# Patient Record
Sex: FEMALE | Race: WHITE | NOT HISPANIC OR LATINO | Employment: OTHER | URBAN - METROPOLITAN AREA
[De-identification: names, ages, dates, MRNs, and addresses within clinical notes are randomized per-mention and may not be internally consistent; named-entity substitution may affect disease eponyms.]

---

## 2017-03-29 ENCOUNTER — HISTORICAL (OUTPATIENT)
Dept: RADIOLOGY | Facility: HOSPITAL | Age: 72
End: 2017-03-29

## 2017-05-06 ENCOUNTER — HISTORICAL (OUTPATIENT)
Dept: LAB | Facility: HOSPITAL | Age: 72
End: 2017-05-06

## 2017-05-06 LAB
ALBUMIN SERPL-MCNC: 3.7 GM/DL (ref 3.4–5)
ALBUMIN/GLOB SERPL: 1.3 RATIO (ref 1.1–2)
ALP SERPL-CCNC: 105 UNIT/L (ref 46–116)
ALT SERPL-CCNC: 20 UNIT/L (ref 12–78)
AST SERPL-CCNC: 15 UNIT/L (ref 15–37)
BILIRUB SERPL-MCNC: 0.3 MG/DL (ref 0.2–1)
BILIRUBIN DIRECT+TOT PNL SERPL-MCNC: 0.09 MG/DL (ref 0–0.2)
BILIRUBIN DIRECT+TOT PNL SERPL-MCNC: 0.21 MG/DL (ref 0–0.8)
BUN SERPL-MCNC: 18 MG/DL (ref 7–18)
CALCIUM SERPL-MCNC: 9.3 MG/DL (ref 8.5–10.1)
CHLORIDE SERPL-SCNC: 106 MMOL/L (ref 98–107)
CO2 SERPL-SCNC: 28.5 MMOL/L (ref 21–32)
CREAT SERPL-MCNC: 0.94 MG/DL (ref 0.6–1.3)
GLOBULIN SER-MCNC: 2.9 GM/DL (ref 2.4–3.5)
GLUCOSE SERPL-MCNC: 135 MG/DL (ref 74–106)
POTASSIUM SERPL-SCNC: 4.2 MMOL/L (ref 3.5–5.1)
PROT SERPL-MCNC: 6.6 GM/DL (ref 6.4–8.2)
SODIUM SERPL-SCNC: 145 MMOL/L (ref 136–145)

## 2017-10-06 ENCOUNTER — HISTORICAL (OUTPATIENT)
Dept: RADIOLOGY | Facility: HOSPITAL | Age: 72
End: 2017-10-06

## 2017-10-06 LAB
ABS NEUT (OLG): 3.3 X10(3)/MCL (ref 2.1–9.2)
ALBUMIN SERPL-MCNC: 3.7 GM/DL (ref 3.4–5)
ALBUMIN/GLOB SERPL: 1.3 RATIO (ref 1.1–2)
ALP SERPL-CCNC: 90 UNIT/L (ref 46–116)
ALT SERPL-CCNC: 20 UNIT/L (ref 12–78)
AST SERPL-CCNC: 10 UNIT/L (ref 15–37)
BASOPHILS NFR BLD AUTO: 1 % (ref 0–2)
BILIRUB SERPL-MCNC: 0.4 MG/DL (ref 0.2–1)
BILIRUBIN DIRECT+TOT PNL SERPL-MCNC: 0.12 MG/DL (ref 0–0.2)
BILIRUBIN DIRECT+TOT PNL SERPL-MCNC: 0.28 MG/DL (ref 0–0.8)
BUN SERPL-MCNC: 14.6 MG/DL (ref 7–18)
CALCIUM SERPL-MCNC: 9.3 MG/DL (ref 8.5–10.1)
CHLORIDE SERPL-SCNC: 108 MMOL/L (ref 98–107)
CHOLEST SERPL-MCNC: 157 MG/DL (ref 0–200)
CHOLEST/HDLC SERPL: 2.5 {RATIO} (ref 0–4)
CO2 SERPL-SCNC: 35.1 MMOL/L (ref 21–32)
CREAT SERPL-MCNC: 0.72 MG/DL (ref 0.6–1.3)
EOSINOPHIL # BLD AUTO: 0.1 X10(3)/MCL
EOSINOPHIL NFR BLD AUTO: 2 %
ERYTHROCYTE [DISTWIDTH] IN BLOOD BY AUTOMATED COUNT: 13.6 % (ref 11.5–17)
GLOBULIN SER-MCNC: 2.9 GM/DL (ref 2.4–3.5)
GLUCOSE SERPL-MCNC: 107 MG/DL (ref 74–106)
HCT VFR BLD AUTO: 40.4 % (ref 37–47)
HDLC SERPL-MCNC: 62 MG/DL (ref 40–60)
HGB BLD-MCNC: 13.2 GM/DL (ref 12–16)
LDLC SERPL CALC-MCNC: 61 MG/DL (ref 0–129)
LYMPHOCYTES # BLD AUTO: 2.3 X10(3)/MCL
LYMPHOCYTES NFR BLD AUTO: 38 % (ref 13–40)
MCH RBC QN AUTO: 29.4 PG (ref 27–31)
MCHC RBC AUTO-ENTMCNC: 32.7 GM/DL (ref 33–36)
MCV RBC AUTO: 89.9 FL (ref 80–94)
MONOCYTES # BLD AUTO: 0.3 X10(3)/MCL
MONOCYTES NFR BLD AUTO: 4 % (ref 2–11)
NEUTROPHILS # BLD AUTO: 3.3 X10(3)/MCL (ref 2.1–9.2)
NEUTROPHILS NFR BLD AUTO: 55 % (ref 47–80)
PLATELET # BLD AUTO: 207 X10(3)/MCL (ref 130–400)
PMV BLD AUTO: 8.6 FL (ref 7.4–10.4)
POTASSIUM SERPL-SCNC: 3.7 MMOL/L (ref 3.5–5.1)
PROT SERPL-MCNC: 6.6 GM/DL (ref 6.4–8.2)
RBC # BLD AUTO: 4.49 X10(6)/MCL (ref 4.2–5.4)
SODIUM SERPL-SCNC: 147 MMOL/L (ref 136–145)
TRIGL SERPL-MCNC: 172 MG/DL
TSH SERPL-ACNC: 1.75 MIU/ML (ref 0.36–3.74)
VLDLC SERPL CALC-MCNC: 34 MG/DL
WBC # SPEC AUTO: 6 X10(3)/MCL (ref 4.5–11.5)

## 2017-10-18 ENCOUNTER — HISTORICAL (OUTPATIENT)
Dept: RADIOLOGY | Facility: HOSPITAL | Age: 72
End: 2017-10-18

## 2017-11-04 ENCOUNTER — HISTORICAL (OUTPATIENT)
Dept: LAB | Facility: HOSPITAL | Age: 72
End: 2017-11-04

## 2017-11-04 LAB
ERYTHROCYTE [DISTWIDTH] IN BLOOD BY AUTOMATED COUNT: 13.9 % (ref 11.5–17)
HCT VFR BLD AUTO: 39.1 % (ref 37–47)
HGB BLD-MCNC: 12.9 GM/DL (ref 12–16)
MCH RBC QN AUTO: 29.8 PG (ref 27–31)
MCHC RBC AUTO-ENTMCNC: 33 GM/DL (ref 33–36)
MCV RBC AUTO: 90.1 FL (ref 80–94)
PLATELET # BLD AUTO: 188 X10(3)/MCL (ref 130–400)
PMV BLD AUTO: 8.5 FL (ref 7.4–10.4)
RBC # BLD AUTO: 4.34 X10(6)/MCL (ref 4.2–5.4)
WBC # SPEC AUTO: 5.8 X10(3)/MCL (ref 4.5–11.5)

## 2017-12-06 ENCOUNTER — HISTORICAL (OUTPATIENT)
Dept: LAB | Facility: HOSPITAL | Age: 72
End: 2017-12-06

## 2017-12-06 LAB
ABS NEUT (OLG): 4 X10(3)/MCL (ref 2.1–9.2)
BASOPHILS # BLD AUTO: 0.1 X10(3)/MCL
BASOPHILS NFR BLD AUTO: 1 % (ref 0–2)
BUN SERPL-MCNC: 16.3 MG/DL (ref 7–18)
CALCIUM SERPL-MCNC: 9.2 MG/DL (ref 8.5–10.1)
CHLORIDE SERPL-SCNC: 105 MMOL/L (ref 98–107)
CO2 SERPL-SCNC: 34.6 MMOL/L (ref 21–32)
CREAT SERPL-MCNC: 0.8 MG/DL (ref 0.6–1.3)
EOSINOPHIL # BLD AUTO: 0.3 X10(3)/MCL
EOSINOPHIL NFR BLD AUTO: 3 %
ERYTHROCYTE [DISTWIDTH] IN BLOOD BY AUTOMATED COUNT: 13.5 % (ref 11.5–17)
GLUCOSE SERPL-MCNC: 79 MG/DL (ref 74–106)
HCT VFR BLD AUTO: 41 % (ref 37–47)
HGB BLD-MCNC: 13.4 GM/DL (ref 12–16)
LYMPHOCYTES # BLD AUTO: 3.2 X10(3)/MCL
LYMPHOCYTES NFR BLD AUTO: 40 % (ref 13–40)
MCH RBC QN AUTO: 29.6 PG (ref 27–31)
MCHC RBC AUTO-ENTMCNC: 32.6 GM/DL (ref 33–36)
MCV RBC AUTO: 90.8 FL (ref 80–94)
MONOCYTES # BLD AUTO: 0.5 X10(3)/MCL
MONOCYTES NFR BLD AUTO: 7 % (ref 2–11)
NEUTROPHILS # BLD AUTO: 4 X10(3)/MCL (ref 2.1–9.2)
NEUTROPHILS NFR BLD AUTO: 49 % (ref 47–80)
PLATELET # BLD AUTO: 227 X10(3)/MCL (ref 130–400)
PMV BLD AUTO: 8.4 FL (ref 7.4–10.4)
POTASSIUM SERPL-SCNC: 4 MMOL/L (ref 3.5–5.1)
RBC # BLD AUTO: 4.52 X10(6)/MCL (ref 4.2–5.4)
SODIUM SERPL-SCNC: 148 MMOL/L (ref 136–145)
WBC # SPEC AUTO: 8.1 X10(3)/MCL (ref 4.5–11.5)

## 2017-12-07 ENCOUNTER — HISTORICAL (OUTPATIENT)
Dept: RADIOLOGY | Facility: HOSPITAL | Age: 72
End: 2017-12-07

## 2017-12-08 ENCOUNTER — HISTORICAL (OUTPATIENT)
Dept: SURGERY | Facility: HOSPITAL | Age: 72
End: 2017-12-08

## 2018-04-04 ENCOUNTER — HISTORICAL (OUTPATIENT)
Dept: RADIOLOGY | Facility: HOSPITAL | Age: 73
End: 2018-04-04

## 2018-04-04 LAB
ALBUMIN SERPL-MCNC: 3.6 GM/DL (ref 3.4–5)
ALBUMIN/GLOB SERPL: 1.2 RATIO (ref 1.1–2)
ALP SERPL-CCNC: 89 UNIT/L (ref 46–116)
ALT SERPL-CCNC: 16 UNIT/L (ref 12–78)
AST SERPL-CCNC: 10 UNIT/L (ref 15–37)
BILIRUB SERPL-MCNC: 0.3 MG/DL (ref 0.2–1)
BILIRUBIN DIRECT+TOT PNL SERPL-MCNC: 0.09 MG/DL (ref 0–0.2)
BILIRUBIN DIRECT+TOT PNL SERPL-MCNC: 0.21 MG/DL (ref 0–0.8)
BUN SERPL-MCNC: 17.2 MG/DL (ref 7–18)
CALCIUM SERPL-MCNC: 9.5 MG/DL (ref 8.5–10.1)
CHLORIDE SERPL-SCNC: 108 MMOL/L (ref 98–107)
CHOLEST SERPL-MCNC: 174 MG/DL (ref 0–200)
CHOLEST/HDLC SERPL: 2.1 {RATIO} (ref 0–4)
CO2 SERPL-SCNC: 27.6 MMOL/L (ref 21–32)
CREAT SERPL-MCNC: 0.76 MG/DL (ref 0.6–1.3)
GLOBULIN SER-MCNC: 2.9 GM/DL (ref 2.4–3.5)
GLUCOSE SERPL-MCNC: 135 MG/DL (ref 74–106)
HDLC SERPL-MCNC: 82 MG/DL (ref 40–60)
LDLC SERPL CALC-MCNC: 70 MG/DL (ref 0–129)
POTASSIUM SERPL-SCNC: 3.8 MMOL/L (ref 3.5–5.1)
PROT SERPL-MCNC: 6.5 GM/DL (ref 6.4–8.2)
SODIUM SERPL-SCNC: 146 MMOL/L (ref 136–145)
TRIGL SERPL-MCNC: 109 MG/DL
TSH SERPL-ACNC: 0.45 MIU/ML (ref 0.36–3.74)
VLDLC SERPL CALC-MCNC: 22 MG/DL

## 2018-04-11 ENCOUNTER — HISTORICAL (OUTPATIENT)
Dept: RADIOLOGY | Facility: HOSPITAL | Age: 73
End: 2018-04-11

## 2018-09-24 ENCOUNTER — HISTORICAL (OUTPATIENT)
Dept: LAB | Facility: HOSPITAL | Age: 73
End: 2018-09-24

## 2018-09-24 LAB
ABS NEUT (OLG): 2.83 X10(3)/MCL (ref 2.1–9.2)
ALBUMIN SERPL-MCNC: 3.5 GM/DL (ref 3.4–5)
ALBUMIN/GLOB SERPL: 1.2 RATIO (ref 1.1–2)
ALP SERPL-CCNC: 97 UNIT/L (ref 46–116)
ALT SERPL-CCNC: 17 UNIT/L (ref 12–78)
AST SERPL-CCNC: 10 UNIT/L (ref 15–37)
BASOPHILS # BLD AUTO: 0 X10(3)/MCL (ref 0–0.2)
BASOPHILS NFR BLD AUTO: 1 %
BILIRUB SERPL-MCNC: 0.3 MG/DL (ref 0.2–1)
BILIRUBIN DIRECT+TOT PNL SERPL-MCNC: 0.13 MG/DL (ref 0–0.2)
BILIRUBIN DIRECT+TOT PNL SERPL-MCNC: 0.17 MG/DL (ref 0–0.8)
BUN SERPL-MCNC: 12.5 MG/DL (ref 7–18)
CALCIUM SERPL-MCNC: 8.5 MG/DL (ref 8.5–10.1)
CHLORIDE SERPL-SCNC: 108 MMOL/L (ref 98–107)
CHOLEST SERPL-MCNC: 139 MG/DL (ref 0–200)
CHOLEST/HDLC SERPL: 2 {RATIO} (ref 0–4)
CO2 SERPL-SCNC: 34.1 MMOL/L (ref 21–32)
CREAT SERPL-MCNC: 0.73 MG/DL (ref 0.6–1.3)
DEPRECATED CALCIDIOL+CALCIFEROL SERPL-MC: 30.3 NG/ML (ref 30–80)
EOSINOPHIL # BLD AUTO: 0.2 X10(3)/MCL (ref 0–0.9)
EOSINOPHIL NFR BLD AUTO: 3 %
ERYTHROCYTE [DISTWIDTH] IN BLOOD BY AUTOMATED COUNT: 13 % (ref 11.5–17)
EST. AVERAGE GLUCOSE BLD GHB EST-MCNC: 137 MG/DL
GLOBULIN SER-MCNC: 2.9 GM/DL (ref 2.4–3.5)
GLUCOSE SERPL-MCNC: 120 MG/DL (ref 74–106)
HBA1C MFR BLD: 6.4 % (ref 4.5–6.2)
HCT VFR BLD AUTO: 39.9 % (ref 37–47)
HDLC SERPL-MCNC: 70 MG/DL (ref 40–60)
HGB BLD-MCNC: 12.5 GM/DL (ref 12–16)
LDLC SERPL CALC-MCNC: 48 MG/DL (ref 0–129)
LYMPHOCYTES # BLD AUTO: 2.3 X10(3)/MCL (ref 0.6–4.6)
LYMPHOCYTES NFR BLD AUTO: 40 %
MCH RBC QN AUTO: 29.3 PG (ref 27–31)
MCHC RBC AUTO-ENTMCNC: 31.3 GM/DL (ref 33–36)
MCV RBC AUTO: 93.4 FL (ref 80–94)
MONOCYTES # BLD AUTO: 0.3 X10(3)/MCL (ref 0.1–1.3)
MONOCYTES NFR BLD AUTO: 5 %
NEUTROPHILS # BLD AUTO: 2.83 X10(3)/MCL (ref 1.4–7.9)
NEUTROPHILS NFR BLD AUTO: 51 %
PLATELET # BLD AUTO: 250 X10(3)/MCL (ref 130–400)
PMV BLD AUTO: 10.5 FL (ref 9.4–12.4)
POTASSIUM SERPL-SCNC: 3.1 MMOL/L (ref 3.5–5.1)
PROT SERPL-MCNC: 6.4 GM/DL (ref 6.4–8.2)
RBC # BLD AUTO: 4.27 X10(6)/MCL (ref 4.2–5.4)
SODIUM SERPL-SCNC: 150 MMOL/L (ref 136–145)
TRIGL SERPL-MCNC: 103 MG/DL
TSH SERPL-ACNC: 2.07 MIU/ML (ref 0.36–3.74)
VLDLC SERPL CALC-MCNC: 21 MG/DL
WBC # SPEC AUTO: 5.6 X10(3)/MCL (ref 4.5–11.5)

## 2018-11-01 ENCOUNTER — HISTORICAL (OUTPATIENT)
Dept: PHYSICAL THERAPY | Facility: HOSPITAL | Age: 73
End: 2018-11-01

## 2018-11-06 ENCOUNTER — HISTORICAL (OUTPATIENT)
Dept: PHYSICAL THERAPY | Facility: HOSPITAL | Age: 73
End: 2018-11-06

## 2018-11-14 ENCOUNTER — HISTORICAL (OUTPATIENT)
Dept: PHYSICAL THERAPY | Facility: HOSPITAL | Age: 73
End: 2018-11-14

## 2018-11-17 ENCOUNTER — HISTORICAL (OUTPATIENT)
Dept: LAB | Facility: HOSPITAL | Age: 73
End: 2018-11-17

## 2018-11-17 LAB
BNP BLD-MCNC: 201 PG/ML (ref 0–125)
BUN SERPL-MCNC: 11 MG/DL (ref 7–18)
CALCIUM SERPL-MCNC: 9.2 MG/DL (ref 8.5–10.1)
CHLORIDE SERPL-SCNC: 106 MMOL/L (ref 98–107)
CO2 SERPL-SCNC: 28.6 MMOL/L (ref 21–32)
CREAT SERPL-MCNC: 0.64 MG/DL (ref 0.6–1.3)
CREAT/UREA NIT SERPL: 17
ERYTHROCYTE [DISTWIDTH] IN BLOOD BY AUTOMATED COUNT: 13.1 % (ref 11.5–17)
GLUCOSE SERPL-MCNC: 105 MG/DL (ref 74–106)
HCT VFR BLD AUTO: 41.7 % (ref 37–47)
HGB BLD-MCNC: 13.2 GM/DL (ref 12–16)
MCH RBC QN AUTO: 29.1 PG (ref 27–31)
MCHC RBC AUTO-ENTMCNC: 31.7 GM/DL (ref 33–36)
MCV RBC AUTO: 92.1 FL (ref 80–94)
PLATELET # BLD AUTO: 271 X10(3)/MCL (ref 130–400)
PMV BLD AUTO: 10.9 FL (ref 9.4–12.4)
POTASSIUM SERPL-SCNC: 4.3 MMOL/L (ref 3.5–5.1)
RBC # BLD AUTO: 4.53 X10(6)/MCL (ref 4.2–5.4)
SODIUM SERPL-SCNC: 147 MMOL/L (ref 136–145)
WBC # SPEC AUTO: 5.9 X10(3)/MCL (ref 4.5–11.5)

## 2018-11-19 ENCOUNTER — HISTORICAL (OUTPATIENT)
Dept: PHYSICAL THERAPY | Facility: HOSPITAL | Age: 73
End: 2018-11-19

## 2018-11-21 ENCOUNTER — HISTORICAL (OUTPATIENT)
Dept: PHYSICAL THERAPY | Facility: HOSPITAL | Age: 73
End: 2018-11-21

## 2018-11-26 ENCOUNTER — HISTORICAL (OUTPATIENT)
Dept: PHYSICAL THERAPY | Facility: HOSPITAL | Age: 73
End: 2018-11-26

## 2018-11-28 ENCOUNTER — HISTORICAL (OUTPATIENT)
Dept: PHYSICAL THERAPY | Facility: HOSPITAL | Age: 73
End: 2018-11-28

## 2018-12-04 ENCOUNTER — HISTORICAL (OUTPATIENT)
Dept: PHYSICAL THERAPY | Facility: HOSPITAL | Age: 73
End: 2018-12-04

## 2018-12-06 ENCOUNTER — HISTORICAL (OUTPATIENT)
Dept: PHYSICAL THERAPY | Facility: HOSPITAL | Age: 73
End: 2018-12-06

## 2018-12-11 ENCOUNTER — HISTORICAL (OUTPATIENT)
Dept: PHYSICAL THERAPY | Facility: HOSPITAL | Age: 73
End: 2018-12-11

## 2018-12-13 ENCOUNTER — HISTORICAL (OUTPATIENT)
Dept: PHYSICAL THERAPY | Facility: HOSPITAL | Age: 73
End: 2018-12-13

## 2018-12-20 ENCOUNTER — HISTORICAL (OUTPATIENT)
Dept: RADIOLOGY | Facility: HOSPITAL | Age: 73
End: 2018-12-20

## 2019-01-07 ENCOUNTER — HISTORICAL (OUTPATIENT)
Dept: RADIOLOGY | Facility: HOSPITAL | Age: 74
End: 2019-01-07

## 2019-01-09 ENCOUNTER — HISTORICAL (OUTPATIENT)
Dept: RADIOLOGY | Facility: HOSPITAL | Age: 74
End: 2019-01-09

## 2019-01-23 ENCOUNTER — HISTORICAL (OUTPATIENT)
Dept: RADIOLOGY | Facility: HOSPITAL | Age: 74
End: 2019-01-23

## 2019-02-13 ENCOUNTER — HISTORICAL (OUTPATIENT)
Dept: RADIOLOGY | Facility: HOSPITAL | Age: 74
End: 2019-02-13

## 2019-02-21 ENCOUNTER — HISTORICAL (OUTPATIENT)
Dept: PREADMISSION TESTING | Facility: HOSPITAL | Age: 74
End: 2019-02-21

## 2019-02-21 LAB
ABS NEUT (OLG): 4.91 X10(3)/MCL (ref 2.1–9.2)
BASOPHILS # BLD AUTO: 0.1 X10(3)/MCL (ref 0–0.2)
BASOPHILS NFR BLD AUTO: 1 %
BUN SERPL-MCNC: 12 MG/DL (ref 7–18)
CALCIUM SERPL-MCNC: 8.6 MG/DL (ref 8.5–10.1)
CHLORIDE SERPL-SCNC: 108 MMOL/L (ref 98–107)
CO2 SERPL-SCNC: 32 MMOL/L (ref 21–32)
CREAT SERPL-MCNC: 0.6 MG/DL (ref 0.55–1.02)
CREAT/UREA NIT SERPL: 20
EOSINOPHIL # BLD AUTO: 0.2 X10(3)/MCL (ref 0–0.9)
EOSINOPHIL NFR BLD AUTO: 2 %
ERYTHROCYTE [DISTWIDTH] IN BLOOD BY AUTOMATED COUNT: 13.2 % (ref 11.5–17)
GLUCOSE SERPL-MCNC: 141 MG/DL (ref 74–106)
HCT VFR BLD AUTO: 44.1 % (ref 37–47)
HGB BLD-MCNC: 13.3 GM/DL (ref 12–16)
LYMPHOCYTES # BLD AUTO: 2.6 X10(3)/MCL (ref 0.6–4.6)
LYMPHOCYTES NFR BLD AUTO: 32 %
MCH RBC QN AUTO: 29 PG (ref 27–31)
MCHC RBC AUTO-ENTMCNC: 30.2 GM/DL (ref 33–36)
MCV RBC AUTO: 96.3 FL (ref 80–94)
MONOCYTES # BLD AUTO: 0.4 X10(3)/MCL (ref 0.1–1.3)
MONOCYTES NFR BLD AUTO: 4 %
NEUTROPHILS # BLD AUTO: 4.91 X10(3)/MCL (ref 2.1–9.2)
NEUTROPHILS NFR BLD AUTO: 60 %
PLATELET # BLD AUTO: 268 X10(3)/MCL (ref 130–400)
PMV BLD AUTO: 10.9 FL (ref 9.4–12.4)
POTASSIUM SERPL-SCNC: 4.1 MMOL/L (ref 3.5–5.1)
RBC # BLD AUTO: 4.58 X10(6)/MCL (ref 4.2–5.4)
SODIUM SERPL-SCNC: 146 MMOL/L (ref 136–145)
WBC # SPEC AUTO: 8.1 X10(3)/MCL (ref 4.5–11.5)

## 2019-02-22 ENCOUNTER — HISTORICAL (OUTPATIENT)
Dept: SURGERY | Facility: HOSPITAL | Age: 74
End: 2019-02-22

## 2019-03-25 ENCOUNTER — HISTORICAL (OUTPATIENT)
Dept: LAB | Facility: HOSPITAL | Age: 74
End: 2019-03-25

## 2019-03-25 LAB
ALBUMIN SERPL-MCNC: 3.5 GM/DL (ref 3.4–5)
ALBUMIN/GLOB SERPL: 1.2 RATIO (ref 1.1–2)
ALP SERPL-CCNC: 113 UNIT/L (ref 46–116)
ALT SERPL-CCNC: 29 UNIT/L (ref 12–78)
AST SERPL-CCNC: 27 UNIT/L (ref 15–37)
BILIRUB SERPL-MCNC: 0.4 MG/DL (ref 0.2–1)
BILIRUBIN DIRECT+TOT PNL SERPL-MCNC: 0.14 MG/DL (ref 0–0.2)
BILIRUBIN DIRECT+TOT PNL SERPL-MCNC: 0.26 MG/DL (ref 0–0.8)
BUN SERPL-MCNC: 13.9 MG/DL (ref 7–18)
CALCIUM SERPL-MCNC: 8.9 MG/DL (ref 8.5–10.1)
CHLORIDE SERPL-SCNC: 105 MMOL/L (ref 98–107)
CHOLEST SERPL-MCNC: 113 MG/DL (ref 0–200)
CHOLEST/HDLC SERPL: 2.1 {RATIO} (ref 0–4)
CO2 SERPL-SCNC: 30.1 MMOL/L (ref 21–32)
CREAT SERPL-MCNC: 0.71 MG/DL (ref 0.6–1.3)
GLOBULIN SER-MCNC: 2.9 GM/DL (ref 2.4–3.5)
GLUCOSE SERPL-MCNC: 112 MG/DL (ref 74–106)
HDLC SERPL-MCNC: 53 MG/DL (ref 40–60)
LDLC SERPL CALC-MCNC: 43 MG/DL (ref 0–129)
POTASSIUM SERPL-SCNC: 3.8 MMOL/L (ref 3.5–5.1)
PROT SERPL-MCNC: 6.4 GM/DL (ref 6.4–8.2)
SODIUM SERPL-SCNC: 144 MMOL/L (ref 136–145)
TRIGL SERPL-MCNC: 85 MG/DL
TSH SERPL-ACNC: 1.97 MIU/ML (ref 0.36–3.74)
VLDLC SERPL CALC-MCNC: 17 MG/DL

## 2019-04-29 ENCOUNTER — HISTORICAL (OUTPATIENT)
Dept: RADIOLOGY | Facility: HOSPITAL | Age: 74
End: 2019-04-29

## 2019-05-15 ENCOUNTER — HISTORICAL (OUTPATIENT)
Dept: INFUSION THERAPY | Facility: HOSPITAL | Age: 74
End: 2019-05-15

## 2019-06-05 ENCOUNTER — HISTORICAL (OUTPATIENT)
Dept: RADIOLOGY | Facility: HOSPITAL | Age: 74
End: 2019-06-05

## 2019-08-20 ENCOUNTER — HISTORICAL (OUTPATIENT)
Dept: ADMINISTRATIVE | Facility: HOSPITAL | Age: 74
End: 2019-08-20

## 2019-08-22 ENCOUNTER — HISTORICAL (OUTPATIENT)
Dept: LAB | Facility: HOSPITAL | Age: 74
End: 2019-08-22

## 2019-08-22 LAB
ALBUMIN SERPL-MCNC: 3.6 GM/DL (ref 3.4–5)
ALP SERPL-CCNC: 79 UNIT/L (ref 46–116)
ALT SERPL-CCNC: 15 UNIT/L (ref 12–78)
AST SERPL-CCNC: 12 UNIT/L (ref 15–37)
BILIRUB SERPL-MCNC: 0.4 MG/DL (ref 0.2–1)
BILIRUBIN DIRECT+TOT PNL SERPL-MCNC: 0.13 MG/DL (ref 0–0.2)
BILIRUBIN DIRECT+TOT PNL SERPL-MCNC: 0.27 MG/DL (ref 0–0.8)
CHOLEST SERPL-MCNC: 149 MG/DL (ref 0–200)
CHOLEST/HDLC SERPL: 2.4 {RATIO} (ref 0–4)
HDLC SERPL-MCNC: 63 MG/DL (ref 40–60)
LDLC SERPL CALC-MCNC: 59 MG/DL (ref 0–129)
PROT SERPL-MCNC: 6.6 GM/DL (ref 6.4–8.2)
TRIGL SERPL-MCNC: 135 MG/DL
VLDLC SERPL CALC-MCNC: 27 MG/DL

## 2019-09-12 ENCOUNTER — HISTORICAL (OUTPATIENT)
Dept: PHYSICAL THERAPY | Facility: HOSPITAL | Age: 74
End: 2019-09-12

## 2019-09-16 ENCOUNTER — HISTORICAL (OUTPATIENT)
Dept: PHYSICAL THERAPY | Facility: HOSPITAL | Age: 74
End: 2019-09-16

## 2019-09-18 ENCOUNTER — HISTORICAL (OUTPATIENT)
Dept: PHYSICAL THERAPY | Facility: HOSPITAL | Age: 74
End: 2019-09-18

## 2019-09-20 ENCOUNTER — HISTORICAL (OUTPATIENT)
Dept: PHYSICAL THERAPY | Facility: HOSPITAL | Age: 74
End: 2019-09-20

## 2019-09-23 ENCOUNTER — HISTORICAL (OUTPATIENT)
Dept: PHYSICAL THERAPY | Facility: HOSPITAL | Age: 74
End: 2019-09-23

## 2019-09-25 ENCOUNTER — HISTORICAL (OUTPATIENT)
Dept: PHYSICAL THERAPY | Facility: HOSPITAL | Age: 74
End: 2019-09-25

## 2019-09-25 ENCOUNTER — HISTORICAL (OUTPATIENT)
Dept: RADIOLOGY | Facility: HOSPITAL | Age: 74
End: 2019-09-25

## 2019-09-26 ENCOUNTER — HISTORICAL (OUTPATIENT)
Dept: LAB | Facility: HOSPITAL | Age: 74
End: 2019-09-26

## 2019-09-26 LAB
APPEARANCE, UA: CLEAR
BACTERIA SPEC CULT: ABNORMAL /HPF
BILIRUB UR QL STRIP: NEGATIVE
COLOR UR: ABNORMAL
GLUCOSE (UA): NEGATIVE
HGB UR QL STRIP: NEGATIVE
KETONES UR QL STRIP: ABNORMAL
LEUKOCYTE ESTERASE UR QL STRIP: ABNORMAL
NITRITE UR QL STRIP: NEGATIVE
PH UR STRIP: 5 [PH] (ref 5–9)
PROT UR QL STRIP: NEGATIVE
RBC #/AREA URNS HPF: 5 /HPF (ref 0–2)
SP GR UR STRIP: 1.03 (ref 1–1.03)
SQUAMOUS EPITHELIAL, UA: ABNORMAL
UROBILINOGEN UR STRIP-ACNC: 1
WBC #/AREA URNS HPF: 10 /HPF (ref 0–3)

## 2019-09-27 ENCOUNTER — HISTORICAL (OUTPATIENT)
Dept: LAB | Facility: HOSPITAL | Age: 74
End: 2019-09-27

## 2019-09-27 ENCOUNTER — HISTORICAL (OUTPATIENT)
Dept: PHYSICAL THERAPY | Facility: HOSPITAL | Age: 74
End: 2019-09-27

## 2019-09-27 LAB
ABS NEUT (OLG): 4.88 X10(3)/MCL (ref 2.1–9.2)
BASOPHILS # BLD AUTO: 0.1 X10(3)/MCL (ref 0–0.2)
BASOPHILS NFR BLD AUTO: 1 %
DEPRECATED CALCIDIOL+CALCIFEROL SERPL-MC: 21.42 NG/ML (ref 30–80)
EOSINOPHIL # BLD AUTO: 0.2 X10(3)/MCL (ref 0–0.9)
EOSINOPHIL NFR BLD AUTO: 2 %
ERYTHROCYTE [DISTWIDTH] IN BLOOD BY AUTOMATED COUNT: 14 % (ref 11.5–17)
EST. AVERAGE GLUCOSE BLD GHB EST-MCNC: 126 MG/DL
HBA1C MFR BLD: 6 % (ref 4.5–6.2)
HCT VFR BLD AUTO: 37.9 % (ref 37–47)
HGB BLD-MCNC: 11.6 GM/DL (ref 12–16)
IMM GRANULOCYTES # BLD AUTO: 0.01 % (ref 0–0.02)
IMM GRANULOCYTES NFR BLD AUTO: 0.1 % (ref 0–0.43)
LYMPHOCYTES # BLD AUTO: 2.9 X10(3)/MCL (ref 0.6–4.6)
LYMPHOCYTES NFR BLD AUTO: 34 %
MCH RBC QN AUTO: 29.1 PG (ref 27–31)
MCHC RBC AUTO-ENTMCNC: 30.6 GM/DL (ref 33–36)
MCV RBC AUTO: 95.2 FL (ref 80–94)
MONOCYTES # BLD AUTO: 0.5 X10(3)/MCL (ref 0.1–1.3)
MONOCYTES NFR BLD AUTO: 6 %
NEUTROPHILS # BLD AUTO: 4.88 X10(3)/MCL (ref 1.4–7.9)
NEUTROPHILS NFR BLD AUTO: 57 %
PLATELET # BLD AUTO: 382 X10(3)/MCL (ref 130–400)
PMV BLD AUTO: 9.5 FL (ref 9.4–12.4)
RBC # BLD AUTO: 3.98 X10(6)/MCL (ref 4.2–5.4)
TSH SERPL-ACNC: 2.57 MIU/ML (ref 0.36–3.74)
WBC # SPEC AUTO: 8.6 X10(3)/MCL (ref 4.5–11.5)

## 2019-09-30 ENCOUNTER — HISTORICAL (OUTPATIENT)
Dept: PHYSICAL THERAPY | Facility: HOSPITAL | Age: 74
End: 2019-09-30

## 2019-10-02 ENCOUNTER — HISTORICAL (OUTPATIENT)
Dept: PHYSICAL THERAPY | Facility: HOSPITAL | Age: 74
End: 2019-10-02

## 2019-10-04 ENCOUNTER — HISTORICAL (OUTPATIENT)
Dept: PHYSICAL THERAPY | Facility: HOSPITAL | Age: 74
End: 2019-10-04

## 2019-10-07 ENCOUNTER — HISTORICAL (OUTPATIENT)
Dept: PHYSICAL THERAPY | Facility: HOSPITAL | Age: 74
End: 2019-10-07

## 2019-10-09 ENCOUNTER — HISTORICAL (OUTPATIENT)
Dept: PHYSICAL THERAPY | Facility: HOSPITAL | Age: 74
End: 2019-10-09

## 2019-10-14 ENCOUNTER — HISTORICAL (OUTPATIENT)
Dept: PHYSICAL THERAPY | Facility: HOSPITAL | Age: 74
End: 2019-10-14

## 2019-10-16 ENCOUNTER — HISTORICAL (OUTPATIENT)
Dept: PHYSICAL THERAPY | Facility: HOSPITAL | Age: 74
End: 2019-10-16

## 2019-10-18 ENCOUNTER — HISTORICAL (OUTPATIENT)
Dept: PHYSICAL THERAPY | Facility: HOSPITAL | Age: 74
End: 2019-10-18

## 2019-10-21 ENCOUNTER — HISTORICAL (OUTPATIENT)
Dept: PHYSICAL THERAPY | Facility: HOSPITAL | Age: 74
End: 2019-10-21

## 2019-11-18 ENCOUNTER — HISTORICAL (OUTPATIENT)
Dept: INFUSION THERAPY | Facility: HOSPITAL | Age: 74
End: 2019-11-18

## 2020-03-12 ENCOUNTER — HISTORICAL (OUTPATIENT)
Dept: RADIOLOGY | Facility: HOSPITAL | Age: 75
End: 2020-03-12

## 2020-03-12 LAB
ABS NEUT (OLG): 3.73 X10(3)/MCL (ref 2.1–9.2)
ALBUMIN SERPL-MCNC: 3.7 GM/DL (ref 3.4–5)
ALBUMIN/GLOB SERPL: 1.4 RATIO (ref 1.1–2)
ALP SERPL-CCNC: 75 UNIT/L (ref 46–116)
ALT SERPL-CCNC: 16 UNIT/L (ref 12–78)
ANTINUCLEAR ANTIBODY SCREEN (OHS): NEGATIVE
AST SERPL-CCNC: 16 UNIT/L (ref 15–37)
BASOPHILS # BLD AUTO: 0.1 X10(3)/MCL (ref 0–0.2)
BASOPHILS NFR BLD AUTO: 1 %
BILIRUB SERPL-MCNC: 0.5 MG/DL (ref 0.2–1)
BILIRUBIN DIRECT+TOT PNL SERPL-MCNC: 0.17 MG/DL (ref 0–0.2)
BILIRUBIN DIRECT+TOT PNL SERPL-MCNC: 0.33 MG/DL (ref 0–0.8)
BUN SERPL-MCNC: 18.2 MG/DL (ref 7–18)
CALCIUM SERPL-MCNC: 8.9 MG/DL (ref 8.5–10.1)
CENTROMERE PROTEIN ANTIBODY (OHS): NEGATIVE
CHLORIDE SERPL-SCNC: 106 MMOL/L (ref 98–107)
CK SERPL-CCNC: 44 UNIT/L (ref 21–232)
CO2 SERPL-SCNC: 30.4 MMOL/L (ref 21–32)
CREAT SERPL-MCNC: 0.64 MG/DL (ref 0.6–1.3)
CRP SERPL-MCNC: 6.1 MG/DL (ref 0–0.9)
DSDNA ANTIBODY (OHS): NEGATIVE
EOSINOPHIL # BLD AUTO: 0.1 X10(3)/MCL (ref 0–0.9)
EOSINOPHIL NFR BLD AUTO: 2 %
ERYTHROCYTE [DISTWIDTH] IN BLOOD BY AUTOMATED COUNT: 14.2 % (ref 11.5–17)
ERYTHROCYTE [SEDIMENTATION RATE] IN BLOOD: 27 MM/HR (ref 0–20)
GLOBULIN SER-MCNC: 2.7 GM/DL (ref 2.4–3.5)
GLUCOSE SERPL-MCNC: 91 MG/DL (ref 74–106)
HCT VFR BLD AUTO: 39.7 % (ref 37–47)
HGB BLD-MCNC: 12.4 GM/DL (ref 12–16)
IMM GRANULOCYTES # BLD AUTO: 0.01 % (ref 0–0.02)
IMM GRANULOCYTES NFR BLD AUTO: 0.2 % (ref 0–0.43)
JO-1 ANTIBODY (OHS): NEGATIVE
LYMPHOCYTES # BLD AUTO: 2.3 X10(3)/MCL (ref 0.6–4.6)
LYMPHOCYTES NFR BLD AUTO: 35 %
MCH RBC QN AUTO: 28.2 PG (ref 27–31)
MCHC RBC AUTO-ENTMCNC: 31.2 GM/DL (ref 33–36)
MCV RBC AUTO: 90.4 FL (ref 80–94)
MONOCYTES # BLD AUTO: 0.4 X10(3)/MCL (ref 0.1–1.3)
MONOCYTES NFR BLD AUTO: 5 %
NEUTROPHILS # BLD AUTO: 3.73 X10(3)/MCL (ref 1.4–7.9)
NEUTROPHILS NFR BLD AUTO: 57 %
PLATELET # BLD AUTO: 246 X10(3)/MCL (ref 130–400)
PMV BLD AUTO: 11 FL (ref 9.4–12.4)
POTASSIUM SERPL-SCNC: 4.4 MMOL/L (ref 3.5–5.1)
PROT SERPL-MCNC: 6.4 GM/DL (ref 6.4–8.2)
RBC # BLD AUTO: 4.39 X10(6)/MCL (ref 4.2–5.4)
RHEUMATOID FACT SERPL-ACNC: <10 IU/ML (ref 0–15)
RNP70 ANTIBODY (OHS): NEGATIVE
SCLERODERMA (SCL-70S) ANTIBODY (OHS): NEGATIVE
SMITH DP IGG (OHS): NEGATIVE
SODIUM SERPL-SCNC: 145 MMOL/L (ref 136–145)
SSA(RO) ANTIBODY (OHS): NEGATIVE
SSB(LA) ANTIBODY (OHS): NEGATIVE
T PALLIDUM AB SER QL: NEGATIVE
U1RNP ANTIBODY (OHS): NEGATIVE
URATE SERPL-MCNC: 6.5 MG/DL (ref 2.6–7.2)
WBC # SPEC AUTO: 6.6 X10(3)/MCL (ref 4.5–11.5)

## 2020-09-21 ENCOUNTER — HISTORICAL (OUTPATIENT)
Dept: LAB | Facility: HOSPITAL | Age: 75
End: 2020-09-21

## 2020-09-21 LAB
ABS NEUT (OLG): 5.11 X10(3)/MCL (ref 2.1–9.2)
ALBUMIN SERPL-MCNC: 4.1 GM/DL (ref 3.4–5)
ALBUMIN/GLOB SERPL: 1.4 RATIO (ref 1.1–2)
ALP SERPL-CCNC: 80 UNIT/L (ref 46–116)
ALT SERPL-CCNC: 33 UNIT/L (ref 12–78)
APPEARANCE, UA: CLEAR
AST SERPL-CCNC: 70 UNIT/L (ref 15–37)
BACTERIA SPEC CULT: ABNORMAL
BASOPHILS # BLD AUTO: 0.1 X10(3)/MCL (ref 0–0.2)
BASOPHILS NFR BLD AUTO: 1 %
BILIRUB SERPL-MCNC: 0.5 MG/DL (ref 0.2–1)
BILIRUB UR QL STRIP: NEGATIVE
BILIRUBIN DIRECT+TOT PNL SERPL-MCNC: 0.16 MG/DL (ref 0–0.2)
BILIRUBIN DIRECT+TOT PNL SERPL-MCNC: 0.34 MG/DL (ref 0–0.8)
BUN SERPL-MCNC: 16.3 MG/DL (ref 7–18)
CALCIUM SERPL-MCNC: 9.5 MG/DL (ref 8.5–10.1)
CHLORIDE SERPL-SCNC: 102 MMOL/L (ref 98–107)
CHOLEST SERPL-MCNC: 156 MG/DL (ref 0–200)
CHOLEST/HDLC SERPL: 2.1 {RATIO} (ref 0–4)
CO2 SERPL-SCNC: 31.2 MMOL/L (ref 21–32)
COLOR UR: YELLOW
CREAT SERPL-MCNC: 1.06 MG/DL (ref 0.6–1.3)
DEPRECATED CALCIDIOL+CALCIFEROL SERPL-MC: 65.9 NG/ML (ref 6.6–49.9)
EOSINOPHIL # BLD AUTO: 0.2 X10(3)/MCL (ref 0–0.9)
EOSINOPHIL NFR BLD AUTO: 3 %
ERYTHROCYTE [DISTWIDTH] IN BLOOD BY AUTOMATED COUNT: 13.3 % (ref 11.5–17)
EST. AVERAGE GLUCOSE BLD GHB EST-MCNC: 126 MG/DL
GLOBULIN SER-MCNC: 3 GM/DL (ref 2.4–3.5)
GLUCOSE (UA): NEGATIVE
GLUCOSE SERPL-MCNC: 119 MG/DL (ref 74–106)
HBA1C MFR BLD: 6 % (ref 4.5–6.2)
HCT VFR BLD AUTO: 48.3 % (ref 37–47)
HDLC SERPL-MCNC: 75 MG/DL (ref 40–60)
HGB BLD-MCNC: 15.4 GM/DL (ref 12–16)
HGB UR QL STRIP: NEGATIVE
KETONES UR QL STRIP: NEGATIVE
LDLC SERPL CALC-MCNC: 45 MG/DL (ref 0–129)
LEUKOCYTE ESTERASE UR QL STRIP: ABNORMAL
LYMPHOCYTES # BLD AUTO: 2.9 X10(3)/MCL (ref 0.6–4.6)
LYMPHOCYTES NFR BLD AUTO: 33 %
MCH RBC QN AUTO: 30.4 PG (ref 27–31)
MCHC RBC AUTO-ENTMCNC: 31.9 GM/DL (ref 33–36)
MCV RBC AUTO: 95.3 FL (ref 80–94)
MONOCYTES # BLD AUTO: 0.4 X10(3)/MCL (ref 0.1–1.3)
MONOCYTES NFR BLD AUTO: 5 %
NEUTROPHILS # BLD AUTO: 5.11 X10(3)/MCL (ref 1.4–7.9)
NEUTROPHILS NFR BLD AUTO: 58 %
NITRITE UR QL STRIP: NEGATIVE
PH UR STRIP: 5.5 [PH] (ref 5–9)
PLATELET # BLD AUTO: 246 X10(3)/MCL (ref 130–400)
PMV BLD AUTO: 10.6 FL (ref 9.4–12.4)
POTASSIUM SERPL-SCNC: 4.2 MMOL/L (ref 3.5–5.1)
PROT SERPL-MCNC: 7.1 GM/DL (ref 6.4–8.2)
PROT UR QL STRIP: NEGATIVE
RBC # BLD AUTO: 5.07 X10(6)/MCL (ref 4.2–5.4)
RBC #/AREA URNS HPF: ABNORMAL /[HPF]
SODIUM SERPL-SCNC: 144 MMOL/L (ref 136–145)
SP GR UR STRIP: 1.02 (ref 1–1.03)
SQUAMOUS EPITHELIAL, UA: ABNORMAL
TRIGL SERPL-MCNC: 178 MG/DL
UROBILINOGEN UR STRIP-ACNC: 0.2
VLDLC SERPL CALC-MCNC: 36 MG/DL
WBC # SPEC AUTO: 8.8 X10(3)/MCL (ref 4.5–11.5)
WBC #/AREA URNS HPF: ABNORMAL /HPF

## 2021-03-27 ENCOUNTER — HISTORICAL (OUTPATIENT)
Dept: LAB | Facility: HOSPITAL | Age: 76
End: 2021-03-27

## 2021-03-27 LAB
ABS NEUT (OLG): 2.8 X10(3)/MCL (ref 2.1–9.2)
ALBUMIN SERPL-MCNC: 3.6 GM/DL (ref 3.4–4.8)
ALBUMIN/GLOB SERPL: 1.2 RATIO (ref 1.1–2)
ALP SERPL-CCNC: 76 UNIT/L (ref 40–150)
ALT SERPL-CCNC: 10 UNIT/L (ref 0–55)
APPEARANCE, UA: ABNORMAL
AST SERPL-CCNC: 22 UNIT/L (ref 5–34)
BACTERIA SPEC CULT: ABNORMAL
BASOPHILS # BLD AUTO: 0 X10(3)/MCL (ref 0–0.2)
BASOPHILS NFR BLD AUTO: 0 %
BILIRUB SERPL-MCNC: 0.5 MG/DL
BILIRUB UR QL STRIP: ABNORMAL
BILIRUBIN DIRECT+TOT PNL SERPL-MCNC: 0.2 MG/DL (ref 0–0.5)
BILIRUBIN DIRECT+TOT PNL SERPL-MCNC: 0.3 MG/DL (ref 0–0.8)
BUN SERPL-MCNC: 13.7 MG/DL (ref 9.8–20.1)
CALCIUM SERPL-MCNC: 8.6 MG/DL (ref 8.4–10.2)
CHLORIDE SERPL-SCNC: 106 MMOL/L (ref 98–107)
CHOLEST SERPL-MCNC: 144 MG/DL
CHOLEST/HDLC SERPL: 3 {RATIO} (ref 0–5)
CO2 SERPL-SCNC: 26 MMOL/L (ref 23–31)
COLOR UR: ABNORMAL
CREAT SERPL-MCNC: 0.72 MG/DL (ref 0.55–1.02)
DEPRECATED CALCIDIOL+CALCIFEROL SERPL-MC: 59.2 NG/ML (ref 30–80)
EOSINOPHIL # BLD AUTO: 0.2 X10(3)/MCL (ref 0–0.9)
EOSINOPHIL NFR BLD AUTO: 4 %
ERYTHROCYTE [DISTWIDTH] IN BLOOD BY AUTOMATED COUNT: 13.7 % (ref 11.5–17)
EST. AVERAGE GLUCOSE BLD GHB EST-MCNC: 116.9 MG/DL
GLOBULIN SER-MCNC: 3 GM/DL (ref 2.4–3.5)
GLUCOSE (UA): NEGATIVE
GLUCOSE SERPL-MCNC: 120 MG/DL (ref 82–115)
HBA1C MFR BLD: 5.7 %
HCT VFR BLD AUTO: 41 % (ref 37–47)
HDLC SERPL-MCNC: 51 MG/DL (ref 35–60)
HGB BLD-MCNC: 12.8 GM/DL (ref 12–16)
HGB UR QL STRIP: ABNORMAL
KETONES UR QL STRIP: NEGATIVE
LDLC SERPL CALC-MCNC: 61 MG/DL (ref 50–140)
LEUKOCYTE ESTERASE UR QL STRIP: ABNORMAL
LYMPHOCYTES # BLD AUTO: 2.5 X10(3)/MCL (ref 0.6–4.6)
LYMPHOCYTES NFR BLD AUTO: 43 %
MCH RBC QN AUTO: 29.1 PG (ref 27–31)
MCHC RBC AUTO-ENTMCNC: 31.2 GM/DL (ref 33–36)
MCV RBC AUTO: 93.2 FL (ref 80–94)
MONOCYTES # BLD AUTO: 0.3 X10(3)/MCL (ref 0.1–1.3)
MONOCYTES NFR BLD AUTO: 6 %
NEUTROPHILS # BLD AUTO: 2.8 X10(3)/MCL (ref 1.4–7.9)
NEUTROPHILS NFR BLD AUTO: 48 %
NITRITE UR QL STRIP: NEGATIVE
PH UR STRIP: 5.5 [PH] (ref 5–9)
PLATELET # BLD AUTO: 237 X10(3)/MCL (ref 130–400)
PMV BLD AUTO: 11.1 FL (ref 9.4–12.4)
POTASSIUM SERPL-SCNC: 4.1 MMOL/L (ref 3.5–5.1)
PROT SERPL-MCNC: 6.6 GM/DL (ref 5.8–7.6)
PROT UR QL STRIP: NEGATIVE
RBC # BLD AUTO: 4.4 X10(6)/MCL (ref 4.2–5.4)
RBC #/AREA URNS HPF: ABNORMAL /HPF
SODIUM SERPL-SCNC: 145 MMOL/L (ref 136–145)
SP GR UR STRIP: 1.02 (ref 1–1.03)
SQUAMOUS EPITHELIAL, UA: ABNORMAL
TRIGL SERPL-MCNC: 158 MG/DL (ref 37–140)
TSH SERPL-ACNC: 1.67 UIU/ML (ref 0.35–4.94)
UROBILINOGEN UR STRIP-ACNC: 0.2
VLDLC SERPL CALC-MCNC: 32 MG/DL
WBC # SPEC AUTO: 5.9 X10(3)/MCL (ref 4.5–11.5)
WBC #/AREA URNS HPF: ABNORMAL /HPF

## 2021-06-04 ENCOUNTER — HISTORICAL (OUTPATIENT)
Dept: ADMINISTRATIVE | Facility: HOSPITAL | Age: 76
End: 2021-06-04

## 2021-06-30 ENCOUNTER — HISTORICAL (OUTPATIENT)
Dept: RADIOLOGY | Facility: HOSPITAL | Age: 76
End: 2021-06-30

## 2021-07-16 ENCOUNTER — HISTORICAL (OUTPATIENT)
Dept: ADMINISTRATIVE | Facility: HOSPITAL | Age: 76
End: 2021-07-16

## 2021-09-28 ENCOUNTER — HISTORICAL (OUTPATIENT)
Dept: RADIOLOGY | Facility: HOSPITAL | Age: 76
End: 2021-09-28

## 2021-12-06 ENCOUNTER — HISTORICAL (OUTPATIENT)
Dept: SURGERY | Facility: HOSPITAL | Age: 76
End: 2021-12-06

## 2021-12-06 LAB
ABS NEUT (OLG): 4.08 X10(3)/MCL (ref 2.1–9.2)
BASOPHILS # BLD AUTO: 0.1 X10(3)/MCL (ref 0–0.2)
BASOPHILS NFR BLD AUTO: 1 %
BUN SERPL-MCNC: 13.9 MG/DL (ref 9.8–20.1)
CALCIUM SERPL-MCNC: 9.3 MG/DL (ref 8.7–10.5)
CHLORIDE SERPL-SCNC: 106 MMOL/L (ref 98–107)
CO2 SERPL-SCNC: 30 MMOL/L (ref 23–31)
CREAT SERPL-MCNC: 0.68 MG/DL (ref 0.55–1.02)
CREAT/UREA NIT SERPL: 20
EOSINOPHIL # BLD AUTO: 0.2 X10(3)/MCL (ref 0–0.9)
EOSINOPHIL NFR BLD AUTO: 3 %
ERYTHROCYTE [DISTWIDTH] IN BLOOD BY AUTOMATED COUNT: 13.4 % (ref 11.5–17)
GLUCOSE SERPL-MCNC: 115 MG/DL (ref 82–115)
HCT VFR BLD AUTO: 40.6 % (ref 37–47)
HGB BLD-MCNC: 12.7 GM/DL (ref 12–16)
LYMPHOCYTES # BLD AUTO: 2.1 X10(3)/MCL (ref 0.6–4.6)
LYMPHOCYTES NFR BLD AUTO: 31 %
MCH RBC QN AUTO: 28.8 PG (ref 27–31)
MCHC RBC AUTO-ENTMCNC: 31.3 GM/DL (ref 33–36)
MCV RBC AUTO: 92.1 FL (ref 80–94)
MONOCYTES # BLD AUTO: 0.3 X10(3)/MCL (ref 0.1–1.3)
MONOCYTES NFR BLD AUTO: 4 %
NEUTROPHILS # BLD AUTO: 4.08 X10(3)/MCL (ref 1.4–7.9)
NEUTROPHILS NFR BLD AUTO: 61 %
PLATELET # BLD AUTO: 218 X10(3)/MCL (ref 130–400)
PMV BLD AUTO: 10.6 FL (ref 9.4–12.4)
POTASSIUM SERPL-SCNC: 3.5 MMOL/L (ref 3.5–5.1)
RBC # BLD AUTO: 4.41 X10(6)/MCL (ref 4.2–5.4)
SODIUM SERPL-SCNC: 146 MMOL/L (ref 136–145)
WBC # SPEC AUTO: 6.7 X10(3)/MCL (ref 4.5–11.5)

## 2021-12-08 ENCOUNTER — HISTORICAL (OUTPATIENT)
Dept: SURGERY | Facility: HOSPITAL | Age: 76
End: 2021-12-08

## 2022-01-14 ENCOUNTER — HISTORICAL (OUTPATIENT)
Dept: RADIOLOGY | Facility: HOSPITAL | Age: 77
End: 2022-01-14

## 2022-03-24 ENCOUNTER — HISTORICAL (OUTPATIENT)
Dept: LAB | Facility: HOSPITAL | Age: 77
End: 2022-03-24

## 2022-03-24 LAB
ABS NEUT (OLG): 5.03 (ref 2.1–9.2)
ALBUMIN SERPL-MCNC: 3.5 G/DL (ref 3.4–4.8)
ALBUMIN SERPL-MCNC: 3.5 G/DL (ref 3.4–4.8)
ALBUMIN/GLOB SERPL: 1.1 {RATIO} (ref 1.1–2)
ALP SERPL-CCNC: 73 U/L (ref 40–150)
ALP SERPL-CCNC: 73 U/L (ref 40–150)
ALT SERPL-CCNC: 13 U/L (ref 0–55)
ALT SERPL-CCNC: 13 U/L (ref 0–55)
APPEARANCE, UA: CLEAR
AST SERPL-CCNC: 14 U/L (ref 5–34)
AST SERPL-CCNC: 15 U/L (ref 5–34)
BACTERIA SPEC CULT: NORMAL
BASOPHILS # BLD AUTO: 0.1 10*3/UL (ref 0–0.2)
BASOPHILS NFR BLD AUTO: 1 %
BILIRUB SERPL-MCNC: 0.5 MG/DL
BILIRUB SERPL-MCNC: 0.5 MG/DL
BILIRUB UR QL STRIP: NEGATIVE
BILIRUBIN DIRECT+TOT PNL SERPL-MCNC: 0.2 (ref 0–0.8)
BILIRUBIN DIRECT+TOT PNL SERPL-MCNC: 0.2 (ref 0–0.8)
BILIRUBIN DIRECT+TOT PNL SERPL-MCNC: 0.3 (ref 0–0.5)
BILIRUBIN DIRECT+TOT PNL SERPL-MCNC: 0.3 (ref 0–0.5)
BUN SERPL-MCNC: 11.8 MG/DL (ref 9.8–20.1)
CALCIUM SERPL-MCNC: 9.4 MG/DL (ref 8.7–10.5)
CHLORIDE SERPL-SCNC: 105 MMOL/L (ref 98–107)
CHOLEST SERPL-MCNC: 149 MG/DL
CHOLEST/HDLC SERPL: 2 {RATIO} (ref 0–5)
CO2 SERPL-SCNC: 29 MMOL/L (ref 23–31)
COLOR UR: YELLOW
CREAT SERPL-MCNC: 0.67 MG/DL (ref 0.55–1.02)
DEPRECATED CALCIDIOL+CALCIFEROL SERPL-MC: 43.8 NG/ML (ref 30–80)
EOSINOPHIL # BLD AUTO: 0.2 10*3/UL (ref 0–0.9)
EOSINOPHIL NFR BLD AUTO: 2 %
ERYTHROCYTE [DISTWIDTH] IN BLOOD BY AUTOMATED COUNT: 14 % (ref 11.5–17)
EST. AVERAGE GLUCOSE BLD GHB EST-MCNC: 128.4 MG/DL
GLOBULIN SER-MCNC: 3.2 G/DL (ref 2.4–3.5)
GLUCOSE (UA): NEGATIVE
GLUCOSE SERPL-MCNC: 120 MG/DL (ref 82–115)
HBA1C MFR BLD: 6.1 %
HCT VFR BLD AUTO: 43.8 % (ref 37–47)
HDLC SERPL-MCNC: 66 MG/DL (ref 35–60)
HEMOLYSIS INTERF INDEX SERPL-ACNC: 6
HEMOLYSIS INTERF INDEX SERPL-ACNC: 8
HGB BLD-MCNC: 13.5 G/DL (ref 12–16)
HGB UR QL STRIP: NORMAL
ICTERIC INTERF INDEX SERPL-ACNC: 0
ICTERIC INTERF INDEX SERPL-ACNC: 0
IMM GRANULOCYTES # BLD AUTO: 0.01 10*3/UL (ref 0–0.02)
IMM GRANULOCYTES NFR BLD AUTO: 0.1 % (ref 0–0.43)
KETONES UR QL STRIP: NEGATIVE
LDLC SERPL CALC-MCNC: 63 MG/DL (ref 50–140)
LEUKOCYTE ESTERASE UR QL STRIP: NEGATIVE
LIPEMIC INTERF INDEX SERPL-ACNC: 1
LIPEMIC INTERF INDEX SERPL-ACNC: 3
LYMPHOCYTES # BLD AUTO: 2.2 10*3/UL (ref 0.6–4.6)
LYMPHOCYTES NFR BLD AUTO: 28 %
MANUAL DIFF? (OHS): NO
MCH RBC QN AUTO: 29 PG (ref 27–31)
MCHC RBC AUTO-ENTMCNC: 30.8 G/DL (ref 33–36)
MCV RBC AUTO: 94.2 FL (ref 80–94)
MONOCYTES # BLD AUTO: 0.3 10*3/UL (ref 0.1–1.3)
MONOCYTES NFR BLD AUTO: 4 %
NEUTROPHILS # BLD AUTO: 5.03 10*3/UL (ref 1.4–7.9)
NEUTROPHILS NFR BLD AUTO: 64 %
NITRITE UR QL STRIP: NEGATIVE
PH UR STRIP: 6.5 [PH] (ref 5–9)
PLATELET # BLD AUTO: 248 10*3/UL (ref 130–400)
PMV BLD AUTO: 10.2 FL (ref 9.4–12.4)
POTASSIUM SERPL-SCNC: 3.8 MMOL/L (ref 3.5–5.1)
PROT SERPL-MCNC: 6.6 G/DL (ref 5.8–7.6)
PROT SERPL-MCNC: 6.7 G/DL (ref 5.8–7.6)
PROT UR QL STRIP: NEGATIVE
RBC # BLD AUTO: 4.65 10*6/UL (ref 4.2–5.4)
RBC #/AREA URNS HPF: NORMAL /[HPF] (ref 0–2)
SODIUM SERPL-SCNC: 143 MMOL/L (ref 136–145)
SP GR UR STRIP: 1.02 (ref 1–1.03)
SQUAMOUS EPITHELIAL, UA: NORMAL
TRIGL SERPL-MCNC: 100 MG/DL (ref 37–140)
TSH SERPL-ACNC: 2.55 M[IU]/L (ref 0.35–4.94)
UROBILINOGEN UR STRIP-ACNC: 0.2
VIT B12 SERPL-MCNC: 459 PG/ML (ref 213–816)
VLDLC SERPL CALC-MCNC: 20 MG/DL
WBC # SPEC AUTO: 7.9 10*3/UL (ref 4.5–11.5)
WBC #/AREA URNS HPF: NORMAL /[HPF] (ref 0–2)

## 2022-04-10 ENCOUNTER — HISTORICAL (OUTPATIENT)
Dept: ADMINISTRATIVE | Facility: HOSPITAL | Age: 77
End: 2022-04-10
Payer: MEDICARE

## 2022-04-29 VITALS
DIASTOLIC BLOOD PRESSURE: 74 MMHG | OXYGEN SATURATION: 96 % | SYSTOLIC BLOOD PRESSURE: 126 MMHG | BODY MASS INDEX: 47.34 KG/M2 | HEIGHT: 62 IN | WEIGHT: 257.25 LBS

## 2022-04-30 NOTE — OP NOTE
DATE OF SURGERY:    02/22/2019    SURGEON:  Samy Mcdowell MD    PREOPERATIVE DIAGNOSIS:  De Quervain's tenosynovitis right wrist 1st extensor compartment.    POSTOP DIAGNOSIS:  De Quervain's tenosynovitis right wrist 1st extensor compartment.    PROCEDURE:  1st extensor compartment release right wrist.    ANESTHESIA:  LMA.    IV FLUIDS:  As per Anesthesia.    ESTIMATED BLOOD LOSS:  Less than 2 cc.    COUNTS:  Correct.    COMPLICATIONS:  None stable to PACU procedure.    INDICATIONS:  Ms. Lala is a 73-year-old female with continued pain and loss of motion, right wrist.  She has failed multiple conservative treatments.  The patient was noted to have the above findings.  The risks, benefits and alternatives were discussed with the patient and the patient's family in detail.  The risks included, but were not limited to, pain, bleeding, infection, damage to blood vessels or nerves, heart attack, stroke, death, need for operation, continued pain, continued loss of motion.  Need for additional surgery.  Scar tissue.  The patient understood these risks and wanted to surgical intervention.  Informed consent was obtained.    PROCEDURE IN DETAIL:  The patient was found in preoperative holding by Anesthesia and found fit for surgery.  She is taking operating room and placed on the operating table in supine position all bony prominences were well padded.  Timeout was called to identify correct patient, correct procedure and correct site, and all were in agreement.  The patient underwent LMA anesthesia without complications.  She was then prepped and draped in normal sterile fashion, then the right upper extremity exposed for surgery.  A well padded tourniquet placed on the right upper arm.  Approximate tourniquet time was 8 minutes at 250.  She received her preoperative antibiotics, care was taken as she had recently had an elbow fracture.  After exsanguination of right upper extremity, tourniquet inflated, attention  was turned to the radial aspect of the right wrist, where a 1.5 cm incision made over the radial aspect of the right wrist in line with the first extensor compartment.  Soft tissue dissection was carried down, careful not to damage the neurovascular structures.  Next, 1st extensor compartment was identified, it was released.  Both proximally and distally releasing the compartment, there was fluid in the sheath.  She also had extensor retinaculum release as well.  Superiorly her wrist was also stressed and found to be stable without any signs of subluxation.  After complete release of all binding structures off the APL and EPB tendons.  Tourniquet was released, hemostasis achieved, copious irrigation was used to wash the wound.  Incision was then closed with 3-0 nylon suture in standard interrupted fashion.  Xeroform, 4 x 4's, soft tissue dressing, ACE wrap and a sling were placed on the right upper extremity.  She was awoken from local anesthesia and brought to PACU in stable condition.        ______________________________  Samy Mcdowell MD    AK/  DD:  02/22/2019  Time:  09:38AM  DT:  02/22/2019  Time:  09:54AM  Job #:  [id number]

## 2022-04-30 NOTE — OP NOTE
Patient:   Vesta Lala            MRN: [id number]            FIN: [id number]               Age:   75 years     Sex:  Female     :  1945   Associated Diagnoses:   None   Author:   Jerry Malloy MD      Preoperative Diagnosis:  Cataract Right eye    Postoperative Diagnosis:  Cataract Right eye    Procedure:  Phacoemulsification with intraocular lens implantation Right eye    Surgeon:  Jerry Malloy MD    Assistant:  JILL White    Anesthesia:  MAC    Complications:  None    The patient was brought to the [facility] [place] [facility].  A time out was performed.  The capsulotomy, lens fragmentation and limbal relaxing incisions were completed.  Then the patient was brought into the operating suite, where the patient was correctly identified as was the operative eye via timeout.  The patient was prepped and draped in a sterile ophthalmic fashion.  A lid speculum was placed in the operative eye and the microscope was brought into place.  A 1.0 mm paracentesis was then made at (12) o'clock.  The anterior chamber was filled with Shugarcaine and Endocoat.  A (temporal) clear corneal incision was made with a 2.4 mm keratome blade.  A 6 mm corneal marking ring was used to neha the cornea centered over the visual axis.  A 5.00 mm continuous curvilinear capsulorhexis was fashioned using a cystotome and microcapsular forceps.  Hydrodissection and hydrodelineation was performed with upreserved 1% Xylocaine.  The nucleus was then phacoemulsified with the Abbott phacoemulsification hand-piece with a total of (7) EFX.  The cortex was then removed with the I/A hand-piece.  The lens model (ZCB00) with a power of (29.0) was placed in the capsular bag.  The Helon was then removed from the eye with the I/A hand piece.  The anterior chamber was inflated and the wounds were hydrated with BSS.  The wounds were checked with Weck-Ashlee sponges and found to be watertight.  The lid speculum was removed and topical antibiotics were  placed on the operative eye.  The patient was brought to PACU in good condition.

## 2022-04-30 NOTE — OP NOTE
Patient:   Vesta Lala            MRN: [id number]            FIN: [id number]               Age:   76 years     Sex:  Female     :  1945   Associated Diagnoses:   None   Author:   Jerry Malloy MD      Preoperative Diagnosis: Cataract Left eye    Postoperative Diagnosis: Cataract Left eye    Procedure: Phacoemulsification with intaocular lens implantations Left eye    Surgeon: Jerry Malloy MD    Assistant: Pascale Silveira, [facility]    Anestheisa: MAC    Complications: None    The patient was brought into the operating suite, where the patient was correctly identified as was the operative eye via timeout.  The patient was prepped and draped in a sterile ophthalmic fashion.  A lid speculum was placed in the operative eye and the microscope was brought into place.  A 1.0mm paracentesis was then made at (6) o'clock.  The anterior chamber was filled with Endocoat.  A (temporal) clear corneal incision was made with a 2.4 mm keratome.  A 6 mm corneal marking ring was used to neha the cornea centered over the visual axis.  A 5.00 mm continuous curvilinear capsulorhexis was fashioned using a cystotome and microcapsular forceps.  Hydrodissection and hydrodelineation was performed with upreserved 1% Xylocaine.  The nucleus was then phacoemulsified with the Abbott phacoemulsification hand-piece with a total of (1) EFX.  The cortex was then removed with the I/A hand-piece. The lens model (ZCB00) with a power of (25.0) was placed in the capsular bag.  The Helon was then removed from the eye with the I/A hand piece.  The anterior chamber was inflated and the wounds were hydrated with BSS.  The wounds were checked with Weck-Ashlee sponges and found to be watertight.  The lid speculum was removed and topical antibiotics were placed on the operative eye.  The patient was brought to PACU in good condition.

## 2022-04-30 NOTE — OP NOTE
DATE OF SURGERY:    12/08/2017    SURGEON:  Samy Mcdowell MD    PREOPERATIVE DIAGNOSIS:  Trigger finger right middle finger.    POSTOPERATIVE DIAGNOSIS:  Trigger finger right middle finger.    PROCEDURE:  Trigger finger release right middle finger.    ASSISTANT:  Ian Zimmer, Certified First Assistant    ANESTHESIA:  LMA.    IV FLUIDS:  Per anesthesia.    ESTIMATED BLOOD LOSS:  Less than 5 ml.    COUNTS:  Correct.    COMPLICATIONS:  None; stable to PACU.    HISTORY:  This is a 72-year-old female with continued painful triggering of her right middle finger.  She has failed multiple conservative treatments.  Risks, benefits and alternatives were discussed with the patient in detail.  The risks include but are not limited to pain, bleeding, infection, damage to blood vessels or nerves, heart attack, stroke, death, need for operation, continued pain, continued loss of motion, re-triggering and scar tissue.  The patient understood these risks and wanted to proceed with surgical intervention.  Informed consent was obtained.    PROCEDURE IN DETAIL:  The patient was found in preoperative holding by anesthesia and was prepped and draped for surgery.  She was taken to the Operating Room and placed on the operating table in supine position.  All bony prominences were well padded.  Timeout was called to identify correct patient, correct procedure and correct site and all were in agreement.  The patient underwent LMA anesthesia without complications.  She was then prepped and draped in normal sterile fashion leaving the right upper extremity exposed for surgery.  A well padded tourniquet was placed on the right upper arm.  Approximate tourniquet time was 10 minutes at 250.  She received preoperative antibiotics.  After exsanguination right upper extremity, tourniquet was inflated.  A 1 cm incision was made over the volar aspect of the MCP joint of the right middle finger.  Soft tissue dissection was carried down to the  A-1 pulley, where it was incised releasing the flexor tendon.  All binding structures were taken off of the flexor tendon at this time.  After this was done, the tendon was brought outside of the skin.  There was no triggering appreciated.  She had full flexion and extension.  After this was done, tourniquet was released and hemostasis was achieved.  Copious irrigation was used to wash the incision.  The incision was closed with 3-0 nylon suture in standard interrupted fashion.  Xeroform, 4 X 4s and soft tissue dressing was placed on the right upper extremity.  She was awakened by anesthesia and brought to PACU in stable condition.        ______________________________  MD JOHN Gibbs/VERNON  DD:  12/18/2017  Time:  07:31AM  DT:  12/18/2017  Time:  05:29PM  Job #:  [id number]

## 2022-09-24 PROBLEM — Z97.3 WEARS EYEGLASSES: Status: ACTIVE | Noted: 2022-09-24

## 2022-09-24 PROBLEM — M15.9 GENERALIZED OSTEOARTHRITIS: Status: ACTIVE | Noted: 2022-09-24

## 2022-09-24 PROBLEM — E03.9 HYPOTHYROIDISM: Status: ACTIVE | Noted: 2022-09-24

## 2022-09-24 PROBLEM — E66.9 OBESITY: Status: ACTIVE | Noted: 2022-09-24

## 2022-09-24 PROBLEM — Z00.00 MEDICARE ANNUAL WELLNESS VISIT, SUBSEQUENT: Status: ACTIVE | Noted: 2022-09-24

## 2022-09-24 PROBLEM — I48.0 PAROXYSMAL ATRIAL FIBRILLATION: Status: ACTIVE | Noted: 2022-09-24

## 2022-09-24 PROBLEM — G89.29 CHRONIC BACK PAIN: Status: ACTIVE | Noted: 2022-09-24

## 2022-09-24 PROBLEM — I10 HYPERTENSION: Status: ACTIVE | Noted: 2022-09-24

## 2022-09-24 PROBLEM — E78.5 HYPERLIPIDEMIA: Status: ACTIVE | Noted: 2022-09-24

## 2022-09-24 PROBLEM — E11.9 DIABETES MELLITUS: Status: ACTIVE | Noted: 2022-09-24

## 2022-09-24 PROBLEM — H26.9 CATARACT: Status: ACTIVE | Noted: 2022-09-24

## 2022-09-24 PROBLEM — K21.9 GASTROESOPHAGEAL REFLUX DISEASE: Status: ACTIVE | Noted: 2022-09-24

## 2022-09-24 PROBLEM — R00.2 PALPITATIONS: Status: ACTIVE | Noted: 2022-09-24

## 2022-09-24 PROBLEM — M19.90 ARTHRITIS: Status: ACTIVE | Noted: 2022-09-24

## 2022-09-24 PROBLEM — M54.9 CHRONIC BACK PAIN: Status: ACTIVE | Noted: 2022-09-24

## 2022-09-24 PROBLEM — G47.30 SLEEP APNEA: Status: ACTIVE | Noted: 2022-09-24

## 2022-09-24 PROBLEM — I25.10 ARTERIOSCLEROSIS OF CORONARY ARTERY: Status: ACTIVE | Noted: 2022-09-24

## 2022-09-24 PROBLEM — N20.0 CALCULUS OF KIDNEY: Status: ACTIVE | Noted: 2022-09-24

## 2022-09-24 PROBLEM — E04.2 MULTINODULAR GOITER: Status: ACTIVE | Noted: 2022-09-24

## 2022-09-26 PROBLEM — M47.816 LUMBAR SPONDYLOSIS: Status: ACTIVE | Noted: 2022-09-26

## 2022-10-03 ENCOUNTER — HOSPITAL ENCOUNTER (OUTPATIENT)
Dept: RADIOLOGY | Facility: HOSPITAL | Age: 77
Discharge: HOME | End: 2022-10-03
Attending: FAMILY MEDICINE
Payer: MEDICARE

## 2022-10-03 DIAGNOSIS — Z12.31 SCREENING MAMMOGRAM FOR BREAST CANCER: ICD-10-CM

## 2022-10-03 PROCEDURE — 77067 MAMMO DIGITAL SCREENING BILAT WITH TOMO: ICD-10-PCS | Mod: 26,,, | Performed by: RADIOLOGY

## 2022-10-03 PROCEDURE — 77063 BREAST TOMOSYNTHESIS BI: CPT | Mod: 26,,, | Performed by: RADIOLOGY

## 2022-10-03 PROCEDURE — 77067 SCR MAMMO BI INCL CAD: CPT | Mod: 26,,, | Performed by: RADIOLOGY

## 2022-10-03 PROCEDURE — 77063 MAMMO DIGITAL SCREENING BILAT WITH TOMO: ICD-10-PCS | Mod: 26,,, | Performed by: RADIOLOGY

## 2022-10-03 PROCEDURE — 77067 SCR MAMMO BI INCL CAD: CPT | Mod: TC

## 2022-12-26 PROBLEM — Z00.00 MEDICARE ANNUAL WELLNESS VISIT, SUBSEQUENT: Status: RESOLVED | Noted: 2022-09-24 | Resolved: 2022-12-26

## 2023-04-10 ENCOUNTER — LAB VISIT (OUTPATIENT)
Dept: LAB | Facility: HOSPITAL | Age: 78
End: 2023-04-10
Attending: FAMILY MEDICINE
Payer: MEDICARE

## 2023-04-10 DIAGNOSIS — E11.65 TYPE 2 DIABETES MELLITUS WITH HYPERGLYCEMIA, WITHOUT LONG-TERM CURRENT USE OF INSULIN: ICD-10-CM

## 2023-04-10 DIAGNOSIS — E53.8 VITAMIN B12 DEFICIENCY: ICD-10-CM

## 2023-04-10 DIAGNOSIS — E78.49 OTHER HYPERLIPIDEMIA: ICD-10-CM

## 2023-04-10 DIAGNOSIS — N32.81 OAB (OVERACTIVE BLADDER): ICD-10-CM

## 2023-04-10 DIAGNOSIS — E03.8 OTHER SPECIFIED HYPOTHYROIDISM: ICD-10-CM

## 2023-04-10 DIAGNOSIS — E55.9 VITAMIN D DEFICIENCY: ICD-10-CM

## 2023-04-10 DIAGNOSIS — I10 PRIMARY HYPERTENSION: ICD-10-CM

## 2023-04-10 LAB
ALBUMIN SERPL-MCNC: 3.2 G/DL (ref 3.4–4.8)
ALBUMIN/GLOB SERPL: 1 RATIO (ref 1.1–2)
ALP SERPL-CCNC: 94 UNIT/L (ref 40–150)
ALT SERPL-CCNC: 14 UNIT/L (ref 0–55)
APPEARANCE UR: CLEAR
AST SERPL-CCNC: 15 UNIT/L (ref 5–34)
BASOPHILS # BLD AUTO: 0.04 X10(3)/MCL (ref 0–0.2)
BASOPHILS NFR BLD AUTO: 0.6 %
BILIRUB UR QL STRIP.AUTO: NEGATIVE MG/DL
BILIRUBIN DIRECT+TOT PNL SERPL-MCNC: 0.3 MG/DL
BUN SERPL-MCNC: 16.1 MG/DL (ref 9.8–20.1)
CALCIUM SERPL-MCNC: 9.2 MG/DL (ref 8.4–10.2)
CHLORIDE SERPL-SCNC: 106 MMOL/L (ref 98–107)
CHOLEST SERPL-MCNC: 223 MG/DL
CHOLEST/HDLC SERPL: 5 {RATIO} (ref 0–5)
CO2 SERPL-SCNC: 32 MMOL/L (ref 23–31)
COLOR UR AUTO: YELLOW
CREAT SERPL-MCNC: 0.7 MG/DL (ref 0.55–1.02)
DEPRECATED CALCIDIOL+CALCIFEROL SERPL-MC: 41.3 NG/ML (ref 30–80)
EOSINOPHIL # BLD AUTO: 0.27 X10(3)/MCL (ref 0–0.9)
EOSINOPHIL NFR BLD AUTO: 3.8 %
ERYTHROCYTE [DISTWIDTH] IN BLOOD BY AUTOMATED COUNT: 13.4 % (ref 11.5–17)
EST. AVERAGE GLUCOSE BLD GHB EST-MCNC: 128.4 MG/DL
GFR SERPLBLD CREATININE-BSD FMLA CKD-EPI: >60 MLS/MIN/1.73/M2
GLOBULIN SER-MCNC: 3.1 GM/DL (ref 2.4–3.5)
GLUCOSE SERPL-MCNC: 133 MG/DL (ref 82–115)
GLUCOSE UR QL STRIP.AUTO: NEGATIVE MG/DL
HBA1C MFR BLD: 6.1 %
HCT VFR BLD AUTO: 41.2 % (ref 37–47)
HDLC SERPL-MCNC: 48 MG/DL (ref 35–60)
HGB BLD-MCNC: 12.4 G/DL (ref 12–16)
IMM GRANULOCYTES # BLD AUTO: 0.01 X10(3)/MCL (ref 0–0.04)
IMM GRANULOCYTES NFR BLD AUTO: 0.1 %
KETONES UR QL STRIP.AUTO: NEGATIVE MG/DL
LDLC SERPL CALC-MCNC: 120 MG/DL (ref 50–140)
LEUKOCYTE ESTERASE UR QL STRIP.AUTO: ABNORMAL UNIT/L
LYMPHOCYTES # BLD AUTO: 2.37 X10(3)/MCL (ref 0.6–4.6)
LYMPHOCYTES NFR BLD AUTO: 33.3 %
MCH RBC QN AUTO: 28.4 PG (ref 27–31)
MCHC RBC AUTO-ENTMCNC: 30.1 G/DL (ref 33–36)
MCV RBC AUTO: 94.5 FL (ref 80–94)
MONOCYTES # BLD AUTO: 0.37 X10(3)/MCL (ref 0.1–1.3)
MONOCYTES NFR BLD AUTO: 5.2 %
NEUTROPHILS # BLD AUTO: 4.05 X10(3)/MCL (ref 2.1–9.2)
NEUTROPHILS NFR BLD AUTO: 57 %
NITRITE UR QL STRIP.AUTO: NEGATIVE
PH UR STRIP.AUTO: 7 [PH]
PLATELET # BLD AUTO: 247 X10(3)/MCL (ref 130–400)
PMV BLD AUTO: 10.4 FL (ref 7.4–10.4)
POTASSIUM SERPL-SCNC: 4 MMOL/L (ref 3.5–5.1)
PROT SERPL-MCNC: 6.3 GM/DL (ref 5.8–7.6)
PROT UR QL STRIP.AUTO: NEGATIVE MG/DL
RBC # BLD AUTO: 4.36 X10(6)/MCL (ref 4.2–5.4)
RBC #/AREA URNS AUTO: ABNORMAL /HPF
RBC UR QL AUTO: ABNORMAL UNIT/L
SODIUM SERPL-SCNC: 148 MMOL/L (ref 136–145)
SP GR UR STRIP.AUTO: 1.02
SQUAMOUS #/AREA URNS AUTO: ABNORMAL /HPF
TRIGL SERPL-MCNC: 276 MG/DL (ref 37–140)
TSH SERPL-ACNC: 2.12 UIU/ML (ref 0.35–4.94)
UROBILINOGEN UR STRIP-ACNC: 1 MG/DL
VIT B12 SERPL-MCNC: 552 PG/ML (ref 213–816)
VLDLC SERPL CALC-MCNC: 55 MG/DL
WBC # SPEC AUTO: 7.1 X10(3)/MCL (ref 4.5–11.5)
WBC #/AREA URNS AUTO: ABNORMAL /HPF

## 2023-04-10 PROCEDURE — 81001 URINALYSIS AUTO W/SCOPE: CPT

## 2023-04-10 PROCEDURE — 82306 VITAMIN D 25 HYDROXY: CPT

## 2023-04-10 PROCEDURE — 83036 HEMOGLOBIN GLYCOSYLATED A1C: CPT

## 2023-04-10 PROCEDURE — 80061 LIPID PANEL: CPT

## 2023-04-10 PROCEDURE — 85025 COMPLETE CBC W/AUTO DIFF WBC: CPT

## 2023-04-10 PROCEDURE — 84443 ASSAY THYROID STIM HORMONE: CPT

## 2023-04-10 PROCEDURE — 36415 COLL VENOUS BLD VENIPUNCTURE: CPT

## 2023-04-10 PROCEDURE — 80053 COMPREHEN METABOLIC PANEL: CPT

## 2023-04-10 PROCEDURE — 82607 VITAMIN B-12: CPT

## 2023-05-08 ENCOUNTER — HOSPITAL ENCOUNTER (EMERGENCY)
Facility: HOSPITAL | Age: 78
Discharge: HOME | End: 2023-05-08
Attending: EMERGENCY MEDICINE
Payer: MEDICARE

## 2023-05-08 VITALS
WEIGHT: 250 LBS | OXYGEN SATURATION: 97 % | HEART RATE: 66 BPM | RESPIRATION RATE: 18 BRPM | DIASTOLIC BLOOD PRESSURE: 59 MMHG | SYSTOLIC BLOOD PRESSURE: 139 MMHG | TEMPERATURE: 99 F | BODY MASS INDEX: 44.3 KG/M2 | HEIGHT: 63 IN

## 2023-05-08 DIAGNOSIS — R06.2 WHEEZING: Primary | ICD-10-CM

## 2023-05-08 DIAGNOSIS — R06.02 SOB (SHORTNESS OF BREATH): ICD-10-CM

## 2023-05-08 LAB
ALBUMIN SERPL-MCNC: 3.3 G/DL (ref 3.4–4.8)
ALBUMIN/GLOB SERPL: 1.1 RATIO (ref 1.1–2)
ALP SERPL-CCNC: 79 UNIT/L (ref 40–150)
ALT SERPL-CCNC: 11 UNIT/L (ref 0–55)
APPEARANCE UR: ABNORMAL
AST SERPL-CCNC: 16 UNIT/L (ref 5–34)
BACTERIA #/AREA URNS AUTO: ABNORMAL /HPF
BASOPHILS # BLD AUTO: 0.04 X10(3)/MCL
BASOPHILS NFR BLD AUTO: 0.6 %
BILIRUB UR QL STRIP.AUTO: NEGATIVE MG/DL
BILIRUBIN DIRECT+TOT PNL SERPL-MCNC: 0.3 MG/DL
BNP BLD-MCNC: 159.1 PG/ML
BUN SERPL-MCNC: 16.1 MG/DL (ref 9.8–20.1)
CALCIUM SERPL-MCNC: 8.7 MG/DL (ref 8.4–10.2)
CHLORIDE SERPL-SCNC: 107 MMOL/L (ref 98–107)
CO2 SERPL-SCNC: 29 MMOL/L (ref 23–31)
COLOR UR AUTO: ABNORMAL
CREAT SERPL-MCNC: 0.65 MG/DL (ref 0.55–1.02)
EOSINOPHIL # BLD AUTO: 0.28 X10(3)/MCL (ref 0–0.9)
EOSINOPHIL NFR BLD AUTO: 3.9 %
ERYTHROCYTE [DISTWIDTH] IN BLOOD BY AUTOMATED COUNT: 13.6 % (ref 11.5–17)
FLUAV AG UPPER RESP QL IA.RAPID: NOT DETECTED
FLUBV AG UPPER RESP QL IA.RAPID: NOT DETECTED
GFR SERPLBLD CREATININE-BSD FMLA CKD-EPI: >60 MLS/MIN/1.73/M2
GLOBULIN SER-MCNC: 2.9 GM/DL (ref 2.4–3.5)
GLUCOSE SERPL-MCNC: 103 MG/DL (ref 82–115)
GLUCOSE UR QL STRIP.AUTO: NEGATIVE MG/DL
HCT VFR BLD AUTO: 39.1 % (ref 37–47)
HGB BLD-MCNC: 11.5 G/DL (ref 12–16)
IMM GRANULOCYTES # BLD AUTO: 0.01 X10(3)/MCL (ref 0–0.04)
IMM GRANULOCYTES NFR BLD AUTO: 0.1 %
KETONES UR QL STRIP.AUTO: ABNORMAL MG/DL
LEUKOCYTE ESTERASE UR QL STRIP.AUTO: NEGATIVE UNIT/L
LYMPHOCYTES # BLD AUTO: 2.51 X10(3)/MCL (ref 0.6–4.6)
LYMPHOCYTES NFR BLD AUTO: 35 %
MCH RBC QN AUTO: 28 PG (ref 27–31)
MCHC RBC AUTO-ENTMCNC: 29.4 G/DL (ref 33–36)
MCV RBC AUTO: 95.1 FL (ref 80–94)
MONOCYTES # BLD AUTO: 0.4 X10(3)/MCL (ref 0.1–1.3)
MONOCYTES NFR BLD AUTO: 5.6 %
NEUTROPHILS # BLD AUTO: 3.93 X10(3)/MCL (ref 2.1–9.2)
NEUTROPHILS NFR BLD AUTO: 54.8 %
NITRITE UR QL STRIP.AUTO: NEGATIVE
PH UR STRIP.AUTO: 5.5 [PH]
PLATELET # BLD AUTO: 211 X10(3)/MCL (ref 130–400)
PMV BLD AUTO: 10.5 FL (ref 7.4–10.4)
POTASSIUM SERPL-SCNC: 3.5 MMOL/L (ref 3.5–5.1)
PROT SERPL-MCNC: 6.2 GM/DL (ref 5.8–7.6)
PROT UR QL STRIP.AUTO: NEGATIVE MG/DL
RBC # BLD AUTO: 4.11 X10(6)/MCL (ref 4.2–5.4)
RBC #/AREA URNS AUTO: ABNORMAL /HPF
RBC UR QL AUTO: ABNORMAL UNIT/L
SARS-COV-2 RNA RESP QL NAA+PROBE: NOT DETECTED
SODIUM SERPL-SCNC: 145 MMOL/L (ref 136–145)
SP GR UR STRIP.AUTO: 1.02
SQUAMOUS #/AREA URNS AUTO: ABNORMAL /HPF
UROBILINOGEN UR STRIP-ACNC: 0.2 MG/DL
WBC # SPEC AUTO: 7.17 X10(3)/MCL (ref 4.5–11.5)
WBC #/AREA URNS AUTO: ABNORMAL /HPF

## 2023-05-08 PROCEDURE — 83880 ASSAY OF NATRIURETIC PEPTIDE: CPT | Performed by: EMERGENCY MEDICINE

## 2023-05-08 PROCEDURE — 0240U COVID/FLU A&B PCR: CPT | Performed by: EMERGENCY MEDICINE

## 2023-05-08 PROCEDURE — 81001 URINALYSIS AUTO W/SCOPE: CPT | Performed by: EMERGENCY MEDICINE

## 2023-05-08 PROCEDURE — 93010 EKG 12-LEAD: ICD-10-PCS | Mod: ,,, | Performed by: STUDENT IN AN ORGANIZED HEALTH CARE EDUCATION/TRAINING PROGRAM

## 2023-05-08 PROCEDURE — 93010 ELECTROCARDIOGRAM REPORT: CPT | Mod: ,,, | Performed by: STUDENT IN AN ORGANIZED HEALTH CARE EDUCATION/TRAINING PROGRAM

## 2023-05-08 PROCEDURE — 99285 EMERGENCY DEPT VISIT HI MDM: CPT | Mod: 25,CS

## 2023-05-08 PROCEDURE — 93005 ELECTROCARDIOGRAM TRACING: CPT

## 2023-05-08 PROCEDURE — 85025 COMPLETE CBC W/AUTO DIFF WBC: CPT | Performed by: EMERGENCY MEDICINE

## 2023-05-08 PROCEDURE — 80053 COMPREHEN METABOLIC PANEL: CPT | Performed by: EMERGENCY MEDICINE

## 2023-05-08 RX ORDER — ALBUTEROL SULFATE 90 UG/1
1-2 AEROSOL, METERED RESPIRATORY (INHALATION) EVERY 6 HOURS PRN
Qty: 18 G | Refills: 0 | Status: SHIPPED | OUTPATIENT
Start: 2023-05-08 | End: 2023-09-28

## 2023-05-08 RX ORDER — MONTELUKAST SODIUM 10 MG/1
10 TABLET ORAL NIGHTLY
Qty: 30 TABLET | Refills: 0 | Status: SHIPPED | OUTPATIENT
Start: 2023-05-08 | End: 2023-06-07

## 2023-05-08 NOTE — ED PROVIDER NOTES
Encounter Date: 5/8/2023       History     Chief Complaint   Patient presents with    Shortness of Breath     SOB x 1 day with dry cough -also c/o pain to L shoulder      This 77-year-old female presents to the emergency room with complaints of being short of breath since yesterday.  Her shortness of breath is primarily when she tries to walk.  She states she wheezes and become short of breath.  She sleeps in her bed but uses 2 pillows.  She does not have any PND.  She also complains of left shoulder pain (which is reproducible on palpation).  She states that she has Lasix but it no longer makes her urinate very much.  She also has had a dry cough for the past 24 hours.         Review of patient's allergies indicates:   Allergen Reactions    Adhesive      Other reaction(s): rash    Adhesive tape-silicones      Other reaction(s): rash    Dexlansoprazole      HA and did not work    Hydrocodone-acetaminophen      Other reaction(s): anxious, itching    Lovastatin     Meperidine      Other reaction(s): N/v    Simvastatin      Past Medical History:   Diagnosis Date    High cholesterol     Hypertension     Paroxysmal atrial fibrillation      Past Surgical History:   Procedure Laterality Date    CARPAL TUNNEL RELEASE      CHOLECYSTECTOMY      GASTRIC BYPASS      hysterectomy  1990    JOINT REPLACEMENT      TONSILLECTOMY       History reviewed. No pertinent family history.  Social History     Tobacco Use    Smoking status: Never    Smokeless tobacco: Never   Substance Use Topics    Alcohol use: Never     Review of Systems   Constitutional:  Negative for fever.   HENT:  Negative for sore throat.    Respiratory:  Positive for cough and shortness of breath.    Cardiovascular:  Negative for chest pain.   Gastrointestinal:  Negative for nausea.   Genitourinary:  Negative for dysuria.   Musculoskeletal:  Positive for back pain.   Skin:  Negative for rash.   Neurological:  Negative for weakness.   Hematological:  Does not  bruise/bleed easily.     Physical Exam     Initial Vitals [05/08/23 1644]   BP Pulse Resp Temp SpO2   (!) 159/97 70 20 98.6 °F (37 °C) 96 %      MAP       --         Physical Exam    Nursing note and vitals reviewed.  Constitutional: She appears well-developed and well-nourished.   HENT:   Head: Normocephalic and atraumatic.   Eyes: Conjunctivae and EOM are normal. Pupils are equal, round, and reactive to light.   Neck: Neck supple.   Normal range of motion.  Cardiovascular:  Normal rate, regular rhythm, normal heart sounds and intact distal pulses.           Pulmonary/Chest: Breath sounds normal.   Abdominal: Abdomen is soft. Bowel sounds are normal.   Musculoskeletal:         General: Normal range of motion.      Cervical back: Normal range of motion and neck supple.     Neurological: She is alert and oriented to person, place, and time. She has normal strength.   Skin: Skin is warm and dry. Capillary refill takes less than 2 seconds.   Psychiatric: She has a normal mood and affect. Her behavior is normal. Judgment and thought content normal.       ED Course   Procedures  Labs Reviewed   COMPREHENSIVE METABOLIC PANEL - Abnormal; Notable for the following components:       Result Value    Albumin Level 3.3 (*)     All other components within normal limits   URINALYSIS, REFLEX TO URINE CULTURE - Abnormal; Notable for the following components:    Appearance, UA Slightly Cloudy (*)     Ketones, UA Trace (*)     Blood, UA Trace-Intact (*)     All other components within normal limits   CBC WITH DIFFERENTIAL - Abnormal; Notable for the following components:    RBC 4.11 (*)     Hgb 11.5 (*)     MCV 95.1 (*)     MCHC 29.4 (*)     MPV 10.5 (*)     All other components within normal limits   B-TYPE NATRIURETIC PEPTIDE - Abnormal; Notable for the following components:    Natriuretic Peptide 159.1 (*)     All other components within normal limits   URINALYSIS, MICROSCOPIC - Abnormal; Notable for the following components:     Squamous Epithelial Cells, UA Few (*)     All other components within normal limits   COVID/FLU A&B PCR - Normal    Narrative:     The Xpert Xpress SARS-CoV-2/FLU/RSV plus is a rapid, multiplexed real-time PCR test intended for the simultaneous qualitative detection and differentiation of SARS-CoV-2, Influenza A, Influenza B, and respiratory syncytial virus (RSV) viral RNA in either nasopharyngeal swab or nasal swab specimens.         CBC W/ AUTO DIFFERENTIAL    Narrative:     The following orders were created for panel order CBC auto differential.  Procedure                               Abnormality         Status                     ---------                               -----------         ------                     CBC with Differential[[id number]]        Abnormal            Final result                 Please view results for these tests on the individual orders.     EKG Readings: (Independently Interpreted)   Rhythm: Normal Sinus Rhythm. Heart Rate: 70. Ectopy: No Ectopy. Conduction: Normal. ST Segments: Normal ST Segments. T Waves: Normal. Axis: Normal.   5/8/23 1643     Imaging Results              X-Ray Chest 1 View (Final result)  Result time 05/08/23 18:30:11      Final result by Austen Willard MD (05/08/23 18:30:11)                   Impression:      No acute findings in the chest      Electronically signed by: Austen Willard MD  Date:    05/08/2023  Time:    18:30               Narrative:    EXAMINATION:  XR CHEST 1 VIEW    CLINICAL HISTORY:  Shortness of breath    COMPARISON:  08/20/2019    FINDINGS:  Single view of the chest shows no focal consolidation, pneumothorax or pleural effusion.  Cardiac silhouette is upper normal in size.  Aorta is partially calcified.                                       Medications - No data to display  Medical Decision Making:   Initial Assessment:   Dr. Victor Manuel PAULSON, assumed care of this patient at 6:00 p.m.; at this time patient is breathing comfortably, no acute distress;  "initial complaint was short of breath and wheezing especially when she exerts herself  Differential Diagnosis:   Reactive airways disease, bronchitis, CHF, pneumonia  ED Management:  In reviewing lab work there was no remarkable findings for CHF or infection; patient had a normal x-ray without evidence of pneumonia or infiltrates, no pulmonary edema; her BNP was normal; her white blood cell count was normal; her EKG showed normal sinus rhythm; on exam her lung sounds were clear; she admits to using ProAir inhaler twice a day and says this does help; I will prescribe her Singulair 10 mg a day and encouraged follow up with the pulmonologist for PFTs and continue ProAir as needed                 Patient Vitals for the past 24 hrs:   BP Temp Temp src Pulse Resp SpO2 Height Weight   05/08/23 1900 -- -- -- 66 18 97 % -- --   05/08/23 1845 -- -- -- 72 19 96 % -- --   05/08/23 1823 (!) 139/59 -- -- 68 16 96 % -- --   05/08/23 1730 (!) 179/84 -- -- 65 (!) 23 (!) 94 % -- --   05/08/23 1644 (!) 159/97 98.6 °F (37 °C) Oral 70 20 96 % 5' 3" (1.6 m) 113.4 kg (250 lb)   The patient is resting comfortably and in no acute distress.  She states that her symptoms have improved after treatment in Emergency Department. I personally discussed her test results and treatment plan.  Gave strict ED precautions.  Specific conditions for return to the emergency department and importance of follow up with her primary care provided or the physician listed on the discharge instructions.  Patient voices understanding and agrees to the plan discussed. All patients' questions have been answered at this time.   She has remained hemodynamically stable throughout entire stay in ED and is stable for discharge home.          Clinical Impression:   Final diagnoses:  [R06.02] SOB (shortness of breath)  [R06.2] Wheezing (Primary)        ED Disposition Condition    Discharge Stable          ED Prescriptions       Medication Sig Dispense Start Date End Date " Auth. Provider    montelukast (SINGULAIR) 10 mg tablet Take 1 tablet (10 mg total) by mouth every evening. 30 tablet 5/8/2023 6/7/2023 Cristian Rush MD    albuterol (PROVENTIL/VENTOLIN HFA) 90 mcg/actuation inhaler Inhale 1-2 puffs into the lungs every 6 (six) hours as needed for Wheezing. Rescue 18 g 5/8/2023 -- Cristian Rush MD          Follow-up Information       Follow up With Specialties Details Why Contact Info    Stephanie Sotelo MD Family Medicine Schedule an appointment as soon as possible for a visit  Consider pulmonary appointment for PFTs [address] [place] [id number]  [phone]               Cristian Rush MD  05/09/23 0105

## 2023-07-27 ENCOUNTER — LAB VISIT (OUTPATIENT)
Dept: LAB | Facility: HOSPITAL | Age: 78
End: 2023-07-27
Attending: FAMILY MEDICINE
Payer: MEDICARE

## 2023-07-27 DIAGNOSIS — D64.9 ANEMIA, UNSPECIFIED TYPE: ICD-10-CM

## 2023-07-27 DIAGNOSIS — E11.65 TYPE 2 DIABETES MELLITUS WITH HYPERGLYCEMIA, WITHOUT LONG-TERM CURRENT USE OF INSULIN: ICD-10-CM

## 2023-07-27 LAB
ALBUMIN SERPL-MCNC: 3.9 G/DL (ref 3.4–4.8)
ALBUMIN/GLOB SERPL: 1.4 RATIO (ref 1.1–2)
ALP SERPL-CCNC: 92 UNIT/L (ref 40–150)
ALT SERPL-CCNC: 11 UNIT/L (ref 0–55)
AST SERPL-CCNC: 15 UNIT/L (ref 5–34)
BASOPHILS # BLD AUTO: 0.05 X10(3)/MCL
BASOPHILS NFR BLD AUTO: 0.6 %
BILIRUBIN DIRECT+TOT PNL SERPL-MCNC: 0.3 MG/DL
BUN SERPL-MCNC: 16.8 MG/DL (ref 9.8–20.1)
CALCIUM SERPL-MCNC: 9.7 MG/DL (ref 8.4–10.2)
CHLORIDE SERPL-SCNC: 106 MMOL/L (ref 98–107)
CO2 SERPL-SCNC: 31 MMOL/L (ref 23–31)
CREAT SERPL-MCNC: 0.75 MG/DL (ref 0.55–1.02)
EOSINOPHIL # BLD AUTO: 0.25 X10(3)/MCL (ref 0–0.9)
EOSINOPHIL NFR BLD AUTO: 2.9 %
ERYTHROCYTE [DISTWIDTH] IN BLOOD BY AUTOMATED COUNT: 13.4 % (ref 11.5–17)
EST. AVERAGE GLUCOSE BLD GHB EST-MCNC: 128.4 MG/DL
FERRITIN SERPL-MCNC: 13.5 NG/ML (ref 4.63–204)
GFR SERPLBLD CREATININE-BSD FMLA CKD-EPI: >60 MLS/MIN/1.73/M2
GLOBULIN SER-MCNC: 2.8 GM/DL (ref 2.4–3.5)
GLUCOSE SERPL-MCNC: 102 MG/DL (ref 82–115)
HBA1C MFR BLD: 6.1 %
HCT VFR BLD AUTO: 40.5 % (ref 37–47)
HGB BLD-MCNC: 12 G/DL (ref 12–16)
IMM GRANULOCYTES # BLD AUTO: 0 X10(3)/MCL (ref 0–0.04)
IMM GRANULOCYTES NFR BLD AUTO: 0 %
IRON SATN MFR SERPL: 14 % (ref 20–50)
IRON SERPL-MCNC: 50 UG/DL (ref 50–170)
LYMPHOCYTES # BLD AUTO: 3.31 X10(3)/MCL (ref 0.6–4.6)
LYMPHOCYTES NFR BLD AUTO: 38.5 %
MCH RBC QN AUTO: 26.9 PG (ref 27–31)
MCHC RBC AUTO-ENTMCNC: 29.6 G/DL (ref 33–36)
MCV RBC AUTO: 90.8 FL (ref 80–94)
MONOCYTES # BLD AUTO: 0.52 X10(3)/MCL (ref 0.1–1.3)
MONOCYTES NFR BLD AUTO: 6 %
NEUTROPHILS # BLD AUTO: 4.47 X10(3)/MCL (ref 2.1–9.2)
NEUTROPHILS NFR BLD AUTO: 52 %
PLATELET # BLD AUTO: 272 X10(3)/MCL (ref 130–400)
PMV BLD AUTO: 10.5 FL (ref 7.4–10.4)
POTASSIUM SERPL-SCNC: 3.8 MMOL/L (ref 3.5–5.1)
PROT SERPL-MCNC: 6.7 GM/DL (ref 5.8–7.6)
RBC # BLD AUTO: 4.46 X10(6)/MCL (ref 4.2–5.4)
SODIUM SERPL-SCNC: 147 MMOL/L (ref 136–145)
TIBC SERPL-MCNC: 313 UG/DL (ref 70–310)
TIBC SERPL-MCNC: 363 UG/DL (ref 250–450)
TRANSFERRIN SERPL-MCNC: 334 MG/DL (ref 173–360)
WBC # SPEC AUTO: 8.6 X10(3)/MCL (ref 4.5–11.5)

## 2023-07-27 PROCEDURE — 85025 COMPLETE CBC W/AUTO DIFF WBC: CPT

## 2023-07-27 PROCEDURE — 36415 COLL VENOUS BLD VENIPUNCTURE: CPT

## 2023-07-27 PROCEDURE — 83550 IRON BINDING TEST: CPT

## 2023-07-27 PROCEDURE — 82728 ASSAY OF FERRITIN: CPT

## 2023-07-27 PROCEDURE — 83036 HEMOGLOBIN GLYCOSYLATED A1C: CPT

## 2023-07-27 PROCEDURE — 80053 COMPREHEN METABOLIC PANEL: CPT

## 2023-09-28 PROBLEM — I50.9 CONGESTIVE HEART FAILURE, UNSPECIFIED HF CHRONICITY, UNSPECIFIED HEART FAILURE TYPE: Status: ACTIVE | Noted: 2023-09-28

## 2023-11-25 ENCOUNTER — HOSPITAL ENCOUNTER (OUTPATIENT)
Facility: HOSPITAL | Age: 78
Discharge: HOME HEALTH | End: 2023-12-03
Attending: SPECIALIST | Admitting: STUDENT IN AN ORGANIZED HEALTH CARE EDUCATION/TRAINING PROGRAM
Payer: MEDICARE

## 2023-11-25 DIAGNOSIS — I48.0 PAROXYSMAL ATRIAL FIBRILLATION: ICD-10-CM

## 2023-11-25 DIAGNOSIS — I48.91 ATRIAL FIBRILLATION: ICD-10-CM

## 2023-11-25 DIAGNOSIS — R03.0 ELEVATED BLOOD PRESSURE READING: ICD-10-CM

## 2023-11-25 DIAGNOSIS — R07.89 CHEST WALL PAIN: ICD-10-CM

## 2023-11-25 DIAGNOSIS — R07.9 CHEST PAIN: ICD-10-CM

## 2023-11-25 DIAGNOSIS — J20.5 ACUTE BRONCHITIS DUE TO RESPIRATORY SYNCYTIAL VIRUS (RSV): Primary | ICD-10-CM

## 2023-11-25 DIAGNOSIS — R06.2 WHEEZING: ICD-10-CM

## 2023-11-25 LAB
ALBUMIN SERPL-MCNC: 3.5 G/DL (ref 3.4–4.8)
ALBUMIN/GLOB SERPL: 1.1 RATIO (ref 1.1–2)
ALP SERPL-CCNC: 114 UNIT/L (ref 40–150)
ALT SERPL-CCNC: 49 UNIT/L (ref 0–55)
AST SERPL-CCNC: 26 UNIT/L (ref 5–34)
BASOPHILS # BLD AUTO: 0.02 X10(3)/MCL
BASOPHILS NFR BLD AUTO: 0.2 %
BILIRUB SERPL-MCNC: 0.2 MG/DL
BUN SERPL-MCNC: 18.6 MG/DL (ref 9.8–20.1)
CALCIUM SERPL-MCNC: 9.4 MG/DL (ref 8.4–10.2)
CHLORIDE SERPL-SCNC: 108 MMOL/L (ref 98–107)
CO2 SERPL-SCNC: 23 MMOL/L (ref 23–31)
CREAT SERPL-MCNC: 0.73 MG/DL (ref 0.55–1.02)
EOSINOPHIL # BLD AUTO: 0 X10(3)/MCL (ref 0–0.9)
EOSINOPHIL NFR BLD AUTO: 0 %
ERYTHROCYTE [DISTWIDTH] IN BLOOD BY AUTOMATED COUNT: 16.2 % (ref 11.5–17)
EST. AVERAGE GLUCOSE BLD GHB EST-MCNC: 134.1 MG/DL
FLUAV AG UPPER RESP QL IA.RAPID: NOT DETECTED
FLUBV AG UPPER RESP QL IA.RAPID: NOT DETECTED
GFR SERPLBLD CREATININE-BSD FMLA CKD-EPI: >60 MLS/MIN/1.73/M2
GLOBULIN SER-MCNC: 3.3 GM/DL (ref 2.4–3.5)
GLUCOSE SERPL-MCNC: 140 MG/DL (ref 82–115)
HBA1C MFR BLD: 6.3 %
HCT VFR BLD AUTO: 37.1 % (ref 37–47)
HGB BLD-MCNC: 10.9 G/DL (ref 12–16)
IMM GRANULOCYTES # BLD AUTO: 0.02 X10(3)/MCL (ref 0–0.04)
IMM GRANULOCYTES NFR BLD AUTO: 0.2 %
LYMPHOCYTES # BLD AUTO: 2.1 X10(3)/MCL (ref 0.6–4.6)
LYMPHOCYTES NFR BLD AUTO: 25 %
MCH RBC QN AUTO: 26.2 PG (ref 27–31)
MCHC RBC AUTO-ENTMCNC: 29.4 G/DL (ref 33–36)
MCV RBC AUTO: 89.2 FL (ref 80–94)
MONOCYTES # BLD AUTO: 0.45 X10(3)/MCL (ref 0.1–1.3)
MONOCYTES NFR BLD AUTO: 5.4 %
NEUTROPHILS # BLD AUTO: 5.8 X10(3)/MCL (ref 2.1–9.2)
NEUTROPHILS NFR BLD AUTO: 69.2 %
PLATELET # BLD AUTO: 238 X10(3)/MCL (ref 130–400)
PMV BLD AUTO: 10.8 FL (ref 7.4–10.4)
POTASSIUM SERPL-SCNC: 3.7 MMOL/L (ref 3.5–5.1)
PROT SERPL-MCNC: 6.8 GM/DL (ref 5.8–7.6)
RBC # BLD AUTO: 4.16 X10(6)/MCL (ref 4.2–5.4)
RSV A 5' UTR RNA NPH QL NAA+PROBE: DETECTED
SARS-COV-2 RNA RESP QL NAA+PROBE: NOT DETECTED
SODIUM SERPL-SCNC: 146 MMOL/L (ref 136–145)
WBC # SPEC AUTO: 8.39 X10(3)/MCL (ref 4.5–11.5)

## 2023-11-25 PROCEDURE — 99285 EMERGENCY DEPT VISIT HI MDM: CPT | Mod: 25

## 2023-11-25 PROCEDURE — 96374 THER/PROPH/DIAG INJ IV PUSH: CPT

## 2023-11-25 PROCEDURE — G0378 HOSPITAL OBSERVATION PER HR: HCPCS

## 2023-11-25 PROCEDURE — 85025 COMPLETE CBC W/AUTO DIFF WBC: CPT | Performed by: SPECIALIST

## 2023-11-25 PROCEDURE — 94760 N-INVAS EAR/PLS OXIMETRY 1: CPT

## 2023-11-25 PROCEDURE — 83036 HEMOGLOBIN GLYCOSYLATED A1C: CPT | Performed by: SPECIALIST

## 2023-11-25 PROCEDURE — 80053 COMPREHEN METABOLIC PANEL: CPT | Performed by: SPECIALIST

## 2023-11-25 PROCEDURE — 25000242 PHARM REV CODE 250 ALT 637 W/ HCPCS: Performed by: SPECIALIST

## 2023-11-25 PROCEDURE — 0241U COVID/RSV/FLU A&B PCR: CPT | Performed by: SPECIALIST

## 2023-11-25 PROCEDURE — 93010 ELECTROCARDIOGRAM REPORT: CPT | Mod: ,,, | Performed by: STUDENT IN AN ORGANIZED HEALTH CARE EDUCATION/TRAINING PROGRAM

## 2023-11-25 PROCEDURE — 93005 ELECTROCARDIOGRAM TRACING: CPT

## 2023-11-25 PROCEDURE — 63600175 PHARM REV CODE 636 W HCPCS: Performed by: SPECIALIST

## 2023-11-25 PROCEDURE — 94640 AIRWAY INHALATION TREATMENT: CPT

## 2023-11-25 PROCEDURE — 93010 EKG 12-LEAD: ICD-10-PCS | Mod: ,,, | Performed by: STUDENT IN AN ORGANIZED HEALTH CARE EDUCATION/TRAINING PROGRAM

## 2023-11-25 PROCEDURE — 99900031 HC PATIENT EDUCATION (STAT)

## 2023-11-25 RX ORDER — IPRATROPIUM BROMIDE AND ALBUTEROL SULFATE 2.5; .5 MG/3ML; MG/3ML
3 SOLUTION RESPIRATORY (INHALATION)
Status: COMPLETED | OUTPATIENT
Start: 2023-11-25 | End: 2023-11-25

## 2023-11-25 RX ORDER — IBUPROFEN 200 MG
24 TABLET ORAL
Status: DISCONTINUED | OUTPATIENT
Start: 2023-11-26 | End: 2023-11-26

## 2023-11-25 RX ORDER — SODIUM CHLORIDE 0.9 % (FLUSH) 0.9 %
2 SYRINGE (ML) INJECTION EVERY 6 HOURS PRN
Status: DISCONTINUED | OUTPATIENT
Start: 2023-11-26 | End: 2023-11-26

## 2023-11-25 RX ORDER — DEXAMETHASONE SODIUM PHOSPHATE 4 MG/ML
6 INJECTION, SOLUTION INTRA-ARTICULAR; INTRALESIONAL; INTRAMUSCULAR; INTRAVENOUS; SOFT TISSUE ONCE
Status: COMPLETED | OUTPATIENT
Start: 2023-11-25 | End: 2023-11-25

## 2023-11-25 RX ORDER — GLUCAGON 1 MG
1 KIT INJECTION
Status: DISCONTINUED | OUTPATIENT
Start: 2023-11-26 | End: 2023-11-26

## 2023-11-25 RX ORDER — ONDANSETRON 4 MG/1
8 TABLET, ORALLY DISINTEGRATING ORAL EVERY 8 HOURS PRN
Status: DISCONTINUED | OUTPATIENT
Start: 2023-11-26 | End: 2023-12-03 | Stop reason: HOSPADM

## 2023-11-25 RX ORDER — ACETAMINOPHEN 325 MG/1
650 TABLET ORAL EVERY 8 HOURS PRN
Status: DISCONTINUED | OUTPATIENT
Start: 2023-11-26 | End: 2023-11-26

## 2023-11-25 RX ORDER — GUAIFENESIN/DEXTROMETHORPHAN 100-10MG/5
10 SYRUP ORAL EVERY 4 HOURS PRN
Status: DISCONTINUED | OUTPATIENT
Start: 2023-11-26 | End: 2023-12-03 | Stop reason: HOSPADM

## 2023-11-25 RX ORDER — IBUPROFEN 200 MG
16 TABLET ORAL
Status: DISCONTINUED | OUTPATIENT
Start: 2023-11-26 | End: 2023-11-26

## 2023-11-25 RX ORDER — TALC
6 POWDER (GRAM) TOPICAL NIGHTLY PRN
Status: DISCONTINUED | OUTPATIENT
Start: 2023-11-25 | End: 2023-11-26

## 2023-11-25 RX ORDER — FAMOTIDINE 20 MG/1
20 TABLET, FILM COATED ORAL 2 TIMES DAILY
Status: DISCONTINUED | OUTPATIENT
Start: 2023-11-26 | End: 2023-12-03 | Stop reason: HOSPADM

## 2023-11-25 RX ORDER — DEXAMETHASONE SODIUM PHOSPHATE 4 MG/ML
6 INJECTION, SOLUTION INTRA-ARTICULAR; INTRALESIONAL; INTRAMUSCULAR; INTRAVENOUS; SOFT TISSUE
Status: DISCONTINUED | OUTPATIENT
Start: 2023-11-25 | End: 2023-11-25

## 2023-11-25 RX ORDER — IPRATROPIUM BROMIDE AND ALBUTEROL SULFATE 2.5; .5 MG/3ML; MG/3ML
3 SOLUTION RESPIRATORY (INHALATION) EVERY 6 HOURS
Status: DISCONTINUED | OUTPATIENT
Start: 2023-11-26 | End: 2023-11-27

## 2023-11-25 RX ORDER — TRAMADOL HYDROCHLORIDE 50 MG/1
50 TABLET ORAL EVERY 6 HOURS PRN
Status: DISCONTINUED | OUTPATIENT
Start: 2023-11-26 | End: 2023-12-03 | Stop reason: HOSPADM

## 2023-11-25 RX ORDER — CLONIDINE HYDROCHLORIDE 0.1 MG/1
0.1 TABLET ORAL EVERY 4 HOURS PRN
Status: DISCONTINUED | OUTPATIENT
Start: 2023-11-26 | End: 2023-12-03 | Stop reason: HOSPADM

## 2023-11-25 RX ADMIN — IPRATROPIUM BROMIDE AND ALBUTEROL SULFATE 3 ML: .5; 3 SOLUTION RESPIRATORY (INHALATION) at 09:11

## 2023-11-25 RX ADMIN — CLONIDINE HYDROCHLORIDE 0.1 MG: 0.1 TABLET ORAL at 11:11

## 2023-11-25 RX ADMIN — ACETAMINOPHEN 650 MG: 325 TABLET ORAL at 11:11

## 2023-11-25 RX ADMIN — GUAIFENESIN AND DEXTROMETHORPHAN 10 ML: 100; 10 SYRUP ORAL at 11:11

## 2023-11-25 RX ADMIN — DEXAMETHASONE SODIUM PHOSPHATE 6 MG: 4 INJECTION, SOLUTION INTRA-ARTICULAR; INTRALESIONAL; INTRAMUSCULAR; INTRAVENOUS; SOFT TISSUE at 09:11

## 2023-11-26 PROBLEM — J20.5 ACUTE BRONCHITIS DUE TO RESPIRATORY SYNCYTIAL VIRUS (RSV): Status: ACTIVE | Noted: 2023-11-26

## 2023-11-26 LAB
ANION GAP SERPL CALC-SCNC: 10 MEQ/L
BASOPHILS # BLD AUTO: 0 X10(3)/MCL
BASOPHILS NFR BLD AUTO: 0 %
BUN SERPL-MCNC: 18 MG/DL (ref 9.8–20.1)
CALCIUM SERPL-MCNC: 8.7 MG/DL (ref 8.4–10.2)
CHLORIDE SERPL-SCNC: 108 MMOL/L (ref 98–107)
CO2 SERPL-SCNC: 26 MMOL/L (ref 23–31)
CREAT SERPL-MCNC: 0.69 MG/DL (ref 0.55–1.02)
CREAT/UREA NIT SERPL: 26
EOSINOPHIL # BLD AUTO: 0 X10(3)/MCL (ref 0–0.9)
EOSINOPHIL NFR BLD AUTO: 0 %
ERYTHROCYTE [DISTWIDTH] IN BLOOD BY AUTOMATED COUNT: 16.4 % (ref 11.5–17)
GFR SERPLBLD CREATININE-BSD FMLA CKD-EPI: >60 MLS/MIN/1.73/M2
GLUCOSE SERPL-MCNC: 185 MG/DL (ref 82–115)
HCT VFR BLD AUTO: 35.1 % (ref 37–47)
HGB BLD-MCNC: 10.2 G/DL (ref 12–16)
IMM GRANULOCYTES # BLD AUTO: 0.02 X10(3)/MCL (ref 0–0.04)
IMM GRANULOCYTES NFR BLD AUTO: 0.2 %
LYMPHOCYTES # BLD AUTO: 1.4 X10(3)/MCL (ref 0.6–4.6)
LYMPHOCYTES NFR BLD AUTO: 17.3 %
MAGNESIUM SERPL-MCNC: 1.9 MG/DL (ref 1.6–2.6)
MCH RBC QN AUTO: 26.4 PG (ref 27–31)
MCHC RBC AUTO-ENTMCNC: 29.1 G/DL (ref 33–36)
MCV RBC AUTO: 90.7 FL (ref 80–94)
MONOCYTES # BLD AUTO: 0.09 X10(3)/MCL (ref 0.1–1.3)
MONOCYTES NFR BLD AUTO: 1.1 %
NEUTROPHILS # BLD AUTO: 6.6 X10(3)/MCL (ref 2.1–9.2)
NEUTROPHILS NFR BLD AUTO: 81.4 %
PLATELET # BLD AUTO: 199 X10(3)/MCL (ref 130–400)
PMV BLD AUTO: 10.9 FL (ref 7.4–10.4)
POCT GLUCOSE: 126 MG/DL (ref 70–110)
POCT GLUCOSE: 142 MG/DL (ref 70–110)
POTASSIUM SERPL-SCNC: 4 MMOL/L (ref 3.5–5.1)
RBC # BLD AUTO: 3.87 X10(6)/MCL (ref 4.2–5.4)
SODIUM SERPL-SCNC: 144 MMOL/L (ref 136–145)
WBC # SPEC AUTO: 8.11 X10(3)/MCL (ref 4.5–11.5)

## 2023-11-26 PROCEDURE — 94760 N-INVAS EAR/PLS OXIMETRY 1: CPT

## 2023-11-26 PROCEDURE — 27000190 HC CPAP FULL FACE MASK W/VALVE

## 2023-11-26 PROCEDURE — 99900035 HC TECH TIME PER 15 MIN (STAT)

## 2023-11-26 PROCEDURE — 63600175 PHARM REV CODE 636 W HCPCS: Performed by: STUDENT IN AN ORGANIZED HEALTH CARE EDUCATION/TRAINING PROGRAM

## 2023-11-26 PROCEDURE — 25000003 PHARM REV CODE 250: Performed by: SPECIALIST

## 2023-11-26 PROCEDURE — 94640 AIRWAY INHALATION TREATMENT: CPT | Mod: XB

## 2023-11-26 PROCEDURE — 25000003 PHARM REV CODE 250: Performed by: STUDENT IN AN ORGANIZED HEALTH CARE EDUCATION/TRAINING PROGRAM

## 2023-11-26 PROCEDURE — 80048 BASIC METABOLIC PNL TOTAL CA: CPT | Performed by: STUDENT IN AN ORGANIZED HEALTH CARE EDUCATION/TRAINING PROGRAM

## 2023-11-26 PROCEDURE — 83735 ASSAY OF MAGNESIUM: CPT | Performed by: STUDENT IN AN ORGANIZED HEALTH CARE EDUCATION/TRAINING PROGRAM

## 2023-11-26 PROCEDURE — 85025 COMPLETE CBC W/AUTO DIFF WBC: CPT | Performed by: STUDENT IN AN ORGANIZED HEALTH CARE EDUCATION/TRAINING PROGRAM

## 2023-11-26 PROCEDURE — 25000242 PHARM REV CODE 250 ALT 637 W/ HCPCS: Performed by: SPECIALIST

## 2023-11-26 PROCEDURE — G0378 HOSPITAL OBSERVATION PER HR: HCPCS

## 2023-11-26 PROCEDURE — 94660 CPAP INITIATION&MGMT: CPT

## 2023-11-26 RX ORDER — AMLODIPINE BESYLATE 5 MG/1
5 TABLET ORAL DAILY
Status: DISCONTINUED | OUTPATIENT
Start: 2023-11-26 | End: 2023-11-27

## 2023-11-26 RX ORDER — BENZONATATE 100 MG/1
200 CAPSULE ORAL 3 TIMES DAILY
Status: DISCONTINUED | OUTPATIENT
Start: 2023-11-26 | End: 2023-12-03 | Stop reason: HOSPADM

## 2023-11-26 RX ORDER — ACETAMINOPHEN 325 MG/1
650 TABLET ORAL EVERY 4 HOURS PRN
Status: DISCONTINUED | OUTPATIENT
Start: 2023-11-26 | End: 2023-12-03 | Stop reason: HOSPADM

## 2023-11-26 RX ORDER — LEVOTHYROXINE SODIUM 88 UG/1
88 TABLET ORAL DAILY
Status: DISCONTINUED | OUTPATIENT
Start: 2023-11-26 | End: 2023-12-03 | Stop reason: HOSPADM

## 2023-11-26 RX ORDER — METOPROLOL SUCCINATE 50 MG/1
50 TABLET, EXTENDED RELEASE ORAL DAILY
Status: DISCONTINUED | OUTPATIENT
Start: 2023-11-26 | End: 2023-11-28

## 2023-11-26 RX ORDER — ONDANSETRON 2 MG/ML
4 INJECTION INTRAMUSCULAR; INTRAVENOUS EVERY 8 HOURS PRN
Status: DISCONTINUED | OUTPATIENT
Start: 2023-11-26 | End: 2023-12-03 | Stop reason: HOSPADM

## 2023-11-26 RX ORDER — MAG HYDROX/ALUMINUM HYD/SIMETH 200-200-20
30 SUSPENSION, ORAL (FINAL DOSE FORM) ORAL 4 TIMES DAILY PRN
Status: DISCONTINUED | OUTPATIENT
Start: 2023-11-26 | End: 2023-12-03 | Stop reason: HOSPADM

## 2023-11-26 RX ORDER — BISACODYL 10 MG
10 SUPPOSITORY, RECTAL RECTAL DAILY PRN
Status: DISCONTINUED | OUTPATIENT
Start: 2023-11-26 | End: 2023-12-03 | Stop reason: HOSPADM

## 2023-11-26 RX ORDER — SODIUM CHLORIDE 0.9 % (FLUSH) 0.9 %
10 SYRINGE (ML) INJECTION
Status: DISCONTINUED | OUTPATIENT
Start: 2023-11-26 | End: 2023-12-03 | Stop reason: HOSPADM

## 2023-11-26 RX ORDER — POLYETHYLENE GLYCOL 3350 17 G/17G
17 POWDER, FOR SOLUTION ORAL 3 TIMES DAILY PRN
Status: DISCONTINUED | OUTPATIENT
Start: 2023-11-26 | End: 2023-12-03 | Stop reason: HOSPADM

## 2023-11-26 RX ORDER — IPRATROPIUM BROMIDE AND ALBUTEROL SULFATE 2.5; .5 MG/3ML; MG/3ML
3 SOLUTION RESPIRATORY (INHALATION) EVERY 4 HOURS PRN
Status: ACTIVE | OUTPATIENT
Start: 2023-11-26 | End: 2023-11-29

## 2023-11-26 RX ORDER — GABAPENTIN 100 MG/1
200 CAPSULE ORAL NIGHTLY
Status: DISCONTINUED | OUTPATIENT
Start: 2023-11-26 | End: 2023-12-03 | Stop reason: HOSPADM

## 2023-11-26 RX ORDER — FUROSEMIDE 40 MG/1
40 TABLET ORAL DAILY
Status: DISCONTINUED | OUTPATIENT
Start: 2023-11-26 | End: 2023-12-03 | Stop reason: HOSPADM

## 2023-11-26 RX ORDER — SIMETHICONE 80 MG
1 TABLET,CHEWABLE ORAL 4 TIMES DAILY PRN
Status: DISCONTINUED | OUTPATIENT
Start: 2023-11-26 | End: 2023-12-03 | Stop reason: HOSPADM

## 2023-11-26 RX ORDER — THIAMINE HCL 100 MG
100 TABLET ORAL DAILY
Status: DISCONTINUED | OUTPATIENT
Start: 2023-11-26 | End: 2023-11-29

## 2023-11-26 RX ORDER — GABAPENTIN 100 MG/1
100 CAPSULE ORAL DAILY
Status: DISCONTINUED | OUTPATIENT
Start: 2023-11-26 | End: 2023-12-03 | Stop reason: HOSPADM

## 2023-11-26 RX ORDER — NALOXONE HCL 0.4 MG/ML
0.02 VIAL (ML) INJECTION
Status: DISCONTINUED | OUTPATIENT
Start: 2023-11-26 | End: 2023-12-03 | Stop reason: HOSPADM

## 2023-11-26 RX ORDER — TALC
9 POWDER (GRAM) TOPICAL NIGHTLY PRN
Status: DISCONTINUED | OUTPATIENT
Start: 2023-11-26 | End: 2023-12-03 | Stop reason: HOSPADM

## 2023-11-26 RX ADMIN — BENZONATATE 200 MG: 100 CAPSULE ORAL at 09:11

## 2023-11-26 RX ADMIN — LEVOTHYROXINE SODIUM 88 MCG: 88 TABLET ORAL at 10:11

## 2023-11-26 RX ADMIN — MULTIPLE VITAMINS W/ MINERALS TAB 1 TABLET: TAB at 03:11

## 2023-11-26 RX ADMIN — FAMOTIDINE 20 MG: 20 TABLET ORAL at 09:11

## 2023-11-26 RX ADMIN — BENZONATATE 200 MG: 100 CAPSULE ORAL at 10:11

## 2023-11-26 RX ADMIN — BENZONATATE 200 MG: 100 CAPSULE ORAL at 03:11

## 2023-11-26 RX ADMIN — PREDNISONE 50 MG: 20 TABLET ORAL at 10:11

## 2023-11-26 RX ADMIN — RIVAROXABAN 20 MG: 20 TABLET, FILM COATED ORAL at 04:11

## 2023-11-26 RX ADMIN — FUROSEMIDE 40 MG: 40 TABLET ORAL at 10:11

## 2023-11-26 RX ADMIN — AMLODIPINE BESYLATE 5 MG: 5 TABLET ORAL at 10:11

## 2023-11-26 RX ADMIN — IPRATROPIUM BROMIDE AND ALBUTEROL SULFATE 3 ML: .5; 3 SOLUTION RESPIRATORY (INHALATION) at 01:11

## 2023-11-26 RX ADMIN — IPRATROPIUM BROMIDE AND ALBUTEROL SULFATE 3 ML: .5; 3 SOLUTION RESPIRATORY (INHALATION) at 12:11

## 2023-11-26 RX ADMIN — GABAPENTIN 200 MG: 100 CAPSULE ORAL at 09:11

## 2023-11-26 RX ADMIN — MELATONIN TAB 3 MG 9 MG: 3 TAB at 09:11

## 2023-11-26 RX ADMIN — THIAMINE HCL TAB 100 MG 100 MG: 100 TAB at 03:11

## 2023-11-26 RX ADMIN — IPRATROPIUM BROMIDE AND ALBUTEROL SULFATE 3 ML: .5; 3 SOLUTION RESPIRATORY (INHALATION) at 07:11

## 2023-11-26 RX ADMIN — GUAIFENESIN AND DEXTROMETHORPHAN 10 ML: 100; 10 SYRUP ORAL at 04:11

## 2023-11-26 RX ADMIN — MELATONIN TAB 3 MG 6 MG: 3 TAB at 12:11

## 2023-11-26 RX ADMIN — GABAPENTIN 100 MG: 100 CAPSULE ORAL at 10:11

## 2023-11-26 RX ADMIN — TRAMADOL HYDROCHLORIDE 50 MG: 50 TABLET, COATED ORAL at 03:11

## 2023-11-26 RX ADMIN — FAMOTIDINE 20 MG: 20 TABLET ORAL at 10:11

## 2023-11-26 RX ADMIN — METOPROLOL SUCCINATE 50 MG: 50 TABLET, FILM COATED, EXTENDED RELEASE ORAL at 10:11

## 2023-11-26 NOTE — H&P
[facility] - Medical Surgical Unit  [facility] MEDICINE - H&P ADMISSION NOTE    Patient Name: Vesta Lala  MRN: [id number]  Patient Class: OP- Observation   Admission Date: 11/25/2023   Admitting Physician: CLEMENTE Service   Attending Physician: Thairy G Reyes, DO  Primary Care Provider: Stephanie Sotelo MD  Face-to-Face encounter date: 11/26/2023        CHIEF COMPLAINT     Chief Complaint   Patient presents with    Cough     Pt dx with RSV yesterday but states cough is worse -c/o R rib pain        HISTORY OF PRESENTING ILLNESS   79 yo female with PMH of HTN, spinal stenosis, HLD, CLAY (has not had a functioning CPAP for 6 months), AF and gastric sleeve who presents with complaint of wheezing and cough that started 6 days ago. She was prescribed a Z-johnson and an inhaler and completed the course. Symptoms did not improve so she followed up at a walk in clinic on Friday where she tested + for RSV. She was given a steroid shot but symptoms continued to worsen and she presented to our ED. At baseline patient lives alone and ambulates with a walker.   PAST MEDICAL HISTORY     Past Medical History:   Diagnosis Date    High cholesterol     Hypertension     Internal hemorrhoids 07/2023    Paroxysmal atrial fibrillation        PAST SURGICAL HISTORY     Past Surgical History:   Procedure Laterality Date    CARPAL TUNNEL RELEASE      CHOLECYSTECTOMY      GASTRIC BYPASS      hysterectomy  1990    JOINT REPLACEMENT      TONSILLECTOMY         FAMILY HISTORY   Reviewed and noncontributory to this case    SOCIAL HISTORY     Social History     Socioeconomic History    Marital status:    Tobacco Use    Smoking status: Never    Smokeless tobacco: Never   Substance and Sexual Activity    Alcohol use: Never       HOME MEDICATIONS     Prior to Admission medications    Medication Sig Start Date End Date Taking? Authorizing Provider   amLODIPine (NORVASC) 5 MG tablet TAKE 1 TABLET EVERY DAY 3/28/23  Yes Stephanie Sotelo MD    docusate sodium (COLACE) 100 MG capsule Take 100 mg by mouth 2 (two) times daily as needed for Constipation.   Yes Provider, Historical   ergocalciferol (ERGOCALCIFEROL) 50,000 unit Cap Take 50,000 Units by mouth every 7 days.   Yes Provider, Historical   furosemide (LASIX) 40 MG tablet TAKE 1 TABLET EVERY DAY 6/12/23  Yes Stephanie Sotelo MD   gabapentin (NEURONTIN) 100 MG capsule TAKE ONE CAPSULE BY MOUTH EVERY MORNING AND TAKE TWO CAPSULES BY MOUTH EVERY DAY AT BEDTIME 9/28/23  Yes Stephanie Sotelo MD   levothyroxine (SYNTHROID) 88 MCG tablet Take 1 tablet (88 mcg total) by mouth once daily. 9/28/23  Yes Stephanie Sotelo MD   metoprolol succinate (TOPROL-XL) 50 MG 24 hr tablet TAKE 1 TABLET EVERY DAY 3/28/23  Yes Stephanie Sotelo MD   multivitamin with minerals tablet Take 1 tablet by mouth once daily.   Yes Provider, Historical   rosuvastatin (CRESTOR) 20 MG tablet Take 1 tablet (20 mg total) by mouth once daily. 9/28/23  Yes Stephanie Sotelo MD   XARELTO 20 mg Tab Take 20 mg by mouth Daily. 9/19/23  Yes Provider, Historical   fluticasone propionate (FLONASE) 50 mcg/actuation nasal spray USE 2 SPRAY(S) IN EACH NOSTRIL ONCE DAILY FOR 10 DAYS 12/18/22   Provider, Historical       ALLERGIES   Adhesive, Adhesive tape-silicones, Dexlansoprazole, Hydrocodone-acetaminophen, Lovastatin, Meperidine, and Simvastatin  REVIEW OF SYSTEMS   Except as documented above, all other systems reviewed and negative    PHYSICAL EXAM     Vitals:    11/26/23 1333   BP:    Pulse: 68   Resp: 20   Temp:       General:  In no acute distress, resting comfortably  Head and neck:  Atraumatic, normocephalic, moist mucous membranes, supple neck  Chest:  wheezing   Heart:  S1, S2, no appreciable murmur  Abdomen:  Soft, nontender, BS +  MSK:  Warm, no lower extremity edema, no clubbing or cyanosis  Neuro:  Alert and oriented x4, moving all extremities with good strength  Integumentary:  No obvious skin rash  Psychiatry:  Appropriate  mood and affect  ASSESSMENT AND PLAN   RSV Bronchiolitis  -supplemental oxygen, prednisone, nebs    CLAY  -doesn't not have a functioning CPAP, perhaps case management can assist with ordering a new one    History of: HTN, AF (xarelto), bariatric surger    DVT prophylaxis:  xarelto for AF  Admit to observation status under my care   __________________________________________________________________  LABS/MICRO/MEDS/DIAGNOSTICS       LABS  Recent Labs     11/25/23 2206 11/26/23  0833   * 144   K 3.7 4.0   CHLORIDE 108* 108*   CO2 23 26   BUN 18.6 18.0   CREATININE 0.73 0.69   GLUCOSE 140* 185*   CALCIUM 9.4 8.7   ALKPHOS 114  --    AST 26  --    ALT 49  --    ALBUMIN 3.5  --      Recent Labs     11/26/23  0833   WBC 8.11   RBC 3.87*   HCT 35.1*   MCV 90.7          MICROBIOLOGY  Microbiology Results (last 7 days)       ** No results found for the last 168 hours. **            MEDICATIONS   albuterol-ipratropium  3 mL Nebulization Q6H    amLODIPine  5 mg Oral Daily    benzonatate  200 mg Oral TID    famotidine  20 mg Oral BID    furosemide  40 mg Oral Daily    gabapentin  100 mg Oral Daily    gabapentin  200 mg Oral QHS    levothyroxine  88 mcg Oral Daily    metoprolol succinate  50 mg Oral Daily    predniSONE  50 mg Oral Daily    rivaroxaban  20 mg Oral Daily      INFUSIONS      DIAGNOSTIC TESTS  X-Ray Chest AP Portable   ED Interpretation   No acute cardiopulmonary process      Final Result      No acute abnormality of the chest.         Electronically signed by: Columba Benson   Date:    11/26/2023   Time:    13:14             Patient information was obtained from patient, patient's family, past medical records and ER records.   All diagnosis and differential diagnosis have been reviewed; assessment and plan has been documented. I have personally reviewed the labs and test results that are presently available; I have reviewed the patients medication list. I have reviewed the consulting providers  response and recommendations. I have reviewed or attempted to review medical records based upon their availability.  All of the patient's questions have been addressed and answered. Patient's is agreeable to the above stated plan. I will continue to monitor closely and make adjustments to medical management as needed.  This note was created using [employer] voice recognition software that occasionally misinterpreted phrases or words.  Please contact me if any questions may rise regarding documentation to clarify verbiage.        Thairy G Reyes, DO   Internal Medicine  Department of Hospital Medicine  [facility] - [facility] Surgical Unit

## 2023-11-26 NOTE — PLAN OF CARE
Problem: Adult Inpatient Plan of Care  Goal: Plan of Care Review  Outcome: Ongoing, Progressing  Goal: Patient-Specific Goal (Individualized)  Outcome: Ongoing, Progressing  Goal: Absence of Hospital-Acquired Illness or Injury  Outcome: Ongoing, Progressing  Intervention: Prevent Skin Injury  Flowsheets (Taken 11/26/2023 0000)  Skin Protection:   adhesive use limited   incontinence pads utilized  Intervention: Prevent Infection  Flowsheets (Taken 11/26/2023 0000)  Infection Prevention:   cohorting utilized   environmental surveillance performed   hand hygiene promoted   single patient room provided   rest/sleep promoted  Goal: Optimal Comfort and Wellbeing  Outcome: Ongoing, Progressing  Intervention: Monitor Pain and Promote Comfort  Flowsheets (Taken 11/26/2023 0000)  Pain Management Interventions:   quiet environment facilitated   relaxation techniques promoted  Intervention: Provide Person-Centered Care  Flowsheets (Taken 11/26/2023 0000)  Trust Relationship/Rapport:   care explained   empathic listening provided   questions answered   questions encouraged   thoughts/feelings acknowledged  Goal: Readiness for Transition of Care  Outcome: Ongoing, Progressing     Problem: Bariatric Environmental Safety  Goal: Safety Maintained with Care  Outcome: Ongoing, Progressing     Problem: Diabetes Comorbidity  Goal: Blood Glucose Level Within Targeted Range  Outcome: Ongoing, Progressing  Intervention: Monitor and Manage Glycemia  Flowsheets (Taken 11/26/2023 0000)  Glycemic Management: blood glucose monitored

## 2023-11-26 NOTE — PLAN OF CARE
[facility]  Initial Discharge Assessment       Primary Care Provider: Stephanie Sotelo MD    Admission Diagnosis: Atrial fibrillation [I48.91]  Wheezing [R06.2]  Chest wall pain [R07.89]  Elevated blood pressure reading [R03.0]  Acute bronchitis due to respiratory syncytial virus (RSV) [J20.5]    Admission Date: 11/25/2023  Expected Discharge Date:     Transition of Care Barriers: None    Payor: HU[employer] MANAGED MEDICARE / Plan: HU[employer]NA MEDICARE HMO / Product Type: Capitation /     Extended Emergency Contact Information  Primary Emergency Contact: Ольга Chakraborty  Mobile Phone: [phone]  Relation: Daughter  Preferred language: English   needed? No    Discharge Plan A: Home with family, [facility]         [facility] - [facility] [address] [facility]  [address] [facility] [address]  Phone: [phone] Fax: [phone]    [facility], [facility]. - [place] [address] [facility]  [address] [address]  Phone: [phone] Fax: [phone]    [id number] at [address] [address]  Phone: [phone] Fax: [phone]      Initial Assessment (most recent)       Adult Discharge Assessment - 11/26/23 1554          Discharge Assessment    Assessment Type Discharge Planning Assessment     Confirmed/corrected address, phone number and insurance Yes     Confirmed Demographics Correct on Facesheet     Source of Information family     If unable to respond/provide information was family/caregiver contacted? Yes     Contact Name/Number Ольга Chakraborty daughter      Does patient/caregiver understand observation status Yes     Communicated STONE with patient/caregiver Date not available/Unable to determine     Reason For Admission RSV     People in Home alone     Facility Arrived  From: home     Do you expect to return to your current living situation? Yes     Do you have help at home or someone to help you manage your care at home? Yes     Who are your caregiver(s) and their phone number(s)? Ольга Chakraborty daughter      Prior to hospitilization cognitive status: Alert/Oriented     Current cognitive status: Alert/Oriented     Walking or Climbing Stairs ambulation difficulty, requires equipment     Mobility Management walker     Home Accessibility wheelchair accessible     Home Layout Able to live on 1st floor     Equipment Currently Used at Home bedside commode;walker, standard     Readmission within 30 days? No     Patient currently being followed by outpatient case management? No     Do you currently have service(s) that help you manage your care at home? No     Do you take prescription medications? Yes     Do you have prescription coverage? Yes     Coverage [employer]     Do you have any problems affording any of your prescribed medications? TBD     Is the patient taking medications as prescribed? yes     Who is going to help you get home at discharge? Ольга Chakraborty daughter      How do you get to doctors appointments? family or friend will provide     Are you on dialysis? No     Do you take coumadin? No     DME Needed Upon Discharge  nebulizer     Discharge Plan discussed with: Adult children     Transition of Care Barriers None     Discharge Plan A Home with family;Home Health        Physical Activity    On average, how many days per week do you engage in moderate to strenuous exercise (like a brisk walk)? 0 days     On average, how many minutes do you engage in exercise at this level? 0 min        Financial Resource Strain    How hard is it for you to pay for the very basics like food, housing, medical care, and heating? Not hard at all        Housing Stability    In the last 12 months, was there a time when you were not able to pay the mortgage or rent on  time? No     In the last 12 months, how many places have you lived? 1     In the last 12 months, was there a time when you did not have a steady place to sleep or slept in a shelter (including now)? No        Transportation Needs    In the past 12 months, has lack of transportation kept you from medical appointments or from getting medications? No     In the past 12 months, has lack of transportation kept you from meetings, work, or from getting things needed for daily living? No        Food Insecurity    Within the past 12 months, you worried that your food would run out before you got the money to buy more. Never true     Within the past 12 months, the food you bought just didn't last and you didn't have money to get more. Never true        Stress    Do you feel stress - tense, restless, nervous, or anxious, or unable to sleep at night because your mind is troubled all the time - these days? Only a little        Social Connections    In a typical week, how many times do you talk on the phone with family, friends, or neighbors? More than three times a week     How often do you get together with friends or relatives? More than three times a week     How often do you attend Gnosticist or Jewish services? 1 to 4 times per year     Do you belong to any clubs or organizations such as Gnosticist groups, unions, fraternal or athletic groups, or school groups? No     How often do you attend meetings of the clubs or organizations you belong to? 1 to 4 times per year     Are you , , , , never , or living with a partner?         Alcohol Use    Q1: How often do you have a drink containing alcohol? Never     Q2: How many drinks containing alcohol do you have on a typical day when you are drinking? Patient does not drink     Q3: How often do you have six or more drinks on one occasion? Never

## 2023-11-26 NOTE — ED PROVIDER NOTES
Encounter Date: 11/25/2023       History     Chief Complaint   Patient presents with    Cough     Pt dx with RSV yesterday but states cough is worse -c/o R rib pain      Patient was diagnosed with RSV yesterday but states her cough has gotten worse and she notes right lateral rib pain with coughing; no fever or chills; complains of wheezing; patient has a past medical history of paroxysmal atrial fibrillation, hypertension, HLD, CLAY, T2 DM, multinodular goiter, hypothyroidism    The history is provided by the patient.     Review of patient's allergies indicates:   Allergen Reactions    Adhesive      Other reaction(s): rash    Adhesive tape-silicones      Other reaction(s): rash    Dexlansoprazole      HA and did not work    Hydrocodone-acetaminophen      Other reaction(s): anxious, itching    Lovastatin     Meperidine      Other reaction(s): N/v    Simvastatin      Past Medical History:   Diagnosis Date    High cholesterol     Hypertension     Internal hemorrhoids 07/2023    Paroxysmal atrial fibrillation      Past Surgical History:   Procedure Laterality Date    CARPAL TUNNEL RELEASE      CHOLECYSTECTOMY      GASTRIC BYPASS      hysterectomy  1990    JOINT REPLACEMENT      TONSILLECTOMY       No family history on file.  Social History     Tobacco Use    Smoking status: Never    Smokeless tobacco: Never   Substance Use Topics    Alcohol use: Never     Review of Systems   Constitutional: Negative.    HENT: Negative.     Respiratory: Negative.     Cardiovascular: Negative.    Gastrointestinal:         GERD   Musculoskeletal:  Positive for arthralgias.   Skin: Negative.    Neurological: Negative.    All other systems reviewed and are negative.      Physical Exam     Initial Vitals [11/25/23 2057]   BP Pulse Resp Temp SpO2   (!) 156/84 74 (!) 24 97.3 °F (36.3 °C) 96 %      MAP       --         Physical Exam    Nursing note and vitals reviewed.  Constitutional: She appears well-developed and well-nourished.   HENT:    Head: Normocephalic and atraumatic.   Mouth/Throat: Oropharynx is clear and moist.   Eyes: EOM are normal. Pupils are equal, round, and reactive to light.   Neck: Neck supple.   Normal range of motion.  Cardiovascular:  Normal rate, regular rhythm, normal heart sounds and intact distal pulses.           Pulmonary/Chest: She has wheezes.   Abdominal: Abdomen is soft. She exhibits no distension. There is no abdominal tenderness. There is no rebound.   Musculoskeletal:         General: Normal range of motion.      Cervical back: Normal range of motion and neck supple.     Neurological: She is alert and oriented to person, place, and time. She has normal strength. GCS score is 15. GCS eye subscore is 4. GCS verbal subscore is 5. GCS motor subscore is 6.   Skin: Skin is warm and dry.         ED Course   Procedures  Labs Reviewed   COMPREHENSIVE METABOLIC PANEL - Abnormal; Notable for the following components:       Result Value    Sodium Level 146 (*)     Chloride 108 (*)     Glucose Level 140 (*)     All other components within normal limits   COVID/RSV/FLU A&B PCR - Abnormal; Notable for the following components:    Respiratory Syncytial Virus PCR Detected (*)     All other components within normal limits    Narrative:     The Xpert Xpress SARS-CoV-2/FLU/RSV plus is a rapid, multiplexed real-time PCR test intended for the simultaneous qualitative detection and differentiation of SARS-CoV-2, Influenza A, Influenza B, and respiratory syncytial virus (RSV) viral RNA in either nasopharyngeal swab or nasal swab specimens.         CBC WITH DIFFERENTIAL - Abnormal; Notable for the following components:    RBC 4.16 (*)     Hgb 10.9 (*)     MCH 26.2 (*)     MCHC 29.4 (*)     MPV 10.8 (*)     All other components within normal limits   CBC W/ AUTO DIFFERENTIAL    Narrative:     The following orders were created for panel order CBC auto differential.  Procedure                               Abnormality         Status                      ---------                               -----------         ------                     CBC with Differential[[id number]]       Abnormal            Final result                 Please view results for these tests on the individual orders.   HEMOGLOBIN A1C     EKG Readings: (Independently Interpreted)   Rhythm: Normal Sinus Rhythm. Heart Rate: 77. Ectopy: No Ectopy. Conduction: Normal. ST Segments: Normal ST Segments. T Waves: Normal. Clinical Impression: Normal Sinus Rhythm       Imaging Results              X-Ray Chest AP Portable (Preliminary result)  Result time 11/25/23 21:25:00      Wet Read by Cristian Rush MD (11/25/23 21:25:00, [facility] - Emergency Dept, Emergency Medicine)    No acute cardiopulmonary process                                     Medications   sodium chloride 0.9% flush 2 mL (has no administration in time range)   melatonin tablet 6 mg (has no administration in time range)   acetaminophen tablet 650 mg (has no administration in time range)   traMADoL tablet 50 mg (has no administration in time range)   famotidine tablet 20 mg (has no administration in time range)   ondansetron disintegrating tablet 8 mg (has no administration in time range)   acetaminophen tablet 650 mg (has no administration in time range)   albuterol-ipratropium 2.5 mg-0.5 mg/3 mL nebulizer solution 3 mL (has no administration in time range)   dextromethorphan-guaiFENesin  mg/5 ml liquid 10 mL (has no administration in time range)   glucose chewable tablet 16 g (has no administration in time range)   glucose chewable tablet 24 g (has no administration in time range)   glucagon (human recombinant) injection 1 mg (has no administration in time range)   dextrose 10% bolus 125 mL 125 mL (has no administration in time range)   dextrose 10% bolus 250 mL 250 mL (has no administration in time range)   cloNIDine tablet 0.1 mg (has no administration in time range)   albuterol-ipratropium 2.5 mg-0.5 mg/3 mL  nebulizer solution 3 mL (3 mLs Nebulization Given 11/25/23 2111)   dexAMETHasone injection 6 mg (6 mg Intravenous Given 11/25/23 2148)     Medical Decision Making  Patient was diagnosed with RSV yesterday but states her cough has gotten worse and she notes right lateral rib pain with coughing; no fever or chills; complains of wheezing; patient has a past medical history of paroxysmal atrial fibrillation, hypertension, HLD, CLAY, T2 DM, multinodular goiter, hypothyroidism    DIFFERENTIAL DIAGNOSIS- RSV bronchitis, reactive airways disease, wheezing, pleural effusion, pneumonia    Amount and/or Complexity of Data Reviewed  External Data Reviewed: labs, radiology and notes.  Labs: ordered. Decision-making details documented in ED Course.  Radiology: ordered and independent interpretation performed. Decision-making details documented in ED Course.  ECG/medicine tests: ordered and independent interpretation performed. Decision-making details documented in ED Course.  Discussion of management or test interpretation with external provider(s): I reviewed patient's case with hospitalist Dr. Reyes; I reviewed HPI, past medical history, physical exam, lab findings, x-ray findings, treatment and response to treatment; patient will be admitted for DuoNeb treatment and steroids    Risk  OTC drugs.  Prescription drug management.  Decision regarding hospitalization.  Risk Details: Patient continued to have wheezing and right-sided chest wall pain with coughing; orders were placed for observation               ED Course as of 11/25/23 2335   Sat Nov 25, 2023   2253 RSV Ag by Molecular Method(!): Detected [DD]      ED Course User Index  [DD] Cristian Rush MD          Patient Vitals for the past 24 hrs:   BP Temp Temp src Pulse Resp SpO2 Height Weight   11/25/23 2247 (!) 164/104 -- -- 70 13 (!) 94 % -- --   11/25/23 2232 (!) 163/96 -- -- 70 17 (!) 94 % -- --   11/25/23 2217 (!) 179/88 -- -- 69 17 95 % -- --   11/25/23 2132 (!)  "142/100 -- -- 75 -- 96 % -- --   11/25/23 2117 (!) 147/78 -- -- 72 -- 99 % -- --   11/25/23 2111 -- -- -- 74 (!) 22 96 % -- --   11/25/23 2057 (!) 156/84 97.3 °F (36.3 °C) Oral 74 (!) 24 96 % 5' 4" (1.626 m) 113.4 kg (250 lb)                   Clinical Impression:  Final diagnoses:  [I48.91] Atrial fibrillation  [J20.5] Acute bronchitis due to respiratory syncytial virus (RSV) (Primary)  [R06.2] Wheezing  [R07.89] Chest wall pain  [R03.0] Elevated blood pressure reading          ED Disposition Condition    Observation Stable                Cristian Rush MD  11/25/23 7564    "

## 2023-11-26 NOTE — NURSING
Nurses Note -- 4 Eyes      11/26/2023   6:41 AM      Skin assessed during: Admit      [x] No Altered Skin Integrity Present    []Prevention Measures Documented      [] Yes- Altered Skin Integrity Present or Discovered   [] LDA Added if Not in Epic (Describe Wound)   [] New Altered Skin Integrity was Present on Admit and Documented in LDA   [] Wound Image Taken    Wound Care Consulted? No    Attending Nurse:  Lorene Chapa LPN  Second RN/Staff Member:  ELTON Sarkar RN

## 2023-11-27 LAB
ANION GAP SERPL CALC-SCNC: 12 MEQ/L
BASOPHILS # BLD AUTO: 0.01 X10(3)/MCL
BASOPHILS NFR BLD AUTO: 0.1 %
BUN SERPL-MCNC: 19.2 MG/DL (ref 9.8–20.1)
CALCIUM SERPL-MCNC: 8.8 MG/DL (ref 8.4–10.2)
CHLORIDE SERPL-SCNC: 107 MMOL/L (ref 98–107)
CO2 SERPL-SCNC: 25 MMOL/L (ref 23–31)
CREAT SERPL-MCNC: 0.69 MG/DL (ref 0.55–1.02)
CREAT/UREA NIT SERPL: 28
EOSINOPHIL # BLD AUTO: 0 X10(3)/MCL (ref 0–0.9)
EOSINOPHIL NFR BLD AUTO: 0 %
ERYTHROCYTE [DISTWIDTH] IN BLOOD BY AUTOMATED COUNT: 16 % (ref 11.5–17)
GFR SERPLBLD CREATININE-BSD FMLA CKD-EPI: >60 MLS/MIN/1.73/M2
GLUCOSE SERPL-MCNC: 142 MG/DL (ref 82–115)
HCT VFR BLD AUTO: 36.5 % (ref 37–47)
HGB BLD-MCNC: 10.9 G/DL (ref 12–16)
IMM GRANULOCYTES # BLD AUTO: 0.04 X10(3)/MCL (ref 0–0.04)
IMM GRANULOCYTES NFR BLD AUTO: 0.4 %
LYMPHOCYTES # BLD AUTO: 1.77 X10(3)/MCL (ref 0.6–4.6)
LYMPHOCYTES NFR BLD AUTO: 19.6 %
MCH RBC QN AUTO: 26.7 PG (ref 27–31)
MCHC RBC AUTO-ENTMCNC: 29.9 G/DL (ref 33–36)
MCV RBC AUTO: 89.2 FL (ref 80–94)
MONOCYTES # BLD AUTO: 0.33 X10(3)/MCL (ref 0.1–1.3)
MONOCYTES NFR BLD AUTO: 3.6 %
NEUTROPHILS # BLD AUTO: 6.9 X10(3)/MCL (ref 2.1–9.2)
NEUTROPHILS NFR BLD AUTO: 76.3 %
PLATELET # BLD AUTO: 244 X10(3)/MCL (ref 130–400)
PMV BLD AUTO: 10.9 FL (ref 7.4–10.4)
POCT GLUCOSE: 109 MG/DL (ref 70–110)
POTASSIUM SERPL-SCNC: 3.8 MMOL/L (ref 3.5–5.1)
RBC # BLD AUTO: 4.09 X10(6)/MCL (ref 4.2–5.4)
SODIUM SERPL-SCNC: 144 MMOL/L (ref 136–145)
WBC # SPEC AUTO: 9.05 X10(3)/MCL (ref 4.5–11.5)

## 2023-11-27 PROCEDURE — 80048 BASIC METABOLIC PNL TOTAL CA: CPT | Performed by: STUDENT IN AN ORGANIZED HEALTH CARE EDUCATION/TRAINING PROGRAM

## 2023-11-27 PROCEDURE — 94760 N-INVAS EAR/PLS OXIMETRY 1: CPT

## 2023-11-27 PROCEDURE — 94640 AIRWAY INHALATION TREATMENT: CPT | Mod: XB

## 2023-11-27 PROCEDURE — 25000003 PHARM REV CODE 250: Performed by: SPECIALIST

## 2023-11-27 PROCEDURE — 25000003 PHARM REV CODE 250: Performed by: NURSE PRACTITIONER

## 2023-11-27 PROCEDURE — 85025 COMPLETE CBC W/AUTO DIFF WBC: CPT | Performed by: STUDENT IN AN ORGANIZED HEALTH CARE EDUCATION/TRAINING PROGRAM

## 2023-11-27 PROCEDURE — 96375 TX/PRO/DX INJ NEW DRUG ADDON: CPT

## 2023-11-27 PROCEDURE — 96374 THER/PROPH/DIAG INJ IV PUSH: CPT | Mod: 59

## 2023-11-27 PROCEDURE — 94660 CPAP INITIATION&MGMT: CPT

## 2023-11-27 PROCEDURE — 63600175 PHARM REV CODE 636 W HCPCS: Performed by: STUDENT IN AN ORGANIZED HEALTH CARE EDUCATION/TRAINING PROGRAM

## 2023-11-27 PROCEDURE — G0378 HOSPITAL OBSERVATION PER HR: HCPCS

## 2023-11-27 PROCEDURE — 25000242 PHARM REV CODE 250 ALT 637 W/ HCPCS: Performed by: INTERNAL MEDICINE

## 2023-11-27 PROCEDURE — 99900035 HC TECH TIME PER 15 MIN (STAT)

## 2023-11-27 PROCEDURE — 25000003 PHARM REV CODE 250: Performed by: INTERNAL MEDICINE

## 2023-11-27 PROCEDURE — 25000003 PHARM REV CODE 250: Performed by: STUDENT IN AN ORGANIZED HEALTH CARE EDUCATION/TRAINING PROGRAM

## 2023-11-27 PROCEDURE — 96376 TX/PRO/DX INJ SAME DRUG ADON: CPT

## 2023-11-27 RX ORDER — METOPROLOL SUCCINATE 50 MG/1
50 TABLET, EXTENDED RELEASE ORAL ONCE
Status: CANCELLED | OUTPATIENT
Start: 2023-11-27

## 2023-11-27 RX ORDER — DILTIAZEM HYDROCHLORIDE 5 MG/ML
15 INJECTION INTRAVENOUS ONCE
Status: COMPLETED | OUTPATIENT
Start: 2023-11-27 | End: 2023-11-27

## 2023-11-27 RX ORDER — METOPROLOL TARTRATE 1 MG/ML
5 INJECTION, SOLUTION INTRAVENOUS EVERY 5 MIN PRN
Status: COMPLETED | OUTPATIENT
Start: 2023-11-27 | End: 2023-11-27

## 2023-11-27 RX ORDER — METOPROLOL TARTRATE 1 MG/ML
5 INJECTION, SOLUTION INTRAVENOUS ONCE
Status: COMPLETED | OUTPATIENT
Start: 2023-11-27 | End: 2023-11-27

## 2023-11-27 RX ORDER — DILTIAZEM HYDROCHLORIDE 5 MG/ML
10 INJECTION INTRAVENOUS ONCE
Status: COMPLETED | OUTPATIENT
Start: 2023-11-27 | End: 2023-11-27

## 2023-11-27 RX ORDER — LEVALBUTEROL INHALATION SOLUTION 1.25 MG/3ML
1.25 SOLUTION RESPIRATORY (INHALATION) 3 TIMES DAILY
Status: DISCONTINUED | OUTPATIENT
Start: 2023-11-27 | End: 2023-12-03 | Stop reason: HOSPADM

## 2023-11-27 RX ORDER — METOPROLOL SUCCINATE 50 MG/1
50 TABLET, EXTENDED RELEASE ORAL ONCE
Status: COMPLETED | OUTPATIENT
Start: 2023-11-27 | End: 2023-11-27

## 2023-11-27 RX ADMIN — THIAMINE HCL TAB 100 MG 100 MG: 100 TAB at 10:11

## 2023-11-27 RX ADMIN — MELATONIN TAB 3 MG 9 MG: 3 TAB at 11:11

## 2023-11-27 RX ADMIN — DILTIAZEM HYDROCHLORIDE 10 MG: 5 INJECTION INTRAVENOUS at 04:11

## 2023-11-27 RX ADMIN — METOPROLOL TARTRATE 5 MG: 1 INJECTION, SOLUTION INTRAVENOUS at 09:11

## 2023-11-27 RX ADMIN — FAMOTIDINE 20 MG: 20 TABLET ORAL at 09:11

## 2023-11-27 RX ADMIN — METOPROLOL TARTRATE 5 MG: 1 INJECTION, SOLUTION INTRAVENOUS at 01:11

## 2023-11-27 RX ADMIN — LEVALBUTEROL HYDROCHLORIDE 1.25 MG: 1.25 SOLUTION RESPIRATORY (INHALATION) at 01:11

## 2023-11-27 RX ADMIN — GUAIFENESIN AND DEXTROMETHORPHAN 10 ML: 100; 10 SYRUP ORAL at 01:11

## 2023-11-27 RX ADMIN — DILTIAZEM HYDROCHLORIDE 15 MG: 5 INJECTION INTRAVENOUS at 06:11

## 2023-11-27 RX ADMIN — FUROSEMIDE 40 MG: 40 TABLET ORAL at 09:11

## 2023-11-27 RX ADMIN — LEVALBUTEROL HYDROCHLORIDE 1.25 MG: 1.25 SOLUTION RESPIRATORY (INHALATION) at 09:11

## 2023-11-27 RX ADMIN — MULTIPLE VITAMINS W/ MINERALS TAB 1 TABLET: TAB at 09:11

## 2023-11-27 RX ADMIN — BENZONATATE 200 MG: 100 CAPSULE ORAL at 09:11

## 2023-11-27 RX ADMIN — GUAIFENESIN AND DEXTROMETHORPHAN 10 ML: 100; 10 SYRUP ORAL at 08:11

## 2023-11-27 RX ADMIN — TRAMADOL HYDROCHLORIDE 50 MG: 50 TABLET, COATED ORAL at 01:11

## 2023-11-27 RX ADMIN — METOPROLOL SUCCINATE 50 MG: 50 TABLET, FILM COATED, EXTENDED RELEASE ORAL at 09:11

## 2023-11-27 RX ADMIN — BENZONATATE 200 MG: 100 CAPSULE ORAL at 03:11

## 2023-11-27 RX ADMIN — AMLODIPINE BESYLATE 5 MG: 5 TABLET ORAL at 09:11

## 2023-11-27 RX ADMIN — METOPROLOL SUCCINATE 50 MG: 50 TABLET, EXTENDED RELEASE ORAL at 05:11

## 2023-11-27 RX ADMIN — ACETAMINOPHEN 650 MG: 325 TABLET ORAL at 10:11

## 2023-11-27 RX ADMIN — METOPROLOL TARTRATE 5 MG: 1 INJECTION, SOLUTION INTRAVENOUS at 03:11

## 2023-11-27 RX ADMIN — GABAPENTIN 200 MG: 100 CAPSULE ORAL at 09:11

## 2023-11-27 RX ADMIN — PREDNISONE 50 MG: 20 TABLET ORAL at 09:11

## 2023-11-27 RX ADMIN — LEVOTHYROXINE SODIUM 88 MCG: 88 TABLET ORAL at 09:11

## 2023-11-27 RX ADMIN — GUAIFENESIN AND DEXTROMETHORPHAN 10 ML: 100; 10 SYRUP ORAL at 10:11

## 2023-11-27 RX ADMIN — GUAIFENESIN AND DEXTROMETHORPHAN 10 ML: 100; 10 SYRUP ORAL at 03:11

## 2023-11-27 RX ADMIN — METOPROLOL TARTRATE 5 MG: 1 INJECTION, SOLUTION INTRAVENOUS at 10:11

## 2023-11-27 RX ADMIN — GABAPENTIN 100 MG: 100 CAPSULE ORAL at 09:11

## 2023-11-27 RX ADMIN — RIVAROXABAN 20 MG: 20 TABLET, FILM COATED ORAL at 04:11

## 2023-11-27 NOTE — CONSULTS
Inpatient consult to Cardiology  Consult performed by: Laura Wong FNP  Consult ordered by: Syd Oleary MD  Reason for consult: Afib RVR      [facility]    Cardiology  Consult Note    Patient Name: Vesta Lala  MRN: [id number]  Admission Date: 11/25/2023  Hospital Length of Stay: 0 days  Code Status: Full Code   Attending Provider: Reyes, Thairy G, DO   Consulting Provider: LORRAINE Britton  Primary Care Physician: Stephanie Sotelo MD  Principal Problem:Acute bronchitis due to respiratory syncytial virus (RSV)    Patient information was obtained from patient, past medical records, ER records, and primary team.     Subjective:     Chief Complaint:  Reason for consult: Afib RVR     HPI: Patient is a 78 year old WF known to [facility], Dr. Jama with PMHx: CAD/nonobstructive (mid LAD 30%), PAF post gastric sleeve-2014 and is on Xarelto 20 mg daily, HTN, HLD, hypothyroid, spinal stenosis, CLAY (non functioning CPAP >6 months). Patient presents to ED with c/o of  worsening cough, wheezing. She has recent dx of RSV and treatment with steroid shot.  Today patient had c/o of palpitations and was noted to be in Afib with RVR.  Metoprolol 5 mg IV has been given at 1059 and 1302 with liable HR in 110-120s with some borderline hypotension.  She remains on metoprolol succinate 50 mg daily.  BP has been stable, currently with -130,  [facility] is consulted for Afib RVR    PMH: CAD, PAF, HTN, HLD, hypothyroid, spinal stenosis, CLAY  PSH: [facility] (10.30.2012), carpal tunnel release, cholecystectomy, Gastric sleeve, hysterectomy, joint replacement, tonsillectomy.  Family History: Non-contributory  Social History: Denies tobacco, ETOH, illicit drug use    Previous Cardiac Diagnostics:   Echocardiogram 4.19.2021:  The study quality is below average.   The left ventricle is normal in size. Global left ventricular systolic function is normal. The left ventricular ejection fraction is 65%.    Mild to moderate (1-2+) aortic regurgitation. Mild (1+) mitral regurgitation. Mild (1+) tricuspid regurgitation.  The pulmonary artery systolic pressure is 29 mmHg.     Carotid US 4.19.2021:  The study quality is average.   1-39% stenosis in the proximal right internal carotid artery based on Bluth Criteria.   1-39% stenosis in the proximal left internal carotid artery based on Bluth Criteria.   Antegrade right vertebral artery flow.   Antegrade left vertebral artery flow.     PET 11.3.2017:  Normal perfusion study, no perfusion defect.    No evidence of ischemia    [facility] 10.30.2012:  Left main is normal giving rise to left anterior descending artery and left circumflex artery  Left anterior descending artery.  The patient does have diffuse 30-40% disease  in the mid to distal vessel, smaller caliber distal vessel; however, no obstructive lesion.  Diagonal was a small to moderate sized branch with a 40% mid vessel stenosis  Left circumflex artery gives rise to two large obtuse marginal branches with proximal 30% stenosis of second obtuse marginal.  Right coronary artery is a large right dominant vessel without evidence of disease.  Left ventriculogram revealed left ventricular function, ejection fraction 55%        Review of patient's allergies indicates:   Allergen Reactions    Adhesive      Other reaction(s): rash    Adhesive tape-silicones      Other reaction(s): rash    Dexlansoprazole      HA and did not work    Hydrocodone-acetaminophen      Other reaction(s): anxious, itching    Lovastatin     Meperidine      Other reaction(s): N/v    Simvastatin        No current facility-administered medications on file prior to encounter.     Current Outpatient Medications on File Prior to Encounter   Medication Sig    amLODIPine (NORVASC) 5 MG tablet TAKE 1 TABLET EVERY DAY    docusate sodium (COLACE) 100 MG capsule Take 100 mg by mouth 2 (two) times daily as needed for Constipation.    ergocalciferol (ERGOCALCIFEROL)  50,000 unit Cap Take 50,000 Units by mouth every 7 days.    furosemide (LASIX) 40 MG tablet TAKE 1 TABLET EVERY DAY    gabapentin (NEURONTIN) 100 MG capsule TAKE ONE CAPSULE BY MOUTH EVERY MORNING AND TAKE TWO CAPSULES BY MOUTH EVERY DAY AT BEDTIME    levothyroxine (SYNTHROID) 88 MCG tablet Take 1 tablet (88 mcg total) by mouth once daily.    metoprolol succinate (TOPROL-XL) 50 MG 24 hr tablet TAKE 1 TABLET EVERY DAY    multivitamin with minerals tablet Take 1 tablet by mouth once daily.    rosuvastatin (CRESTOR) 20 MG tablet Take 1 tablet (20 mg total) by mouth once daily.    XARELTO 20 mg Tab Take 20 mg by mouth Daily.    fluticasone propionate (FLONASE) 50 mcg/actuation nasal spray USE 2 SPRAY(S) IN EACH NOSTRIL ONCE DAILY FOR 10 DAYS       Review of Systems   Respiratory:  Positive for cough.    All other systems reviewed and are negative.      Objective:     Vital Signs (Most Recent):  Temp: 97.8 °F (36.6 °C) (11/27/23 1059)  Pulse: (!) 124 (11/27/23 1307)  Resp: 20 (11/27/23 1307)  BP: 123/83 (11/27/23 1301)  SpO2: (!) 93 % (11/27/23 1307) Vital Signs (24h Range):  Temp:  [97.6 °F (36.4 °C)-98.7 °F (37.1 °C)] 97.8 °F (36.6 °C)  Pulse:  [] 124  Resp:  [18-22] 20  SpO2:  [93 %-99 %] 93 %  BP: (107-159)/(72-88) 123/83     Weight: 121.8 kg (268 lb 8.3 oz)  Body mass index is 47.57 kg/m².    SpO2: (!) 93 %         Intake/Output Summary (Last 24 hours) at 11/27/2023 1603  Last data filed at 11/27/2023 1349  Gross per 24 hour   Intake 600 ml   Output --   Net 600 ml       Lines/Drains/Airways       Peripheral Intravenous Line  Duration                  Peripheral IV - Single Lumen 20 G Right Hand -- days                    Significant Labs:  Recent Results (from the past 72 hour(s))   COVID/RSV/FLU A&B PCR    Collection Time: 11/25/23  9:34 PM   Result Value Ref Range    Influenza A PCR Not Detected Not Detected    Influenza B PCR Not Detected Not Detected    Respiratory Syncytial Virus PCR Detected (A) Not  Detected    SARS-CoV-2 PCR Not Detected Not Detected, Negative, Invalid   Comprehensive metabolic panel    Collection Time: 11/25/23 10:06 PM   Result Value Ref Range    Sodium Level 146 (H) 136 - 145 mmol/L    Potassium Level 3.7 3.5 - 5.1 mmol/L    Chloride 108 (H) 98 - 107 mmol/L    Carbon Dioxide 23 23 - 31 mmol/L    Glucose Level 140 (H) 82 - 115 mg/dL    Blood Urea Nitrogen 18.6 9.8 - 20.1 mg/dL    Creatinine 0.73 0.55 - 1.02 mg/dL    Calcium Level Total 9.4 8.4 - 10.2 mg/dL    Protein Total 6.8 5.8 - 7.6 gm/dL    Albumin Level 3.5 3.4 - 4.8 g/dL    Globulin 3.3 2.4 - 3.5 gm/dL    Albumin/Globulin Ratio 1.1 1.1 - 2.0 ratio    Bilirubin Total 0.2 <=1.5 mg/dL    Alkaline Phosphatase 114 40 - 150 unit/L    Alanine Aminotransferase 49 0 - 55 unit/L    Aspartate Aminotransferase 26 5 - 34 unit/L    eGFR >60 mls/min/1.73/m2   CBC with Differential    Collection Time: 11/25/23 10:06 PM   Result Value Ref Range    WBC 8.39 4.50 - 11.50 x10(3)/mcL    RBC 4.16 (L) 4.20 - 5.40 x10(6)/mcL    Hgb 10.9 (L) 12.0 - 16.0 g/dL    Hct 37.1 37.0 - 47.0 %    MCV 89.2 80.0 - 94.0 fL    MCH 26.2 (L) 27.0 - 31.0 pg    MCHC 29.4 (L) 33.0 - 36.0 g/dL    RDW 16.2 11.5 - 17.0 %    Platelet 238 130 - 400 x10(3)/mcL    MPV 10.8 (H) 7.4 - 10.4 fL    Neut % 69.2 %    Lymph % 25.0 %    Mono % 5.4 %    Eos % 0.0 %    Basophil % 0.2 %    Lymph # 2.10 0.6 - 4.6 x10(3)/mcL    Neut # 5.80 2.1 - 9.2 x10(3)/mcL    Mono # 0.45 0.1 - 1.3 x10(3)/mcL    Eos # 0.00 0 - 0.9 x10(3)/mcL    Baso # 0.02 <=0.2 x10(3)/mcL    IG# 0.02 0 - 0.04 x10(3)/mcL    IG% 0.2 %   Hemoglobin A1c if not done in past 3 months    Collection Time: 11/25/23 10:06 PM   Result Value Ref Range    Hemoglobin A1c 6.3 <=7.0 %    Estimated Average Glucose 134.1 mg/dL   POCT glucose    Collection Time: 11/26/23  5:58 AM   Result Value Ref Range    POCT Glucose 142 (H) 70 - 110 mg/dL   Basic Metabolic Panel    Collection Time: 11/26/23  8:33 AM   Result Value Ref Range    Sodium Level  144 136 - 145 mmol/L    Potassium Level 4.0 3.5 - 5.1 mmol/L    Chloride 108 (H) 98 - 107 mmol/L    Carbon Dioxide 26 23 - 31 mmol/L    Glucose Level 185 (H) 82 - 115 mg/dL    Blood Urea Nitrogen 18.0 9.8 - 20.1 mg/dL    Creatinine 0.69 0.55 - 1.02 mg/dL    BUN/Creatinine Ratio 26     Calcium Level Total 8.7 8.4 - 10.2 mg/dL    Anion Gap 10.0 mEq/L    eGFR >60 mls/min/1.73/m2   Magnesium    Collection Time: 11/26/23  8:33 AM   Result Value Ref Range    Magnesium Level 1.90 1.60 - 2.60 mg/dL   CBC with Differential    Collection Time: 11/26/23  8:33 AM   Result Value Ref Range    WBC 8.11 4.50 - 11.50 x10(3)/mcL    RBC 3.87 (L) 4.20 - 5.40 x10(6)/mcL    Hgb 10.2 (L) 12.0 - 16.0 g/dL    Hct 35.1 (L) 37.0 - 47.0 %    MCV 90.7 80.0 - 94.0 fL    MCH 26.4 (L) 27.0 - 31.0 pg    MCHC 29.1 (L) 33.0 - 36.0 g/dL    RDW 16.4 11.5 - 17.0 %    Platelet 199 130 - 400 x10(3)/mcL    MPV 10.9 (H) 7.4 - 10.4 fL    Neut % 81.4 %    Lymph % 17.3 %    Mono % 1.1 %    Eos % 0.0 %    Basophil % 0.0 %    Lymph # 1.40 0.6 - 4.6 x10(3)/mcL    Neut # 6.60 2.1 - 9.2 x10(3)/mcL    Mono # 0.09 (L) 0.1 - 1.3 x10(3)/mcL    Eos # 0.00 0 - 0.9 x10(3)/mcL    Baso # 0.00 <=0.2 x10(3)/mcL    IG# 0.02 0 - 0.04 x10(3)/mcL    IG% 0.2 %   POCT glucose    Collection Time: 11/26/23 11:42 AM   Result Value Ref Range    POCT Glucose 126 (H) 70 - 110 mg/dL   Basic Metabolic Panel    Collection Time: 11/27/23  3:38 AM   Result Value Ref Range    Sodium Level 144 136 - 145 mmol/L    Potassium Level 3.8 3.5 - 5.1 mmol/L    Chloride 107 98 - 107 mmol/L    Carbon Dioxide 25 23 - 31 mmol/L    Glucose Level 142 (H) 82 - 115 mg/dL    Blood Urea Nitrogen 19.2 9.8 - 20.1 mg/dL    Creatinine 0.69 0.55 - 1.02 mg/dL    BUN/Creatinine Ratio 28     Calcium Level Total 8.8 8.4 - 10.2 mg/dL    Anion Gap 12.0 mEq/L    eGFR >60 mls/min/1.73/m2   CBC with Differential    Collection Time: 11/27/23  3:38 AM   Result Value Ref Range    WBC 9.05 4.50 - 11.50 x10(3)/mcL    RBC 4.09 (L)  4.20 - 5.40 x10(6)/mcL    Hgb 10.9 (L) 12.0 - 16.0 g/dL    Hct 36.5 (L) 37.0 - 47.0 %    MCV 89.2 80.0 - 94.0 fL    MCH 26.7 (L) 27.0 - 31.0 pg    MCHC 29.9 (L) 33.0 - 36.0 g/dL    RDW 16.0 11.5 - 17.0 %    Platelet 244 130 - 400 x10(3)/mcL    MPV 10.9 (H) 7.4 - 10.4 fL    Neut % 76.3 %    Lymph % 19.6 %    Mono % 3.6 %    Eos % 0.0 %    Basophil % 0.1 %    Lymph # 1.77 0.6 - 4.6 x10(3)/mcL    Neut # 6.90 2.1 - 9.2 x10(3)/mcL    Mono # 0.33 0.1 - 1.3 x10(3)/mcL    Eos # 0.00 0 - 0.9 x10(3)/mcL    Baso # 0.01 <=0.2 x10(3)/mcL    IG# 0.04 0 - 0.04 x10(3)/mcL    IG% 0.4 %   POCT glucose    Collection Time: 11/27/23  5:16 AM   Result Value Ref Range    POCT Glucose 109 70 - 110 mg/dL       Significant Imaging:  Imaging Results              X-Ray Chest AP Portable (Final result)  Result time 11/26/23 13:14:11      Final result by Columba Benson MD (11/26/23 13:14:11)                   Impression:      No acute abnormality of the chest.      Electronically signed by: Columba Benson  Date:    11/26/2023  Time:    13:14               Narrative:    EXAMINATION:  XR CHEST AP PORTABLE    CLINICAL HISTORY:  Cough;    COMPARISON:  Chest x-ray dated 05/08/2023    FINDINGS:  The heart is stable in size.  There is no focal airspace consolidation.  There is no pleural effusion or visible pneumothorax.                        Wet Read by Cristian Rush MD (11/25/23 21:25:00, [facility], Emergency Medicine)    No acute cardiopulmonary process                                    EKG:      Results for orders placed or performed during the hospital encounter of 11/25/23   EKG 12-lead    Narrative    Test Reason : I48.91,    Vent. Rate : 069 BPM     Atrial Rate : 069 BPM     P-R Int : 160 ms          QRS Dur : 088 ms      QT Int : 432 ms       P-R-T Axes : 061 010 061 degrees     QTc Int : 462 ms    Normal sinus rhythm  Normal ECG  When compared with ECG of 08-MAY-2023 16:43,  No significant change was  found    Referred By: ALEC   SELF           Confirmed By:        Telemetry:  Afib RVR    Physical Exam  Vitals reviewed.   Constitutional:       Appearance: Normal appearance. She is obese.   HENT:      Head: Normocephalic and atraumatic.      Mouth/Throat:      Mouth: Mucous membranes are moist.      Pharynx: Oropharynx is clear.   Eyes:      Conjunctiva/sclera: Conjunctivae normal.      Pupils: Pupils are equal, round, and reactive to light.   Cardiovascular:      Rate and Rhythm: Tachycardia present. Rhythm irregularly irregular.      Comments: Lower extremities warm to touch  Pulmonary:      Effort: Pulmonary effort is normal.      Breath sounds: Examination of the right-upper field reveals wheezing. Examination of the left-upper field reveals wheezing. Examination of the right-lower field reveals wheezing. Examination of the left-lower field reveals wheezing. Wheezing present.      Comments: SpO2 93% on RA  Musculoskeletal:         General: Normal range of motion.      Cervical back: Normal range of motion and neck supple.      Right lower leg: No edema.      Left lower leg: No edema.   Skin:     General: Skin is warm and dry.   Neurological:      General: No focal deficit present.      Mental Status: She is alert and oriented to person, place, and time.   Psychiatric:         Mood and Affect: Mood normal.         Behavior: Behavior normal.         Thought Content: Thought content normal.         Judgment: Judgment normal.         Home Medications:   No current facility-administered medications on file prior to encounter.     Current Outpatient Medications on File Prior to Encounter   Medication Sig Dispense Refill    amLODIPine (NORVASC) 5 MG tablet TAKE 1 TABLET EVERY DAY 90 tablet 3    docusate sodium (COLACE) 100 MG capsule Take 100 mg by mouth 2 (two) times daily as needed for Constipation.      ergocalciferol (ERGOCALCIFEROL) 50,000 unit Cap Take 50,000 Units by mouth every 7 days.      furosemide  (LASIX) 40 MG tablet TAKE 1 TABLET EVERY DAY 90 tablet 3    gabapentin (NEURONTIN) 100 MG capsule TAKE ONE CAPSULE BY MOUTH EVERY MORNING AND TAKE TWO CAPSULES BY MOUTH EVERY DAY AT BEDTIME 270 capsule 3    levothyroxine (SYNTHROID) 88 MCG tablet Take 1 tablet (88 mcg total) by mouth once daily. 90 tablet 1    metoprolol succinate (TOPROL-XL) 50 MG 24 hr tablet TAKE 1 TABLET EVERY DAY 90 tablet 3    multivitamin with minerals tablet Take 1 tablet by mouth once daily.      rosuvastatin (CRESTOR) 20 MG tablet Take 1 tablet (20 mg total) by mouth once daily. 90 tablet 1    XARELTO 20 mg Tab Take 20 mg by mouth Daily.      fluticasone propionate (FLONASE) 50 mcg/actuation nasal spray USE 2 SPRAY(S) IN EACH NOSTRIL ONCE DAILY FOR 10 DAYS         Current Inpatient Medications:    Current Facility-Administered Medications:     acetaminophen tablet 650 mg, 650 mg, Oral, Q4H PRN, Reyes, Thairy G, DO    albuterol-ipratropium 2.5 mg-0.5 mg/3 mL nebulizer solution 3 mL, 3 mL, Nebulization, Q4H PRN, Reyes, Thairy G, DO    aluminum-magnesium hydroxide-simethicone 200-200-20 mg/5 mL suspension 30 mL, 30 mL, Oral, QID PRN, Reyes, Thairy G, DO    amLODIPine tablet 5 mg, 5 mg, Oral, Daily, Reyes, Thairy G, DO, 5 mg at 11/27/23 0942    benzonatate capsule 200 mg, 200 mg, Oral, TID, Reyes, Thairy G, DO, 200 mg at 11/27/23 1525    bisacodyL suppository 10 mg, 10 mg, Rectal, Daily PRN, Reyes, Thairy G, DO    cloNIDine tablet 0.1 mg, 0.1 mg, Oral, Q4H PRN, Cristian Rush MD, 0.1 mg at 11/25/23 8165    dextromethorphan-guaiFENesin  mg/5 ml liquid 10 mL, 10 mL, Oral, Q4H PRN, Cristian Rush MD, 10 mL at 11/27/23 1525    dextrose 10% bolus 125 mL 125 mL, 12.5 g, Intravenous, PRN, Cristian Rush MD    dextrose 10% bolus 250 mL 250 mL, 25 g, Intravenous, PRN, Cristian Rush MD    famotidine tablet 20 mg, 20 mg, Oral, BID, Cristian Rush MD, 20 mg at 11/27/23 0943    furosemide tablet 40 mg, 40 mg, Oral, Daily, Reyes, Thairy G,  DO, 40 mg at 11/27/23 0942    gabapentin capsule 100 mg, 100 mg, Oral, Daily, Reyes, Thairy G, DO, 100 mg at 11/27/23 0942    gabapentin capsule 200 mg, 200 mg, Oral, QHS, Reyes, Thairy G, DO, 200 mg at 11/26/23 2145    levalbuterol nebulizer solution 1.25 mg, 1.25 mg, Nebulization, TID, Syd Oleary MD, 1.25 mg at 11/27/23 1307    levothyroxine tablet 88 mcg, 88 mcg, Oral, Daily, Reyes, Thairy G, DO, 88 mcg at 11/27/23 0942    melatonin tablet 9 mg, 9 mg, Oral, Nightly PRN, Reyes, Thairy G, DO, 9 mg at 11/26/23 2144    metoprolol injection 5 mg, 5 mg, Intravenous, Q5 Min PRN, Syd Oleary MD, 5 mg at 11/27/23 1302    metoprolol succinate (TOPROL-XL) 24 hr tablet 50 mg, 50 mg, Oral, Daily, Reyes, Thairy G, DO, 50 mg at 11/27/23 0942    multivitamin tablet, 1 tablet, Oral, Daily, Reyes, Thairy G, DO, 1 tablet at 11/27/23 0942    naloxone 0.4 mg/mL injection 0.02 mg, 0.02 mg, Intravenous, PRN, Reyes, Thairy G, DO    ondansetron disintegrating tablet 8 mg, 8 mg, Oral, Q8H PRN, Cristian Rush MD    ondansetron injection 4 mg, 4 mg, Intravenous, Q8H PRN, Reyes, Thairy G, DO    polyethylene glycol packet 17 g, 17 g, Oral, TID PRN, Reyes, Thairy G, DO    predniSONE tablet 50 mg, 50 mg, Oral, Daily, Reyes, Thairy G, DO, 50 mg at 11/27/23 0941    rivaroxaban tablet 20 mg, 20 mg, Oral, Daily, Reyes, Thairy G, DO, 20 mg at 11/26/23 1629    simethicone chewable tablet 80 mg, 1 tablet, Oral, QID PRN, Reyes, Thairy G, DO    sodium chloride 0.9% flush 10 mL, 10 mL, Intravenous, PRN, Reyes, Thairy G, DO    thiamine tablet 100 mg, 100 mg, Oral, Daily, Reyes, Thairy G, , 100 mg at 11/27/23 1000    traMADoL tablet 50 mg, 50 mg, Oral, Q6H PRN, Cristian Rush MD, 50 mg at 11/27/23 1301         VTE Risk Mitigation (From admission, onward)           Ordered     rivaroxaban tablet 20 mg  Daily         11/26/23 0659     IP VTE HIGH RISK PATIENT  Once         11/26/23 0659     Reason for No Pharmacological VTE Prophylaxis  Once         Question:  Reasons:  Answer:  Already adequately anticoagulated on oral Anticoagulants    11/26/23 0659     Place sequential compression device  Until discontinued         11/25/23 2302                    Assessment:   Paroxysmal atrial fibrillation with RVR  --on Xarelto 20 mg po daily  --CHA2D2 VASc=3 points  ---130  RSV Bronchiolitis  CLAY  HTN      Plan:   Echocardiogram today  Give an additional metoprolol succinate 50 mg today.  Discussed case with Dr. Jama with following recs:  --Cardizem 15 mg IV bolus, then Cardizem gtt @ 5 mg/hr (titration dosing)  --Transfer to [facility] for management of Afib and Cardizem gtt  --Admit to hospital medicine for RSV and consult [facility]    Thank you for your consult.     LORRAINE Britton  Cardiology  [facility]  11/27/2023 4:03 PM

## 2023-11-27 NOTE — NURSING
IP [facility] consult order received verbally.   Larua Wong placed an order for an ECHO.  Corby notified; unable to speak directly to him, but I left a voice message.  I also called ultrasound room #1 and 2, but did not receive an answer.

## 2023-11-27 NOTE — PLAN OF CARE
Problem: Adult Inpatient Plan of Care  Goal: Plan of Care Review  Outcome: Ongoing, Progressing  Flowsheets (Taken 11/27/2023 0347)  Plan of Care Reviewed With: patient  Goal: Patient-Specific Goal (Individualized)  Outcome: Ongoing, Progressing  Flowsheets (Taken 11/27/2023 0347)  Anxieties, Fears or Concerns: Pt concerned about wheezing and coughing worsening after nebulizer treatments  Individualized Care Needs: Assist pt with ADLs as needed, Monitor O2Sat and HR, Monitor Blood Glucose  Goal: Absence of Hospital-Acquired Illness or Injury  Outcome: Ongoing, Progressing  Goal: Optimal Comfort and Wellbeing  Outcome: Ongoing, Progressing  Intervention: Monitor Pain and Promote Comfort  Flowsheets (Taken 11/26/2023 2000)  Pain Management Interventions:   care clustered   diversional activity provided   pain management plan reviewed with patient/caregiver   pillow support provided   position adjusted   quiet environment facilitated   relaxation techniques promoted     Problem: Diabetes Comorbidity  Goal: Blood Glucose Level Within Targeted Range  Outcome: Ongoing, Progressing  Intervention: Monitor and Manage Glycemia  Flowsheets (Taken 11/27/2023 0347)  Glycemic Management: blood glucose monitored      "  Requested Prescriptions   Pending Prescriptions Disp Refills     CLINTONTIGJAIDEN SAFEPACK PEN NEEDLE 32G X 4 MM miscellaneous [Pharmacy Med Name: ULTICARE PEN NEEDLES 4MM] 100 each 1     Sig: USE 1 PEN NEEDLE DAILY FOR INSULIN INJECTIONS AS AS DIRECTED.     Last office visit: 10/5/2020 with prescribing provider:     Future Office Visit:        Diabetic Supplies Protocol Failed - 6/28/2021  6:03 PM        Failed - Recent (6 mo) or future (30 days) visit within the authorizing provider's specialty     Patient had office visit in the last 6 months or has a visit in the next 30 days with authorizing provider.  See \"Patient Info\" tab in inbasket, or \"Choose Columns\" in Meds & Orders section of the refill encounter.            Passed - Medication is active on med list        Passed - Patient is 18 years of age or older         Prescription approved per [facility] Refill Protocol.  Krystal Oneal RN      "

## 2023-11-27 NOTE — PROGRESS NOTES
Inpatient Nutrition Evaluation    Admit Date: 11/25/2023   Total duration of encounter: 2 days    Nutrition Recommendation/Prescription     Continue heart healthy diet. 2. Trial boost plus supplements. Pt reports fair intake.     Nutrition Assessment     Chart Review    Reason Seen: continuous nutrition monitoring and length of stay    Malnutrition Screening Tool Results   Have you recently lost weight without trying?: No  Have you been eating poorly because of a decreased appetite?: Yes   MST Score: 1     Diagnosis:  RSV bronchiolitis   Atrial fibrillation with RVR  Obstructive sleep apnea      History of: HTN, atrial fibrillation, Bariatric surgery       Relevant Medical History: HTN, spinal stenosis, HLD, CLAY (has not had a functioning CPAP for 6 months), AF and gastric sleeve     Nutrition-Related Medications: furosemide, levothyroxine, multivitamin, prednisone, thiamine    Nutrition-Related Labs:   Latest Reference Range & Units 11/27/23 03:38   WBC 4.50 - 11.50 x10(3)/mcL 9.05   RBC 4.20 - 5.40 x10(6)/mcL 4.09 (L)   Hemoglobin 12.0 - 16.0 g/dL 10.9 (L)   Hematocrit 37.0 - 47.0 % 36.5 (L)   MCV 80.0 - 94.0 fL 89.2   MCH 27.0 - 31.0 pg 26.7 (L)   MCHC 33.0 - 36.0 g/dL 29.9 (L)   RDW 11.5 - 17.0 % 16.0   Platelet Count 130 - 400 x10(3)/mcL 244   MPV 7.4 - 10.4 fL 10.9 (H)   Neut % % 76.3   LYMPH % % 19.6   Mono % % 3.6   Eosinophil % % 0.0   Basophil % % 0.1   Immature Granulocytes % 0.4   Neut # 2.1 - 9.2 x10(3)/mcL 6.90   Lymph # 0.6 - 4.6 x10(3)/mcL 1.77   Mono # 0.1 - 1.3 x10(3)/mcL 0.33   Eos # 0 - 0.9 x10(3)/mcL 0.00   Baso # <=0.2 x10(3)/mcL 0.01   Immature Grans (Abs) 0 - 0.04 x10(3)/mcL 0.04   Sodium 136 - 145 mmol/L 144   Potassium 3.5 - 5.1 mmol/L 3.8   Chloride 98 - 107 mmol/L 107   CO2 23 - 31 mmol/L 25   Anion Gap mEq/L 12.0   BUN 9.8 - 20.1 mg/dL 19.2   Creatinine 0.55 - 1.02 mg/dL 0.69   BUN/CREAT RATIO  28   eGFR mls/min/1.73/m2 >60   Glucose 82 - 115 mg/dL 142 (H)   Calcium 8.4 - 10.2 mg/dL 8.8  "  (L): Data is abnormally low  (H): Data is abnormally high    Diet Order: Diet heart healthy  Oral Supplement Order: none  Appetite/Oral Intake: fair/50-75% of meals  Factors Affecting Nutritional Intake: decreased appetite  Food/Mandaeism/Cultural Preferences:     Food Allergies: none reported       Wound(s):       Comments    Pt reports decrease intake. Pt in isolation. Provide phone call to patient. Discussed food preferences. Pt stated would try boost supplement. Encourage good PO Intake.     Anthropometrics    Height: 5' 3" (160 cm) Height Method: Stated  Last Weight: 121.8 kg (268 lb 8.3 oz) (11/27/23 0500) Weight Method: Bed Scale  BMI (Calculated): 47.6  BMI Classification: obese grade III (BMI >/=40)     Ideal Body Weight (IBW), Female: 115 lb     % Ideal Body Weight, Female (lb): 233.5 %                             Usual Weight Provided By: unable to obtain usual weight    Wt Readings from Last 5 Encounters:   11/27/23 121.8 kg (268 lb 8.3 oz)   09/28/23 119.4 kg (263 lb 3.2 oz)   07/27/23 119.7 kg (264 lb)   05/08/23 113.4 kg (250 lb)   03/27/23 117.3 kg (258 lb 8 oz)     Weight Change(s) Since Admission:  Admit Weight: 113.4 kg (250 lb) (11/25/23 2057)      Patient Education    Not applicable.    Monitoring & Evaluation     Dietitian will monitor food and beverage intake, weight, weight change, electrolyte/renal panel, glucose/endocrine profile, and gastrointestinal profile.  Nutrition Risk/Follow-Up: low (follow-up in 5-7 days)  Patients assigned 'low nutrition risk' status do not qualify for a full nutritional assessment but will be monitored and re-evaluated in a 5-7 day time period. Please consult if re-evaluation needed sooner.  "

## 2023-11-27 NOTE — PROGRESS NOTES
[facility] MEDICINE ~ PROGRESS NOTE        CHIEF COMPLAINT   Hospital follow up    HOSPITAL COURSE   79 yo female with PMH of HTN, spinal stenosis, HLD, CLAY (has not had a functioning CPAP for 6 months), AF and gastric sleeve who presents with complaint of wheezing and cough that started 6 days ago. She was prescribed a Z-johnson and an inhaler and completed the course. Symptoms did not improve so she followed up at a walk in clinic on Friday where she tested + for RSV. She was given a steroid shot but symptoms continued to worsen and she presented to our ED. At baseline patient lives alone and ambulates with a walker.      Today  Admitted for RSV, still has significant wheezing but on room air.  She is also in atrial fibrillation with RVR had to receive some IV push this morning.  Will change her over to Xopenex.        OBJECTIVE/PHYSICAL EXAM     VITAL SIGNS (MOST RECENT):  Temp: 97.6 °F (36.4 °C) (11/27/23 0346)  Pulse: (!) 118 (11/27/23 0700)  Resp: 18 (11/27/23 0700)  BP: 109/72 (11/27/23 0500)  SpO2: (!) 94 % (11/27/23 0700) VITAL SIGNS (24 HOUR RANGE):  Temp:  [97.6 °F (36.4 °C)-98.7 °F (37.1 °C)] 97.6 °F (36.4 °C)  Pulse:  [] 118  Resp:  [18-20] 18  SpO2:  [93 %-99 %] 94 %  BP: (107-159)/(66-88) 109/72   GENERAL: In no acute distress, afebrile  HEENT:  CHEST:  Bilateral wheezing  HEART: S1, S2, no appreciable murmur  ABDOMEN: Soft, nontender, BS +  MSK: Warm, no lower extremity edema, no clubbing or cyanosis  NEUROLOGIC: Alert and oriented x4, moving all extremities with good strength   INTEGUMENTARY:  PSYCHIATRY:        ASSESSMENT/PLAN   RSV bronchiolitis   Atrial fibrillation with RVR  Obstructive sleep apnea     History of: HTN, atrial fibrillation, Bariatric surgery      Change to Xopenex 3 times daily.  Prednisone 50 daily.    Metoprolol 50 XL daily, Xarelto 20.  Metoprolol IV push PRN.  Family requesting Cardiology involvement.    Hope to get her home  "soon    DVT prophylaxis:  Xarelto 20    Critical care diagnosis: Atrial fibrillation with RVR  Critical care time spent:  35 minutes    Anticipated discharge and disposition:   __________________________________________________________________________    LABS/MICRO/MEDS/DIAGNOSTICS       LABS  Recent Labs     11/25/23 2206 11/26/23  0833 11/27/23  0338   *   < > 144   K 3.7   < > 3.8   CHLORIDE 108*   < > 107   CO2 23   < > 25   BUN 18.6   < > 19.2   CREATININE 0.73   < > 0.69   GLUCOSE 140*   < > 142*   CALCIUM 9.4   < > 8.8   ALKPHOS 114  --   --    AST 26  --   --    ALT 49  --   --    ALBUMIN 3.5  --   --     < > = values in this interval not displayed.     Recent Labs     11/27/23 0338   WBC 9.05   RBC 4.09*   HCT 36.5*   MCV 89.2          MICROBIOLOGY  Microbiology Results (last 7 days)       ** No results found for the last 168 hours. **               MEDICATIONS   amLODIPine  5 mg Oral Daily    benzonatate  200 mg Oral TID    famotidine  20 mg Oral BID    furosemide  40 mg Oral Daily    gabapentin  100 mg Oral Daily    gabapentin  200 mg Oral QHS    levalbuterol  1.25 mg Nebulization TID    levothyroxine  88 mcg Oral Daily    metoprolol succinate  50 mg Oral Daily    multivitamin  1 tablet Oral Daily    predniSONE  50 mg Oral Daily    rivaroxaban  20 mg Oral Daily    thiamine  100 mg Oral Daily         INFUSIONS         DIAGNOSTIC TESTS  X-Ray Chest AP Portable   ED Interpretation   No acute cardiopulmonary process      Final Result      No acute abnormality of the chest.         Electronically signed by: Columba Benson   Date:    11/26/2023   Time:    13:14           No results found for: "EF"       NUTRITION STATUS  Patient meets ASPEN criteria for   malnutrition of   per RD assessment as evidenced by:                       A minimum of two characteristics is recommended for diagnosis of either severe or non-severe malnutrition.       Case related differential diagnoses have been reviewed; " assessment and plan has been documented. I have personally reviewed the labs and test results that are currently available; I have reviewed the patients medication list. I have reviewed the consulting providers recommendations. I have reviewed or attempted to review medical records based upon their availability.  All of the patient's and/or family's questions have been addressed and answered to the best of my ability.  I will continue to monitor closely and make adjustments to medical management as needed.  This document was created using M*Modal Fluency Direct.  Transcription errors may have been made.  Please contact me if any questions may rise regarding documentation to clarify transcription.        Syd Oleary MD   Internal Medicine  Department of Hospital Medicine  [facility]

## 2023-11-28 LAB
BSA FOR ECHO PROCEDURE: 2.33 M2
CV ECHO LV RWT: 0.36 CM
ECHO LV POSTERIOR WALL: 0.9 CM (ref 0.6–1.1)
EJECTION FRACTION: 65 %
FRACTIONAL SHORTENING: 28 % (ref 28–44)
INTERVENTRICULAR SEPTUM: 0.9 CM (ref 0.6–1.1)
LEFT INTERNAL DIMENSION IN SYSTOLE: 3.6 CM (ref 2.1–4)
LEFT VENTRICLE MASS INDEX: 72 G/M2
LEFT VENTRICULAR INTERNAL DIMENSION IN DIASTOLE: 5 CM (ref 3.5–6)
LEFT VENTRICULAR MASS: 158.21 G
POCT GLUCOSE: 112 MG/DL (ref 70–110)
Z-SCORE OF LEFT VENTRICULAR DIMENSION IN END DIASTOLE: -3.92
Z-SCORE OF LEFT VENTRICULAR DIMENSION IN END SYSTOLE: -1.76

## 2023-11-28 PROCEDURE — 25000003 PHARM REV CODE 250: Performed by: NURSE PRACTITIONER

## 2023-11-28 PROCEDURE — G0378 HOSPITAL OBSERVATION PER HR: HCPCS

## 2023-11-28 PROCEDURE — 96376 TX/PRO/DX INJ SAME DRUG ADON: CPT

## 2023-11-28 PROCEDURE — 97161 PT EVAL LOW COMPLEX 20 MIN: CPT

## 2023-11-28 PROCEDURE — 94640 AIRWAY INHALATION TREATMENT: CPT

## 2023-11-28 PROCEDURE — 25000003 PHARM REV CODE 250: Performed by: STUDENT IN AN ORGANIZED HEALTH CARE EDUCATION/TRAINING PROGRAM

## 2023-11-28 PROCEDURE — 25000003 PHARM REV CODE 250: Performed by: INTERNAL MEDICINE

## 2023-11-28 PROCEDURE — 25000003 PHARM REV CODE 250: Performed by: SPECIALIST

## 2023-11-28 PROCEDURE — 94660 CPAP INITIATION&MGMT: CPT

## 2023-11-28 PROCEDURE — 97165 OT EVAL LOW COMPLEX 30 MIN: CPT

## 2023-11-28 PROCEDURE — 99900035 HC TECH TIME PER 15 MIN (STAT)

## 2023-11-28 PROCEDURE — 63600175 PHARM REV CODE 636 W HCPCS: Performed by: STUDENT IN AN ORGANIZED HEALTH CARE EDUCATION/TRAINING PROGRAM

## 2023-11-28 PROCEDURE — 25000242 PHARM REV CODE 250 ALT 637 W/ HCPCS: Performed by: INTERNAL MEDICINE

## 2023-11-28 PROCEDURE — 94760 N-INVAS EAR/PLS OXIMETRY 1: CPT

## 2023-11-28 RX ORDER — METOPROLOL TARTRATE 50 MG/1
50 TABLET ORAL 2 TIMES DAILY
Status: DISCONTINUED | OUTPATIENT
Start: 2023-11-28 | End: 2023-12-03 | Stop reason: HOSPADM

## 2023-11-28 RX ORDER — METOPROLOL TARTRATE 1 MG/ML
5 INJECTION, SOLUTION INTRAVENOUS EVERY 5 MIN PRN
Status: DISCONTINUED | OUTPATIENT
Start: 2023-11-28 | End: 2023-12-03 | Stop reason: HOSPADM

## 2023-11-28 RX ORDER — DILTIAZEM HYDROCHLORIDE 5 MG/ML
15 INJECTION INTRAVENOUS ONCE AS NEEDED
Status: DISCONTINUED | OUTPATIENT
Start: 2023-11-28 | End: 2023-12-03 | Stop reason: HOSPADM

## 2023-11-28 RX ADMIN — GUAIFENESIN AND DEXTROMETHORPHAN 10 ML: 100; 10 SYRUP ORAL at 05:11

## 2023-11-28 RX ADMIN — RIVAROXABAN 20 MG: 20 TABLET, FILM COATED ORAL at 05:11

## 2023-11-28 RX ADMIN — PREDNISONE 50 MG: 20 TABLET ORAL at 09:11

## 2023-11-28 RX ADMIN — LEVOTHYROXINE SODIUM 88 MCG: 88 TABLET ORAL at 09:11

## 2023-11-28 RX ADMIN — POLYETHYLENE GLYCOL 3350 17 G: 17 POWDER, FOR SOLUTION ORAL at 12:11

## 2023-11-28 RX ADMIN — TRAMADOL HYDROCHLORIDE 50 MG: 50 TABLET, COATED ORAL at 12:11

## 2023-11-28 RX ADMIN — TRAMADOL HYDROCHLORIDE 50 MG: 50 TABLET, COATED ORAL at 05:11

## 2023-11-28 RX ADMIN — GABAPENTIN 100 MG: 100 CAPSULE ORAL at 09:11

## 2023-11-28 RX ADMIN — GUAIFENESIN AND DEXTROMETHORPHAN 10 ML: 100; 10 SYRUP ORAL at 12:11

## 2023-11-28 RX ADMIN — MELATONIN TAB 3 MG 9 MG: 3 TAB at 08:11

## 2023-11-28 RX ADMIN — FAMOTIDINE 20 MG: 20 TABLET ORAL at 09:11

## 2023-11-28 RX ADMIN — LEVALBUTEROL HYDROCHLORIDE 1.25 MG: 1.25 SOLUTION RESPIRATORY (INHALATION) at 06:11

## 2023-11-28 RX ADMIN — GUAIFENESIN AND DEXTROMETHORPHAN 10 ML: 100; 10 SYRUP ORAL at 09:11

## 2023-11-28 RX ADMIN — BENZONATATE 200 MG: 100 CAPSULE ORAL at 03:11

## 2023-11-28 RX ADMIN — GABAPENTIN 200 MG: 100 CAPSULE ORAL at 08:11

## 2023-11-28 RX ADMIN — TRAMADOL HYDROCHLORIDE 50 MG: 50 TABLET, COATED ORAL at 08:11

## 2023-11-28 RX ADMIN — GUAIFENESIN AND DEXTROMETHORPHAN 10 ML: 100; 10 SYRUP ORAL at 11:11

## 2023-11-28 RX ADMIN — FUROSEMIDE 40 MG: 40 TABLET ORAL at 09:11

## 2023-11-28 RX ADMIN — METOPROLOL TARTRATE 50 MG: 50 TABLET, FILM COATED ORAL at 08:11

## 2023-11-28 RX ADMIN — FAMOTIDINE 20 MG: 20 TABLET ORAL at 08:11

## 2023-11-28 RX ADMIN — THIAMINE HCL TAB 100 MG 100 MG: 100 TAB at 09:11

## 2023-11-28 RX ADMIN — BENZONATATE 200 MG: 100 CAPSULE ORAL at 08:11

## 2023-11-28 RX ADMIN — BENZONATATE 200 MG: 100 CAPSULE ORAL at 09:11

## 2023-11-28 RX ADMIN — MULTIPLE VITAMINS W/ MINERALS TAB 1 TABLET: TAB at 09:11

## 2023-11-28 RX ADMIN — METOPROLOL TARTRATE 5 MG: 1 INJECTION, SOLUTION INTRAVENOUS at 10:11

## 2023-11-28 RX ADMIN — LEVALBUTEROL HYDROCHLORIDE 1.25 MG: 1.25 SOLUTION RESPIRATORY (INHALATION) at 01:11

## 2023-11-28 RX ADMIN — METOPROLOL SUCCINATE 50 MG: 50 TABLET, FILM COATED, EXTENDED RELEASE ORAL at 09:11

## 2023-11-28 NOTE — PLAN OF CARE
Problem: Adult Inpatient Plan of Care  Goal: Plan of Care Review  Outcome: Ongoing, Progressing  Flowsheets (Taken 11/28/2023 0056)  Plan of Care Reviewed With: patient  Goal: Patient-Specific Goal (Individualized)  Outcome: Ongoing, Progressing  Flowsheets (Taken 11/28/2023 0056)  Anxieties, Fears or Concerns: transfer process and elevated hr  Individualized Care Needs: telemetry transfer process ongoing isolation maintained     Problem: Infection  Goal: Absence of Infection Signs and Symptoms  Outcome: Ongoing, Progressing  Intervention: Prevent or Manage Infection  Flowsheets (Taken 11/28/2023 0056)  Isolation Precautions:   droplet   precautions maintained     Problem: Dysrhythmia  Goal: Normalized Cardiac Rhythm  Outcome: Ongoing, Progressing  Intervention: Monitor and Manage Cardiac Rhythm Effect  Flowsheets (Taken 11/28/2023 0056)  VTE Prevention/Management: ambulation promoted

## 2023-11-28 NOTE — NURSING
Received report this morning that the patient's heart rate was elevated.  Communicated with MD; order placed for lopressor IV push x 3.  Medication administered and had no effect of correcting her heart rate and rhythm.  CIS consult order placed and completed.  ECHO ordered.  Cardizem IV push administered.  Vitals before administrations /92  .  Administration began at 1831 with 1 mL, 1832 with 1 mL, and 1833 with 1 mL.  BP and HR after administration at 1834; BP 92/64  HR 90.  At 1845 /70 . 1900 BP  117/81 .  At this time I will be ending care of this patient on my shift [id number] and transferring care of the patient to JOSE C Steele with bedside shift report.  No acute distress noted at this time.                 I I

## 2023-11-28 NOTE — PLAN OF CARE
Problem: Adult Inpatient Plan of Care  Goal: Plan of Care Review  Outcome: Ongoing, Progressing  Goal: Patient-Specific Goal (Individualized)  Outcome: Ongoing, Progressing  Flowsheets (Taken 11/28/2023 0800)  Anxieties, Fears or Concerns: None expressed  Individualized Care Needs: Continuous tele monitoring, monitor for s/s of chest pain, maintain droplet precautions     Problem: Infection  Goal: Absence of Infection Signs and Symptoms  Outcome: Ongoing, Progressing     Problem: Dysrhythmia  Goal: Normalized Cardiac Rhythm  Outcome: Ongoing, Progressing  Intervention: Monitor and Manage Cardiac Rhythm Effect  Flowsheets (Taken 11/28/2023 0800)  Dysrhythmia Management: (Tele E018; communicate with [phone]) other (see comments)  Stabilization Measures:   legs elevated   provider notified

## 2023-11-28 NOTE — PLAN OF CARE
Problem: Adult Inpatient Plan of Care  Goal: Plan of Care Review  11/27/2023 1944 by Seda Martinez RN  Outcome: Ongoing, Progressing  11/27/2023 1943 by Seda Martinez RN  Outcome: Ongoing, Progressing  Goal: Patient-Specific Goal (Individualized)  11/27/2023 1944 by Seda Martinez RN  Outcome: Ongoing, Progressing  Flowsheets (Taken 11/27/2023 0800)  Anxieties, Fears or Concerns: Concerned that CPAP caused her elevated HR  Individualized Care Needs:   Monitor HR and communicate with MD   process orders as received  11/27/2023 1943 by Seda Martinez RN  Outcome: Ongoing, Progressing     Problem: Infection  Goal: Absence of Infection Signs and Symptoms  11/27/2023 1944 by Seda Martinez RN  Outcome: Ongoing, Progressing  11/27/2023 1943 by Seda Martinez RN  Outcome: Ongoing, Progressing  Intervention: Prevent or Manage Infection  Flowsheets (Taken 11/27/2023 0800)  Fever Reduction/Comfort Measures:   lightweight bedding   lightweight clothing  Infection Management: aseptic technique maintained  Isolation Precautions:   droplet   precautions maintained     Problem: Dysrhythmia  Goal: Normalized Cardiac Rhythm  Outcome: Ongoing, Progressing  Intervention: Monitor and Manage Cardiac Rhythm Effect  Flowsheets (Taken 11/27/2023 0800)  Dysrhythmia Management: (Tele E018) other (see comments)  VTE Prevention/Management:   ambulation promoted   bleeding precations maintained   ROM (active) performed   bleeding risk assessed   bleeding risk factor(s) identified, provider notified  Stabilization Measures:   legs elevated   provider notified

## 2023-11-28 NOTE — PROGRESS NOTES
[facility] MEDICINE ~ PROGRESS NOTE        CHIEF COMPLAINT   Hospital follow up    HOSPITAL COURSE   77 yo female with PMH of HTN, spinal stenosis, HLD, CLAY (has not had a functioning CPAP for 6 months), AF and gastric sleeve who presents with complaint of wheezing and cough that started 6 days ago. She was prescribed a Z-johnson and an inhaler and completed the course. Symptoms did not improve so she followed up at a walk in clinic on Friday where she tested + for RSV. She was given a steroid shot but symptoms continued to worsen and she presented to our ED. At baseline patient lives alone and ambulates with a walker.      Today  Cardiology seen the patient and had wanted a Cardizem drip however this was never started and patient is already rate controlled morning.  Discussed with nurse practitioner Laura as well this morning.  Daughter was in the room as well.        OBJECTIVE/PHYSICAL EXAM     VITAL SIGNS (MOST RECENT):  Temp: 97.9 °F (36.6 °C) (11/28/23 0300)  Pulse: 98 (11/27/23 2302)  Resp: 16 (11/28/23 0518)  BP: (!) 140/82 (11/28/23 0300)  SpO2: 96 % (11/28/23 0300) VITAL SIGNS (24 HOUR RANGE):  Temp:  [97.8 °F (36.6 °C)-98.2 °F (36.8 °C)] 97.9 °F (36.6 °C)  Pulse:  [] 98  Resp:  [16-22] 16  SpO2:  [93 %-96 %] 96 %  BP: (123-149)/(82-92) 140/82   GENERAL: In no acute distress, afebrile  HEENT:  CHEST:  Bilateral wheezing  HEART: S1, S2, no appreciable murmur  ABDOMEN: Soft, nontender, BS +  MSK: Warm, no lower extremity edema, no clubbing or cyanosis  NEUROLOGIC: Alert and oriented x4, moving all extremities with good strength   INTEGUMENTARY:  PSYCHIATRY:        ASSESSMENT/PLAN   RSV bronchiolitis   Atrial fibrillation with RVR  Obstructive sleep apnea     History of: HTN, atrial fibrillation, Bariatric surgery      Xopenex 3 times daily.  Prednisone 50 daily.    Metoprolol 50 XL daily, Xarelto 20.  Metoprolol IV push PRN.  Cardiology following.  Pending  further recommendations regarding heart rate control.  Start PT to help mobilize    DVT prophylaxis:  Xarelto 20    Anticipated discharge and disposition:   __________________________________________________________________________    LABS/MICRO/MEDS/DIAGNOSTICS       LABS  Recent Labs     11/25/23 2206 11/26/23 0833 11/27/23  0338   *   < > 144   K 3.7   < > 3.8   CHLORIDE 108*   < > 107   CO2 23   < > 25   BUN 18.6   < > 19.2   CREATININE 0.73   < > 0.69   GLUCOSE 140*   < > 142*   CALCIUM 9.4   < > 8.8   ALKPHOS 114  --   --    AST 26  --   --    ALT 49  --   --    ALBUMIN 3.5  --   --     < > = values in this interval not displayed.       Recent Labs     11/27/23 0338   WBC 9.05   RBC 4.09*   HCT 36.5*   MCV 89.2            MICROBIOLOGY  Microbiology Results (last 7 days)       ** No results found for the last 168 hours. **               MEDICATIONS   benzonatate  200 mg Oral TID    famotidine  20 mg Oral BID    furosemide  40 mg Oral Daily    gabapentin  100 mg Oral Daily    gabapentin  200 mg Oral QHS    levalbuterol  1.25 mg Nebulization TID    levothyroxine  88 mcg Oral Daily    metoprolol succinate  50 mg Oral Daily    multivitamin  1 tablet Oral Daily    predniSONE  50 mg Oral Daily    rivaroxaban  20 mg Oral Daily    thiamine  100 mg Oral Daily         INFUSIONS   dilTIAZem            DIAGNOSTIC TESTS  X-Ray Chest AP Portable   ED Interpretation   No acute cardiopulmonary process      Final Result      No acute abnormality of the chest.         Electronically signed by: Columba Benson   Date:    11/26/2023   Time:    13:14           EF   Date Value Ref Range Status   11/27/2023 65 % Final          NUTRITION STATUS  Patient meets ASPEN criteria for   malnutrition of   per RD assessment as evidenced by:                       A minimum of two characteristics is recommended for diagnosis of either severe or non-severe malnutrition.       Case related differential diagnoses have been  reviewed; assessment and plan has been documented. I have personally reviewed the labs and test results that are currently available; I have reviewed the patients medication list. I have reviewed the consulting providers recommendations. I have reviewed or attempted to review medical records based upon their availability.  All of the patient's and/or family's questions have been addressed and answered to the best of my ability.  I will continue to monitor closely and make adjustments to medical management as needed.  This document was created using M*Modal Fluency Direct.  Transcription errors may have been made.  Please contact me if any questions may rise regarding documentation to clarify transcription.        Syd Oleary MD   Internal Medicine  Department of Hospital Medicine [facility]

## 2023-11-28 NOTE — PROGRESS NOTES
[facility]    Cardiology  Progress Note    Patient Name: Vesta Lala  MRN: [id number]  Admission Date: 11/25/2023  Hospital Length of Stay: 0 days  Code Status: Full Code   Attending Physician: Reyes, Thairy G, DO   Primary Care Physician: Stephanie Sotelo MD  Expected Discharge Date:   Principal Problem:Acute bronchitis due to respiratory syncytial virus (RSV)    Subjective:     Brief HPI:  Patient is a 78 year old WF known to [facility], Dr. Jama with PMHx: CAD/nonobstructive (mid LAD 30%), PAF post gastric sleeve-2014 and is on Xarelto 20 mg daily, HTN, HLD, hypothyroid, spinal stenosis, CLAY (non functioning CPAP >6 months). Patient presents to ED with c/o of  worsening cough, wheezing. She has recent dx of RSV and treatment with steroid shot.  Today patient had c/o of palpitations and was noted to be in Afib with RVR.  Metoprolol 5 mg IV has been given at 1059 and 1302 with liable HR in 110-120s with some borderline hypotension.  She remains on metoprolol succinate 50 mg daily.  BP has been stable, currently with -130,  [facility] is consulted for Afib RVR     PMH: CAD, PAF, HTN, HLD, hypothyroid, spinal stenosis, CLAY  PSH: [facility] (10.30.2012), carpal tunnel release, cholecystectomy, Gastric sleeve, hysterectomy, joint replacement, tonsillectomy.  Family History: Non-contributory  Social History: Denies tobacco, ETOH, illicit drug use     Previous Cardiac Diagnostics:   Echocardiogram 11.27.23:  Left Ventricle: The left ventricle is normal in size. Normal wall thickness. Normal wall motion. There is normal systolic function with a visually estimated ejection fraction of 60 - 65%. Ejection fraction by visual approximation is 65%.  Right Ventricle: Normal right ventricular cavity size. Systolic function is normal.  Pericardium: There is a small effusion. No indication of cardiac tamponade.     Technically difficult study due to poor acoustic window.  Echocardiogram 4.19.2021:  The  study quality is below average.   The left ventricle is normal in size. Global left ventricular systolic function is normal. The left ventricular ejection fraction is 65%.   Mild to moderate (1-2+) aortic regurgitation. Mild (1+) mitral regurgitation. Mild (1+) tricuspid regurgitation.  The pulmonary artery systolic pressure is 29 mmHg.      Carotid US 4.19.2021:  The study quality is average.   1-39% stenosis in the proximal right internal carotid artery based on Bluth Criteria.   1-39% stenosis in the proximal left internal carotid artery based on Bluth Criteria.   Antegrade right vertebral artery flow.   Antegrade left vertebral artery flow.      PET 11.3.2017:  Normal perfusion study, no perfusion defect.    No evidence of ischemia     [facility] 10.30.2012:  Left main is normal giving rise to left anterior descending artery and left circumflex artery  Left anterior descending artery.  The patient does have diffuse 30-40% disease  in the mid to distal vessel, smaller caliber distal vessel; however, no obstructive lesion.  Diagonal was a small to moderate sized branch with a 40% mid vessel stenosis  Left circumflex artery gives rise to two large obtuse marginal branches with proximal 30% stenosis of second obtuse marginal.  Right coronary artery is a large right dominant vessel without evidence of disease.  Left ventriculogram revealed left ventricular function, ejection fraction 55%    Hospital Course:   11.28.23:      Review of Systems   Constitutional: Positive for malaise/fatigue.   Respiratory:  Positive for cough.    All other systems reviewed and are negative.      Objective:     Vital Signs (Most Recent):  Temp: 97.9 °F (36.6 °C) (11/28/23 0300)  Pulse: 98 (11/27/23 2302)  Resp: 16 (11/28/23 0518)  BP: (!) 140/82 (11/28/23 0300)  SpO2: 96 % (11/28/23 0300) Vital Signs (24h Range):  Temp:  [97.8 °F (36.6 °C)-98.2 °F (36.8 °C)] 97.9 °F (36.6 °C)  Pulse:  [] 98  Resp:  [16-22] 16  SpO2:  [93 %-96 %] 96 %  BP:  (123-149)/(82-92) 140/82     Weight: 121.8 kg (268 lb 8.3 oz)  Body mass index is 47.57 kg/m².    SpO2: 96 %         Intake/Output Summary (Last 24 hours) at 11/28/2023 0721  Last data filed at 11/27/2023 1800  Gross per 24 hour   Intake 480 ml   Output --   Net 480 ml       Lines/Drains/Airways       Peripheral Intravenous Line  Duration                  Peripheral IV - Single Lumen 11/27/23 0935 22 G Anterior;Right Forearm <1 day                    Significant Labs:   Recent Results (from the past 72 hour(s))   COVID/RSV/FLU A&B PCR    Collection Time: 11/25/23  9:34 PM   Result Value Ref Range    Influenza A PCR Not Detected Not Detected    Influenza B PCR Not Detected Not Detected    Respiratory Syncytial Virus PCR Detected (A) Not Detected    SARS-CoV-2 PCR Not Detected Not Detected, Negative, Invalid   Comprehensive metabolic panel    Collection Time: 11/25/23 10:06 PM   Result Value Ref Range    Sodium Level 146 (H) 136 - 145 mmol/L    Potassium Level 3.7 3.5 - 5.1 mmol/L    Chloride 108 (H) 98 - 107 mmol/L    Carbon Dioxide 23 23 - 31 mmol/L    Glucose Level 140 (H) 82 - 115 mg/dL    Blood Urea Nitrogen 18.6 9.8 - 20.1 mg/dL    Creatinine 0.73 0.55 - 1.02 mg/dL    Calcium Level Total 9.4 8.4 - 10.2 mg/dL    Protein Total 6.8 5.8 - 7.6 gm/dL    Albumin Level 3.5 3.4 - 4.8 g/dL    Globulin 3.3 2.4 - 3.5 gm/dL    Albumin/Globulin Ratio 1.1 1.1 - 2.0 ratio    Bilirubin Total 0.2 <=1.5 mg/dL    Alkaline Phosphatase 114 40 - 150 unit/L    Alanine Aminotransferase 49 0 - 55 unit/L    Aspartate Aminotransferase 26 5 - 34 unit/L    eGFR >60 mls/min/1.73/m2   CBC with Differential    Collection Time: 11/25/23 10:06 PM   Result Value Ref Range    WBC 8.39 4.50 - 11.50 x10(3)/mcL    RBC 4.16 (L) 4.20 - 5.40 x10(6)/mcL    Hgb 10.9 (L) 12.0 - 16.0 g/dL    Hct 37.1 37.0 - 47.0 %    MCV 89.2 80.0 - 94.0 fL    MCH 26.2 (L) 27.0 - 31.0 pg    MCHC 29.4 (L) 33.0 - 36.0 g/dL    RDW 16.2 11.5 - 17.0 %    Platelet 238 130 - 400  x10(3)/mcL    MPV 10.8 (H) 7.4 - 10.4 fL    Neut % 69.2 %    Lymph % 25.0 %    Mono % 5.4 %    Eos % 0.0 %    Basophil % 0.2 %    Lymph # 2.10 0.6 - 4.6 x10(3)/mcL    Neut # 5.80 2.1 - 9.2 x10(3)/mcL    Mono # 0.45 0.1 - 1.3 x10(3)/mcL    Eos # 0.00 0 - 0.9 x10(3)/mcL    Baso # 0.02 <=0.2 x10(3)/mcL    IG# 0.02 0 - 0.04 x10(3)/mcL    IG% 0.2 %   Hemoglobin A1c if not done in past 3 months    Collection Time: 11/25/23 10:06 PM   Result Value Ref Range    Hemoglobin A1c 6.3 <=7.0 %    Estimated Average Glucose 134.1 mg/dL   POCT glucose    Collection Time: 11/26/23  5:58 AM   Result Value Ref Range    POCT Glucose 142 (H) 70 - 110 mg/dL   Basic Metabolic Panel    Collection Time: 11/26/23  8:33 AM   Result Value Ref Range    Sodium Level 144 136 - 145 mmol/L    Potassium Level 4.0 3.5 - 5.1 mmol/L    Chloride 108 (H) 98 - 107 mmol/L    Carbon Dioxide 26 23 - 31 mmol/L    Glucose Level 185 (H) 82 - 115 mg/dL    Blood Urea Nitrogen 18.0 9.8 - 20.1 mg/dL    Creatinine 0.69 0.55 - 1.02 mg/dL    BUN/Creatinine Ratio 26     Calcium Level Total 8.7 8.4 - 10.2 mg/dL    Anion Gap 10.0 mEq/L    eGFR >60 mls/min/1.73/m2   Magnesium    Collection Time: 11/26/23  8:33 AM   Result Value Ref Range    Magnesium Level 1.90 1.60 - 2.60 mg/dL   CBC with Differential    Collection Time: 11/26/23  8:33 AM   Result Value Ref Range    WBC 8.11 4.50 - 11.50 x10(3)/mcL    RBC 3.87 (L) 4.20 - 5.40 x10(6)/mcL    Hgb 10.2 (L) 12.0 - 16.0 g/dL    Hct 35.1 (L) 37.0 - 47.0 %    MCV 90.7 80.0 - 94.0 fL    MCH 26.4 (L) 27.0 - 31.0 pg    MCHC 29.1 (L) 33.0 - 36.0 g/dL    RDW 16.4 11.5 - 17.0 %    Platelet 199 130 - 400 x10(3)/mcL    MPV 10.9 (H) 7.4 - 10.4 fL    Neut % 81.4 %    Lymph % 17.3 %    Mono % 1.1 %    Eos % 0.0 %    Basophil % 0.0 %    Lymph # 1.40 0.6 - 4.6 x10(3)/mcL    Neut # 6.60 2.1 - 9.2 x10(3)/mcL    Mono # 0.09 (L) 0.1 - 1.3 x10(3)/mcL    Eos # 0.00 0 - 0.9 x10(3)/mcL    Baso # 0.00 <=0.2 x10(3)/mcL    IG# 0.02 0 - 0.04 x10(3)/mcL     IG% 0.2 %   POCT glucose    Collection Time: 11/26/23 11:42 AM   Result Value Ref Range    POCT Glucose 126 (H) 70 - 110 mg/dL   Basic Metabolic Panel    Collection Time: 11/27/23  3:38 AM   Result Value Ref Range    Sodium Level 144 136 - 145 mmol/L    Potassium Level 3.8 3.5 - 5.1 mmol/L    Chloride 107 98 - 107 mmol/L    Carbon Dioxide 25 23 - 31 mmol/L    Glucose Level 142 (H) 82 - 115 mg/dL    Blood Urea Nitrogen 19.2 9.8 - 20.1 mg/dL    Creatinine 0.69 0.55 - 1.02 mg/dL    BUN/Creatinine Ratio 28     Calcium Level Total 8.8 8.4 - 10.2 mg/dL    Anion Gap 12.0 mEq/L    eGFR >60 mls/min/1.73/m2   CBC with Differential    Collection Time: 11/27/23  3:38 AM   Result Value Ref Range    WBC 9.05 4.50 - 11.50 x10(3)/mcL    RBC 4.09 (L) 4.20 - 5.40 x10(6)/mcL    Hgb 10.9 (L) 12.0 - 16.0 g/dL    Hct 36.5 (L) 37.0 - 47.0 %    MCV 89.2 80.0 - 94.0 fL    MCH 26.7 (L) 27.0 - 31.0 pg    MCHC 29.9 (L) 33.0 - 36.0 g/dL    RDW 16.0 11.5 - 17.0 %    Platelet 244 130 - 400 x10(3)/mcL    MPV 10.9 (H) 7.4 - 10.4 fL    Neut % 76.3 %    Lymph % 19.6 %    Mono % 3.6 %    Eos % 0.0 %    Basophil % 0.1 %    Lymph # 1.77 0.6 - 4.6 x10(3)/mcL    Neut # 6.90 2.1 - 9.2 x10(3)/mcL    Mono # 0.33 0.1 - 1.3 x10(3)/mcL    Eos # 0.00 0 - 0.9 x10(3)/mcL    Baso # 0.01 <=0.2 x10(3)/mcL    IG# 0.04 0 - 0.04 x10(3)/mcL    IG% 0.4 %   POCT glucose    Collection Time: 11/27/23  5:16 AM   Result Value Ref Range    POCT Glucose 109 70 - 110 mg/dL   Echo Saline Bubble? No    Collection Time: 11/27/23  6:38 PM   Result Value Ref Range    BSA 2.33 m2    EF 65 %    LVIDd 5.0 3.5 - 6.0 cm    LVIDs 3.6 2.1 - 4.0 cm    IVS 0.9 0.6 - 1.1 cm    FS 28 28 - 44 %    Left Ventricle Relative Wall Thickness 0.36 cm    Posterior Wall 0.9 0.6 - 1.1 cm    LV mass 158.21 g    LV Mass Index 72 g/m2    ZLVIDS -1.76     ZLVIDD -3.92    POCT glucose    Collection Time: 11/28/23  5:16 AM   Result Value Ref Range    POCT Glucose 112 (H) 70 - 110 mg/dL       Telemetry:  Afib  CVR    Physical Exam  Vitals reviewed.   Constitutional:       Appearance: Normal appearance. She is obese.   HENT:      Head: Normocephalic and atraumatic.   Eyes:      Conjunctiva/sclera: Conjunctivae normal.      Pupils: Pupils are equal, round, and reactive to light.   Cardiovascular:      Rate and Rhythm: Normal rate. Rhythm irregularly irregular.      Pulses:           Dorsalis pedis pulses are 2+ on the right side and 2+ on the left side.        Posterior tibial pulses are 2+ on the right side and 2+ on the left side.   Pulmonary:      Effort: Pulmonary effort is normal.      Breath sounds: Examination of the right-upper field reveals wheezing. Examination of the left-upper field reveals wheezing. Examination of the right-lower field reveals wheezing. Examination of the left-lower field reveals wheezing. Wheezing present.      Comments: O2 96% on RA  Musculoskeletal:      Cervical back: Normal range of motion and neck supple.      Right lower leg: No edema.      Left lower leg: No edema.   Skin:     General: Skin is warm and dry.   Neurological:      General: No focal deficit present.      Mental Status: She is alert and oriented to person, place, and time.   Psychiatric:         Mood and Affect: Mood normal.         Behavior: Behavior normal.         Thought Content: Thought content normal.         Judgment: Judgment normal.         Current Inpatient Medications:    Current Facility-Administered Medications:     acetaminophen tablet 650 mg, 650 mg, Oral, Q4H PRN, Reyes, Thairy G, DO, 650 mg at 11/27/23 2200    albuterol-ipratropium 2.5 mg-0.5 mg/3 mL nebulizer solution 3 mL, 3 mL, Nebulization, Q4H PRN, Reyes, Thairy G, DO    aluminum-magnesium hydroxide-simethicone 200-200-20 mg/5 mL suspension 30 mL, 30 mL, Oral, QID PRN, Reyes, Thairy G, DO    benzonatate capsule 200 mg, 200 mg, Oral, TID, Reyes, Thairy G, DO, 200 mg at 11/27/23 2110    bisacodyL suppository 10 mg, 10 mg, Rectal, Daily PRN, Reyes, Thairy  G, DO    cloNIDine tablet 0.1 mg, 0.1 mg, Oral, Q4H PRN, Cristian Rush MD, 0.1 mg at 11/25/23 2345    dextromethorphan-guaiFENesin  mg/5 ml liquid 10 mL, 10 mL, Oral, Q4H PRN, Cristian Rush MD, 10 mL at 11/28/23 0509    dextrose 10% bolus 125 mL 125 mL, 12.5 g, Intravenous, PRN, Cristian Rush MD    dextrose 10% bolus 250 mL 250 mL, 25 g, Intravenous, PRN, Cristian Rush MD    diltiaZEM (CARDIZEM) 125 mg in dextrose 5 % (D5W) 125 mL infusion, 5 mg/hr, Intravenous, Continuous, Laura Wong, LORRAINE    famotidine tablet 20 mg, 20 mg, Oral, BID, Cristian Rush MD, 20 mg at 11/27/23 2111    furosemide tablet 40 mg, 40 mg, Oral, Daily, Reyes, Thairy G, DO, 40 mg at 11/27/23 0942    gabapentin capsule 100 mg, 100 mg, Oral, Daily, ReyesSumi palma G, DO, 100 mg at 11/27/23 0942    gabapentin capsule 200 mg, 200 mg, Oral, QHS, Reyes, Thairy G, DO, 200 mg at 11/27/23 2110    levalbuterol nebulizer solution 1.25 mg, 1.25 mg, Nebulization, TID, Syd Oleary MD, 1.25 mg at 11/27/23 2151    levothyroxine tablet 88 mcg, 88 mcg, Oral, Daily, Reyes, Thairy G, DO, 88 mcg at 11/27/23 0942    melatonin tablet 9 mg, 9 mg, Oral, Nightly PRN, Reyes, Thairy G, DO, 9 mg at 11/27/23 2308    metoprolol succinate (TOPROL-XL) 24 hr tablet 50 mg, 50 mg, Oral, Daily, Reyes, Thairy G, DO, 50 mg at 11/27/23 0942    multivitamin tablet, 1 tablet, Oral, Daily, Reyes, Thairy G, DO, 1 tablet at 11/27/23 0942    naloxone 0.4 mg/mL injection 0.02 mg, 0.02 mg, Intravenous, PRN, Reyes, Thairy G, DO    ondansetron disintegrating tablet 8 mg, 8 mg, Oral, Q8H PRN, Cristian Rush MD    ondansetron injection 4 mg, 4 mg, Intravenous, Q8H PRN, Reyes, Thairy G, DO    polyethylene glycol packet 17 g, 17 g, Oral, TID PRN, Reyes, Thairy G, DO    predniSONE tablet 50 mg, 50 mg, Oral, Daily, Reyes, Thairy G, DO, 50 mg at 11/27/23 0941    rivaroxaban tablet 20 mg, 20 mg, Oral, Daily, Reyes, Thairy G, DO, 20 mg at 11/27/23 1644    simethicone  chewable tablet 80 mg, 1 tablet, Oral, QID PRN, Reyes, Thairy G, DO    sodium chloride 0.9% flush 10 mL, 10 mL, Intravenous, PRN, Reyes, Thairy G, DO    thiamine tablet 100 mg, 100 mg, Oral, Daily, Reyes, Thairy G, DO, 100 mg at 11/27/23 1000    traMADoL tablet 50 mg, 50 mg, Oral, Q6H PRN, Cristian Rush MD, 50 mg at 11/28/23 0518    VTE Risk Mitigation (From admission, onward)           Ordered     rivaroxaban tablet 20 mg  Daily         11/26/23 0659     IP VTE HIGH RISK PATIENT  Once         11/26/23 0659     Reason for No Pharmacological VTE Prophylaxis  Once        Question:  Reasons:  Answer:  Already adequately anticoagulated on oral Anticoagulants    11/26/23 0659     Place sequential compression device  Until discontinued         11/25/23 2302                    Assessment:   Paroxysmal atrial fibrillation with RVR  --on Xarelto 20 mg po daily  --CHA2D2 VASc=3 points  ---130, Cardizem 15 mg IVP administer 11.28.23 with HR controlled, now 90's  --Cardizem gtt not required  --Transfer cancelled by attending due to stabilization of VS  RSV Bronchiolitis  CLAY  HTN      Plan:   Change to metoprolol tartrate 50 mg po BID  Continue Xarelto 20 mg po daily  Follow-up with Dr. Jama in 3-5 days post discharge  Will follow from [place], please contact [facility] if further assistance is needed in patient's cardiac care.      Laura Wong, LORRAINE  Cardiology  [facility]  11/28/2023

## 2023-11-28 NOTE — PT/OT/SLP EVAL
Occupational Therapy   Evaluation and Discharge Note    Name: Vesta Lala  MRN: [id number]  Admitting Diagnosis: Acute bronchitis due to respiratory syncytial virus (RSV)  Recent Surgery: * No surgery found *      Recommendations:     Discharge Recommendations: No Therapy Indicated  Discharge Equipment Recommendations: none  Barriers to discharge:  None    Assessment:     Vesta Lala is a 78 y.o. female with a medical diagnosis of Acute bronchitis due to respiratory syncytial virus (RSV). At this time, patient is functioning at their prior level of function and does not require further acute OT services.     Plan:     During this hospitalization, patient does not require further acute OT services.  Please re-consult if situation changes.    Plan of Care Reviewed with: patient, daughter    Subjective     Chief Complaint: pain in low back  Patient/Family Comments/goals: Return home    Occupational Profile:  Living Environment: lives alone in SS home with ramp to enter; tub/shower combo   Previous level of function: Independent in ADLs/IADLs  Roles and Routines: driving, cooking, cleaning, etc  Equipment Used at home: bedside commode, walker, rolling, cane, quad  Assistance upon Discharge: family     Pain/Comfort:  Pain Rating 1: 4/10  Location - Orientation 1: lower  Location 1: back  Pain Addressed 1: Cessation of Activity    Patients cultural, spiritual, Roman Catholic conflicts given the current situation: no    Objective:     Communicated with: RN prior to session.  Patient found supine with bed alarm, telemetry, oxygen upon OT entry to room.    General Precautions: Standard, droplet  Orthopedic Precautions: N/A  Braces: N/A  Respiratory Status: Nasal cannula, flow 2 L/min  - RN rounded during evaluation who states can try room air. Pt remained 94-95% on room air following gait.     Occupational Performance:    Bed Mobility:    Patient completed Supine to Sit with independence    Functional  Mobility/Transfers:  Patient completed Sit <> Stand Transfer with independence   Patient completed Bed <> Chair Transfer using Stand Pivot technique with modified independence with rolling walker  Functional Mobility: x20 feet with RW in room Mod (I); pt has been taking herself to the bathroom without RW     Activities of Daily Living:  Feeding:  independence    Grooming: independence    Bathing: modified independence    Upper Body Dressing: independence    Lower Body Dressing: independence    Toileting: independence      Cognitive/Visual Perceptual:  Cognitive/Psychosocial Skills:     -       Oriented to: Person, Place, Time, and Situation     Physical Exam:  Upper Extremity Range of Motion:     -       Right Upper Extremity: WFL  -       Left Upper Extremity: WFL  Upper Extremity Strength: -       Right Upper Extremity: WFL  -       Left Upper Extremity: WFL    AMPAC 6 Click ADL:  AMPAC Total Score: 24    Patient left up in chair with call button in reach and RN and daughter present      History:     Past Medical History:   Diagnosis Date    High cholesterol     Hypertension     Internal hemorrhoids 07/2023    Paroxysmal atrial fibrillation          Past Surgical History:   Procedure Laterality Date    CARPAL TUNNEL RELEASE      CHOLECYSTECTOMY      GASTRIC BYPASS      hysterectomy  1990    JOINT REPLACEMENT      TONSILLECTOMY         Time Tracking:     OT Date of Treatment: 11/28/23  OT Start Time: 0856  OT Stop Time: 0912  OT Total Time (min): 16 min    Billable Minutes:Evaluation 16 min    11/28/2023

## 2023-11-28 NOTE — PT/OT/SLP EVAL
Physical Therapy Obs Evaluation  Only    Patient Name: Vesta Lala   MRN: [id number]  Recent Surgery: * No surgery found *      Recommendations:     Discharge Recommendations:   Home with family support  Discharge Equipment Recommendations: none   Barriers to discharge: Ongoing medical treatment    Assessment:     Vesta Lala is a 78 y.o. female admitted with a medical diagnosis of Acute bronchitis due to respiratory syncytial virus (RSV). She presents with the following impairments/functional limitations:  . None    Rehab Prognosis: patient does not warrant PT services at this time. Pt is I/MOD I    Plan:     During this hospitalization, patient will not be seen by PT    Subjective     Chief Complaint: Confusion on transfer vs no transfer vs [facility] consult. Not clear communication   Patient Comments/Goals: be able to go home once HR is stable.   Pain/Comfort:  Pain Rating 1: 0/10  Pain Rating Post-Intervention 1: 0/10    Social History:  Living Environment: Patient lives alone in a single story home  Prior Level of Function: Prior to admission, patient was modified independent  Equipment Used at Home: wheelchair, walker, rolling, bedside commode  DME owned (not currently used): wheelchair  Assistance Upon Discharge: family    Objective:     Communicated with Nursing and OT prior to session. Patient found HOB elevated with oxygen, telemetry upon PT entry to room.    General Precautions: Standard, droplet   Orthopedic Precautions:     Braces:      Respiratory Status: Room air (2L via NC at night due to sleep apnea)     Exams:  Cognition: Patient is oriented to Person, Place, Time, Situation  RLE ROM: WNL  RLE Strength: WNL  LLE ROM: WNL  LLE Strength: WNL      Functional Mobility:  Gait belt applied - No  Bed Mobility  Rolling Left: modified independence  Rolling Right: modified independence  Supine<>sit: modified [place]   Transfers  Sit to Stand: modified independence with rolling walker  Bed to Chair:  modified independence with rolling walker using Stand Pivot  Gait  Patient ambulated 30 feet in room with rolling walker and modified independence. Patient demonstrates  normal ambulation . O2 sats on room air 95% (HR ranged from ).   Balance  Sitting: independence  Standing: modified independence      Therapeutic Activities and Exercises:   Patient educated on role of acute care PT and PT POC, safety while in hospital including calling nurse for mobility, and call light usage  Nursing in room, witnessed functional levels  Pt is ok to get up on her own.     AM-PAC 6 CLICK MOBILITY  Total Score:23    Patient left HOB elevated with all lines intact, call button in reach, RN notified, and family present.    GOALS:   Multidisciplinary Problems       Physical Therapy Goals       Not on file                    History:     Past Medical History:   Diagnosis Date    High cholesterol     Hypertension     Internal hemorrhoids 07/2023    Paroxysmal atrial fibrillation        Past Surgical History:   Procedure Laterality Date    CARPAL TUNNEL RELEASE      CHOLECYSTECTOMY      GASTRIC BYPASS      hysterectomy  1990    JOINT REPLACEMENT      TONSILLECTOMY         Time Tracking:     PT Received On: 11/28/23  PT Start Time: 0856  PT Stop Time: 0912  PT Total Time (min): 16 min     Billable Minutes: Evaluation low complexity     11/28/2023

## 2023-11-29 LAB — POCT GLUCOSE: 105 MG/DL (ref 70–110)

## 2023-11-29 PROCEDURE — G0378 HOSPITAL OBSERVATION PER HR: HCPCS

## 2023-11-29 PROCEDURE — 94640 AIRWAY INHALATION TREATMENT: CPT

## 2023-11-29 PROCEDURE — 25000003 PHARM REV CODE 250: Performed by: SPECIALIST

## 2023-11-29 PROCEDURE — 25000003 PHARM REV CODE 250: Performed by: NURSE PRACTITIONER

## 2023-11-29 PROCEDURE — 25000003 PHARM REV CODE 250: Performed by: STUDENT IN AN ORGANIZED HEALTH CARE EDUCATION/TRAINING PROGRAM

## 2023-11-29 PROCEDURE — 25000003 PHARM REV CODE 250: Performed by: INTERNAL MEDICINE

## 2023-11-29 PROCEDURE — 94660 CPAP INITIATION&MGMT: CPT

## 2023-11-29 PROCEDURE — 94760 N-INVAS EAR/PLS OXIMETRY 1: CPT

## 2023-11-29 PROCEDURE — 27000221 HC OXYGEN, UP TO 24 HOURS

## 2023-11-29 PROCEDURE — 99900035 HC TECH TIME PER 15 MIN (STAT)

## 2023-11-29 PROCEDURE — 25000242 PHARM REV CODE 250 ALT 637 W/ HCPCS: Performed by: INTERNAL MEDICINE

## 2023-11-29 PROCEDURE — 63600175 PHARM REV CODE 636 W HCPCS: Performed by: INTERNAL MEDICINE

## 2023-11-29 RX ORDER — PREDNISONE 20 MG/1
20 TABLET ORAL DAILY
Status: COMPLETED | OUTPATIENT
Start: 2023-11-29 | End: 2023-11-30

## 2023-11-29 RX ORDER — DILTIAZEM HYDROCHLORIDE 30 MG/1
30 TABLET, FILM COATED ORAL EVERY 6 HOURS
Status: DISCONTINUED | OUTPATIENT
Start: 2023-11-29 | End: 2023-12-03 | Stop reason: HOSPADM

## 2023-11-29 RX ADMIN — BENZONATATE 200 MG: 100 CAPSULE ORAL at 02:11

## 2023-11-29 RX ADMIN — MULTIPLE VITAMINS W/ MINERALS TAB 1 TABLET: TAB at 08:11

## 2023-11-29 RX ADMIN — RIVAROXABAN 20 MG: 20 TABLET, FILM COATED ORAL at 05:11

## 2023-11-29 RX ADMIN — BENZONATATE 200 MG: 100 CAPSULE ORAL at 08:11

## 2023-11-29 RX ADMIN — LEVOTHYROXINE SODIUM 88 MCG: 88 TABLET ORAL at 08:11

## 2023-11-29 RX ADMIN — POLYETHYLENE GLYCOL 3350 17 G: 17 POWDER, FOR SOLUTION ORAL at 06:11

## 2023-11-29 RX ADMIN — GUAIFENESIN AND DEXTROMETHORPHAN 10 ML: 100; 10 SYRUP ORAL at 05:11

## 2023-11-29 RX ADMIN — LEVALBUTEROL HYDROCHLORIDE 1.25 MG: 1.25 SOLUTION RESPIRATORY (INHALATION) at 09:11

## 2023-11-29 RX ADMIN — ONDANSETRON 8 MG: 4 TABLET, ORALLY DISINTEGRATING ORAL at 06:11

## 2023-11-29 RX ADMIN — LEVALBUTEROL HYDROCHLORIDE 1.25 MG: 1.25 SOLUTION RESPIRATORY (INHALATION) at 02:11

## 2023-11-29 RX ADMIN — MELATONIN TAB 3 MG 9 MG: 3 TAB at 08:11

## 2023-11-29 RX ADMIN — METOPROLOL TARTRATE 50 MG: 50 TABLET, FILM COATED ORAL at 08:11

## 2023-11-29 RX ADMIN — GABAPENTIN 100 MG: 100 CAPSULE ORAL at 08:11

## 2023-11-29 RX ADMIN — DILTIAZEM HYDROCHLORIDE 30 MG: 30 TABLET, FILM COATED ORAL at 11:11

## 2023-11-29 RX ADMIN — PREDNISONE 20 MG: 20 TABLET ORAL at 08:11

## 2023-11-29 RX ADMIN — GUAIFENESIN AND DEXTROMETHORPHAN 10 ML: 100; 10 SYRUP ORAL at 11:11

## 2023-11-29 RX ADMIN — FUROSEMIDE 40 MG: 40 TABLET ORAL at 08:11

## 2023-11-29 RX ADMIN — DILTIAZEM HYDROCHLORIDE 30 MG: 30 TABLET, FILM COATED ORAL at 05:11

## 2023-11-29 RX ADMIN — DILTIAZEM HYDROCHLORIDE 30 MG: 30 TABLET, FILM COATED ORAL at 08:11

## 2023-11-29 RX ADMIN — FAMOTIDINE 20 MG: 20 TABLET ORAL at 08:11

## 2023-11-29 RX ADMIN — ALUMINUM HYDROXIDE, MAGNESIUM HYDROXIDE, AND SIMETHICONE 30 ML: 1200; 120; 1200 SUSPENSION ORAL at 05:11

## 2023-11-29 RX ADMIN — TRAMADOL HYDROCHLORIDE 50 MG: 50 TABLET, COATED ORAL at 11:11

## 2023-11-29 RX ADMIN — GABAPENTIN 200 MG: 100 CAPSULE ORAL at 08:11

## 2023-11-29 NOTE — PLAN OF CARE
Problem: Adult Inpatient Plan of Care  Goal: Plan of Care Review  Outcome: Ongoing, Progressing  Flowsheets (Taken 11/29/2023 0106)  Plan of Care Reviewed With: patient  Goal: Patient-Specific Goal (Individualized)  Outcome: Ongoing, Progressing  Flowsheets (Taken 11/29/2023 0106)  Anxieties, Fears or Concerns: none  Individualized Care Needs: tele monitoring droplet precautions maintained safety maintained     Problem: Infection  Goal: Absence of Infection Signs and Symptoms  Outcome: Ongoing, Progressing  Intervention: Prevent or Manage Infection  Flowsheets (Taken 11/29/2023 0106)  Isolation Precautions:   droplet   precautions maintained     Problem: Dysrhythmia  Goal: Normalized Cardiac Rhythm  Outcome: Ongoing, Progressing  Intervention: Monitor and Manage Cardiac Rhythm Effect  Flowsheets (Taken 11/29/2023 0106)  VTE Prevention/Management:   bleeding precations maintained   bleeding risk assessed

## 2023-11-29 NOTE — PROGRESS NOTES
[facility] MEDICINE ~ PROGRESS NOTE        CHIEF COMPLAINT   Hospital follow up    HOSPITAL COURSE   77 yo female with PMH of HTN, spinal stenosis, HLD, CLAY (has not had a functioning CPAP for 6 months), AF and gastric sleeve who presents with complaint of wheezing and cough that started 6 days ago. She was prescribed a Z-johnson and an inhaler and completed the course. Symptoms did not improve so she followed up at a walk in clinic on Friday where she tested + for RSV. She was given a steroid shot but symptoms continued to worsen and she presented to our ED. At baseline patient lives alone and ambulates with a walker.    During the hospital stay she had developed atrial fibrillation with RVR, she has a known history of atrial fibrillation, Cardiology was consulted.    Today  Seen and examined this morning.  Cardiology will be not be seeing her today as they are out of town today.  I have started her on oral Cardizem.  Poor appetite.        OBJECTIVE/PHYSICAL EXAM     VITAL SIGNS (MOST RECENT):  Temp: 97.8 °F (36.6 °C) (11/29/23 0450)  Pulse: 103 (11/29/23 0450)  Resp: 18 (11/29/23 0450)  BP: 124/73 (11/29/23 0829)  SpO2: 97 % (11/29/23 0450) VITAL SIGNS (24 HOUR RANGE):  Temp:  [97.8 °F (36.6 °C)-98.1 °F (36.7 °C)] 97.8 °F (36.6 °C)  Pulse:  [103-146] 103  Resp:  [16-20] 18  SpO2:  [96 %-98 %] 97 %  BP: (115-134)/(73-81) 124/73   GENERAL: In no acute distress, afebrile  HEENT:  CHEST:  Bilateral wheezing  HEART: S1, S2, no appreciable murmur  ABDOMEN: Soft, nontender, BS +  MSK: Warm, no lower extremity edema, no clubbing or cyanosis  NEUROLOGIC: Alert and oriented x4, moving all extremities with good strength   INTEGUMENTARY:  PSYCHIATRY:        ASSESSMENT/PLAN   RSV bronchiolitis   Atrial fibrillation with RVR  Obstructive sleep apnea     History of: HTN, atrial fibrillation, Bariatric surgery      Xopenex 3 times daily.  Prednisone 20 daily.    Metoprolol 50 b.i.d., Xarelto  20.  Metoprolol IV push PRN.  Start oral Cardizem 30 mg every 6 hours and transitioned to extended release tomorrow if heart rate controlled    DVT prophylaxis:  Xarelto 20    Anticipated discharge and disposition:  Plan to discharge tomorrow if heart rate controlled and switch to extended-release Cardizem.  Wheezing will persist for some time with RSV.  __________________________________________________________________________    LABS/MICRO/MEDS/DIAGNOSTICS       LABS  Recent Labs     11/27/23  0338      K 3.8   CHLORIDE 107   CO2 25   BUN 19.2   CREATININE 0.69   GLUCOSE 142*   CALCIUM 8.8       Recent Labs     11/27/23  0338   WBC 9.05   RBC 4.09*   HCT 36.5*   MCV 89.2            MICROBIOLOGY  Microbiology Results (last 7 days)       ** No results found for the last 168 hours. **               MEDICATIONS   benzonatate  200 mg Oral TID    diltiaZEM  30 mg Oral Q6H    famotidine  20 mg Oral BID    furosemide  40 mg Oral Daily    gabapentin  100 mg Oral Daily    gabapentin  200 mg Oral QHS    levalbuterol  1.25 mg Nebulization TID    levothyroxine  88 mcg Oral Daily    metoprolol tartrate  50 mg Oral BID    multivitamin  1 tablet Oral Daily    predniSONE  20 mg Oral Daily    rivaroxaban  20 mg Oral Daily         INFUSIONS           DIAGNOSTIC TESTS  X-Ray Chest AP Portable   ED Interpretation   No acute cardiopulmonary process      Final Result      No acute abnormality of the chest.         Electronically signed by: Columba Benson   Date:    11/26/2023   Time:    13:14           EF   Date Value Ref Range Status   11/27/2023 65 % Final          NUTRITION STATUS  Patient meets ASPEN criteria for   malnutrition of   per RD assessment as evidenced by:                       A minimum of two characteristics is recommended for diagnosis of either severe or non-severe malnutrition.       Case related differential diagnoses have been reviewed; assessment and plan has been documented. I have personally  reviewed the labs and test results that are currently available; I have reviewed the patients medication list. I have reviewed the consulting providers recommendations. I have reviewed or attempted to review medical records based upon their availability.  All of the patient's and/or family's questions have been addressed and answered to the best of my ability.  I will continue to monitor closely and make adjustments to medical management as needed.  This document was created using M*Modal Fluency Direct.  Transcription errors may have been made.  Please contact me if any questions may rise regarding documentation to clarify transcription.        Syd Oleary MD   Internal Medicine  Department of Hospital Medicine [facility]

## 2023-11-29 NOTE — PLAN OF CARE
Problem: Adult Inpatient Plan of Care  Goal: Plan of Care Review  Outcome: Ongoing, Progressing  Flowsheets (Taken 11/29/2023 0910)  Plan of Care Reviewed With:   patient   daughter  Goal: Patient-Specific Goal (Individualized)  Outcome: Ongoing, Progressing  Flowsheets (Taken 11/29/2023 0910)  Anxieties, Fears or Concerns: none  Individualized Care Needs: Tele monitoring, control of heart rate, maintain safety  Goal: Absence of Hospital-Acquired Illness or Injury  Outcome: Ongoing, Progressing  Intervention: Identify and Manage Fall Risk  Flowsheets (Taken 11/29/2023 0910)  Safety Promotion/Fall Prevention:   assistive device/personal item within reach   bed alarm set   lighting adjusted   medications reviewed   nonskid shoes/socks when out of bed   side rails raised x 3   instructed to call staff for mobility  Intervention: Prevent Skin Injury  Flowsheets (Taken 11/29/2023 0910)  Body Position:   position changed independently   supine   sitting up in bed  Skin Protection:   adhesive use limited   incontinence pads utilized   tubing/devices free from skin contact  Intervention: Prevent and Manage VTE (Venous Thromboembolism) Risk  Flowsheets (Taken 11/29/2023 0910)  Activity Management:   Up to bedside commode - L3   Rolling - L1  VTE Prevention/Management: bleeding precations maintained  Intervention: Prevent Infection  Flowsheets (Taken 11/29/2023 0910)  Infection Prevention:   cohorting utilized   environmental surveillance performed   equipment surfaces disinfected   hand hygiene promoted   single patient room provided  Goal: Optimal Comfort and Wellbeing  Outcome: Ongoing, Progressing  Intervention: Monitor Pain and Promote Comfort  Flowsheets (Taken 11/29/2023 0910)  Pain Management Interventions: care clustered  Intervention: Provide Person-Centered Care  Flowsheets (Taken 11/29/2023 0910)  Trust Relationship/Rapport:   care explained   questions encouraged   questions answered  Goal: Readiness for  Transition of Care  Outcome: Ongoing, Progressing     Problem: Bariatric Environmental Safety  Goal: Safety Maintained with Care  Outcome: Ongoing, Progressing     Problem: Diabetes Comorbidity  Goal: Blood Glucose Level Within Targeted Range  Outcome: Ongoing, Progressing  Intervention: Monitor and Manage Glycemia  Flowsheets (Taken 11/29/2023 0910)  Glycemic Management: blood glucose monitored     Problem: Infection  Goal: Absence of Infection Signs and Symptoms  Outcome: Ongoing, Progressing  Intervention: Prevent or Manage Infection  Flowsheets (Taken 11/29/2023 0910)  Infection Management: aseptic technique maintained     Problem: Dysrhythmia  Goal: Normalized Cardiac Rhythm  Outcome: Ongoing, Progressing  Intervention: Monitor and Manage Cardiac Rhythm Effect  Flowsheets (Taken 11/29/2023 0910)  VTE Prevention/Management: bleeding precations maintained

## 2023-11-30 LAB — POCT GLUCOSE: 99 MG/DL (ref 70–110)

## 2023-11-30 PROCEDURE — 25000003 PHARM REV CODE 250: Performed by: SPECIALIST

## 2023-11-30 PROCEDURE — 63600175 PHARM REV CODE 636 W HCPCS: Performed by: INTERNAL MEDICINE

## 2023-11-30 PROCEDURE — 25000003 PHARM REV CODE 250: Performed by: NURSE PRACTITIONER

## 2023-11-30 PROCEDURE — 25000003 PHARM REV CODE 250: Performed by: INTERNAL MEDICINE

## 2023-11-30 PROCEDURE — 27000221 HC OXYGEN, UP TO 24 HOURS

## 2023-11-30 PROCEDURE — 94660 CPAP INITIATION&MGMT: CPT

## 2023-11-30 PROCEDURE — 25000003 PHARM REV CODE 250: Performed by: STUDENT IN AN ORGANIZED HEALTH CARE EDUCATION/TRAINING PROGRAM

## 2023-11-30 PROCEDURE — 94640 AIRWAY INHALATION TREATMENT: CPT | Mod: XB

## 2023-11-30 PROCEDURE — G0378 HOSPITAL OBSERVATION PER HR: HCPCS

## 2023-11-30 PROCEDURE — 94760 N-INVAS EAR/PLS OXIMETRY 1: CPT

## 2023-11-30 PROCEDURE — 99900035 HC TECH TIME PER 15 MIN (STAT)

## 2023-11-30 PROCEDURE — 25000242 PHARM REV CODE 250 ALT 637 W/ HCPCS: Performed by: INTERNAL MEDICINE

## 2023-11-30 PROCEDURE — 63700000 PHARM REV CODE 250 ALT 637 W/O HCPCS: Performed by: INTERNAL MEDICINE

## 2023-11-30 RX ORDER — NYSTATIN 100000 [USP'U]/ML
500000 SUSPENSION ORAL 4 TIMES DAILY
Status: DISCONTINUED | OUTPATIENT
Start: 2023-11-30 | End: 2023-12-03 | Stop reason: HOSPADM

## 2023-11-30 RX ORDER — FLUCONAZOLE 100 MG/1
100 TABLET ORAL DAILY
Status: DISCONTINUED | OUTPATIENT
Start: 2023-11-30 | End: 2023-12-03 | Stop reason: HOSPADM

## 2023-11-30 RX ADMIN — NYSTATIN 500000 UNITS: 100000 SUSPENSION ORAL at 09:11

## 2023-11-30 RX ADMIN — LEVOTHYROXINE SODIUM 88 MCG: 88 TABLET ORAL at 08:11

## 2023-11-30 RX ADMIN — DILTIAZEM HYDROCHLORIDE 30 MG: 30 TABLET, FILM COATED ORAL at 05:11

## 2023-11-30 RX ADMIN — LEVALBUTEROL HYDROCHLORIDE 1.25 MG: 1.25 SOLUTION RESPIRATORY (INHALATION) at 01:11

## 2023-11-30 RX ADMIN — GABAPENTIN 200 MG: 100 CAPSULE ORAL at 09:11

## 2023-11-30 RX ADMIN — NYSTATIN 500000 UNITS: 100000 SUSPENSION ORAL at 01:11

## 2023-11-30 RX ADMIN — LEVALBUTEROL HYDROCHLORIDE 1.25 MG: 1.25 SOLUTION RESPIRATORY (INHALATION) at 07:11

## 2023-11-30 RX ADMIN — FAMOTIDINE 20 MG: 20 TABLET ORAL at 09:11

## 2023-11-30 RX ADMIN — BENZONATATE 200 MG: 100 CAPSULE ORAL at 09:11

## 2023-11-30 RX ADMIN — FLUCONAZOLE 100 MG: 100 TABLET ORAL at 09:11

## 2023-11-30 RX ADMIN — NYSTATIN 500000 UNITS: 100000 SUSPENSION ORAL at 08:11

## 2023-11-30 RX ADMIN — FUROSEMIDE 40 MG: 40 TABLET ORAL at 08:11

## 2023-11-30 RX ADMIN — MELATONIN TAB 3 MG 9 MG: 3 TAB at 09:11

## 2023-11-30 RX ADMIN — NYSTATIN 500000 UNITS: 100000 SUSPENSION ORAL at 05:11

## 2023-11-30 RX ADMIN — METOPROLOL TARTRATE 50 MG: 50 TABLET, FILM COATED ORAL at 09:11

## 2023-11-30 RX ADMIN — FAMOTIDINE 20 MG: 20 TABLET ORAL at 08:11

## 2023-11-30 RX ADMIN — PREDNISONE 20 MG: 20 TABLET ORAL at 08:11

## 2023-11-30 RX ADMIN — RIVAROXABAN 20 MG: 20 TABLET, FILM COATED ORAL at 05:11

## 2023-11-30 RX ADMIN — DILTIAZEM HYDROCHLORIDE 30 MG: 30 TABLET, FILM COATED ORAL at 11:11

## 2023-11-30 RX ADMIN — MULTIPLE VITAMINS W/ MINERALS TAB 1 TABLET: TAB at 08:11

## 2023-11-30 RX ADMIN — BENZONATATE 200 MG: 100 CAPSULE ORAL at 02:11

## 2023-11-30 RX ADMIN — BENZONATATE 200 MG: 100 CAPSULE ORAL at 08:11

## 2023-11-30 RX ADMIN — GABAPENTIN 100 MG: 100 CAPSULE ORAL at 08:11

## 2023-11-30 RX ADMIN — METOPROLOL TARTRATE 50 MG: 50 TABLET, FILM COATED ORAL at 08:11

## 2023-11-30 NOTE — PLAN OF CARE
[facility]  Discharge Reassessment    Primary Care Provider: Stephanie Sotelo MD    Expected Discharge Date:     Reassessment (most recent)       Discharge Reassessment - 11/29/23 1809          Discharge Reassessment    Assessment Type Discharge Planning Reassessment     Did the patient's condition or plan change since previous assessment? No     Discharge Plan discussed with: Adult children     Communicated STONE with patient/caregiver Date not available/Unable to determine     Discharge Plan A Home with family;Home Health     DME Needed Upon Discharge  none     Transition of Care Barriers None     Why the patient remains in the hospital Requires continued medical care        Post-Acute Status    Post-Acute Authorization Home Health     Home Health Status Pending medical clearance/testing     Hospital Resources/Appts/Education Provided Provided patient/caregiver with written discharge plan information     Discharge Delays None known at this time

## 2023-11-30 NOTE — PLAN OF CARE
Problem: Adult Inpatient Plan of Care  Goal: Plan of Care Review  Outcome: Ongoing, Progressing  Flowsheets (Taken 11/29/2023 1958)  Plan of Care Reviewed With: patient  Goal: Patient-Specific Goal (Individualized)  Outcome: Ongoing, Progressing  Flowsheets (Taken 11/29/2023 1958)  Anxieties, Fears or Concerns: none  Individualized Care Needs: tele monitoring safety maintained droplet isolation     Problem: Infection  Goal: Absence of Infection Signs and Symptoms  Outcome: Ongoing, Progressing  Intervention: Prevent or Manage Infection  Flowsheets (Taken 11/29/2023 1958)  Isolation Precautions: droplet     Problem: Dysrhythmia  Goal: Normalized Cardiac Rhythm  Outcome: Ongoing, Progressing  Intervention: Monitor and Manage Cardiac Rhythm Effect  Flowsheets (Taken 11/29/2023 1958)  VTE Prevention/Management:   bleeding risk assessed   bleeding precations maintained

## 2023-11-30 NOTE — PLAN OF CARE
Problem: Adult Inpatient Plan of Care  Goal: Plan of Care Review  Outcome: Ongoing, Progressing  Flowsheets (Taken 11/30/2023 1301)  Plan of Care Reviewed With:   patient   daughter  Goal: Patient-Specific Goal (Individualized)  Outcome: Ongoing, Progressing  Goal: Absence of Hospital-Acquired Illness or Injury  Outcome: Ongoing, Progressing  Intervention: Identify and Manage Fall Risk  Flowsheets (Taken 11/30/2023 1301)  Safety Promotion/Fall Prevention:   nonskid shoes/socks when out of bed   medications reviewed   Fall Risk reviewed with patient/family   lighting adjusted   family to remain at bedside   room near unit station  Intervention: Prevent Skin Injury  Flowsheets (Taken 11/30/2023 1301)  Body Position: position changed independently  Skin Protection:   adhesive use limited   incontinence pads utilized  Intervention: Prevent and Manage VTE (Venous Thromboembolism) Risk  Flowsheets (Taken 11/30/2023 1301)  Activity Management:   Arm raise - L1   Ambulated in room - L4   Ambulated to bathroom - L4  VTE Prevention/Management:   bleeding risk assessed   bleeding precations maintained   ambulation promoted  Range of Motion:   active ROM (range of motion) encouraged   ROM (range of motion) performed  Intervention: Prevent Infection  Flowsheets (Taken 11/30/2023 1301)  Infection Prevention:   cohorting utilized   hand hygiene promoted   single patient room provided   equipment surfaces disinfected   rest/sleep promoted  Goal: Optimal Comfort and Wellbeing  Outcome: Ongoing, Progressing  Intervention: Monitor Pain and Promote Comfort  Flowsheets (Taken 11/30/2023 1301)  Pain Management Interventions:   care clustered   quiet environment facilitated   pillow support provided  Intervention: Provide Person-Centered Care  Flowsheets (Taken 11/30/2023 1301)  Trust Relationship/Rapport:   care explained   questions encouraged   reassurance provided   empathic listening provided   questions answered     Problem:  Infection  Goal: Absence of Infection Signs and Symptoms  Outcome: Ongoing, Progressing     Problem: Dysrhythmia  Goal: Normalized Cardiac Rhythm  Outcome: Ongoing, Progressing

## 2023-11-30 NOTE — PROGRESS NOTES
[facility]  [facility] MEDICINE ~ PROGRESS NOTE        CHIEF COMPLAINT   Hospital follow up    HOSPITAL COURSE     77 yo female with PMH of HTN, spinal stenosis, HLD, CLAY (has not had a functioning CPAP for 6 months), AF and gastric sleeve who presents with complaint of wheezing and cough that started 6 days ago. She was prescribed a Z-johnson and an inhaler and completed the course. Symptoms did not improve so she followed up at a walk in clinic on Friday where she tested + for RSV. She was given a steroid shot but symptoms continued to worsen and she presented to our ED. At baseline patient lives alone and ambulates with a walker.    During the hospital stay she had developed atrial fibrillation with RVR, she has a known history of atrial fibrillation, Cardiology was consulted.     Today  Patient with extensive thrush and wheezing. Will add po fluconazole and nystatin. Cont current plan of care, not ready for dc today, possibly tomorrow.           OBJECTIVE/PHYSICAL EXAM     VITAL SIGNS (MOST RECENT):  Temp: 98.4 °F (36.9 °C) (11/29/23 1901)  Pulse: 94 (11/30/23 0834)  Resp: 20 (11/30/23 0705)  BP: 120/74 (11/30/23 0705)  SpO2: (!) 94 % (11/30/23 0705) VITAL SIGNS (24 HOUR RANGE):  Temp:  [98 °F (36.7 °C)-98.4 °F (36.9 °C)] 98.4 °F (36.9 °C)  Pulse:  [] 94  Resp:  [18-22] 20  SpO2:  [93 %-100 %] 94 %  BP: (120-140)/(74-87) 120/74   GENERAL: In no acute distress, afebrile  HEENT:  CHEST: wheezing in all lung fields.   HEART: S1, S2, no appreciable murmur  ABDOMEN: Soft, nontender, BS +  MSK: Warm, no lower extremity edema, no clubbing or cyanosis  NEUROLOGIC: Alert and oriented x4, moving all extremities with good strength   INTEGUMENTARY:  PSYCHIATRY:        ASSESSMENT/PLAN     Active Hospital Problems    Diagnosis  POA    *Acute bronchitis due to respiratory syncytial virus (RSV) [J20.5]  Yes      Resolved Hospital Problems   No resolved problems to display.    RSV bronchiolitis  "  Atrial fibrillation with RVR  Obstructive sleep apnea      History of: HTN, atrial fibrillation, Bariatric surgery        Xopenex 3 times daily.  Prednisone 20 daily.    Metoprolol 50 b.i.d., Xarelto 20.  Metoprolol IV push PRN.  Start oral Cardizem 30 mg every 6 hours and transitioned to extended release tomorrow if heart rate controlled     DVT prophylaxis:  Xarelto 20      Anticipated discharge and disposition: Tomorrow  __________________________________________________________________________    LABS/MICRO/MEDS/DIAGNOSTICS       LABS  No results for input(s): "NA", "K", "CHLORIDE", "CO2", "BUN", "CREATININE", "GLUCOSE", "CALCIUM", "ALKPHOS", "AST", "ALT", "ALBUMIN" in the last 72 hours.  No results for input(s): "WBC", "RBC", "HCT", "MCV", "PLT" in the last 72 hours.    Invalid input(s): "HG"    MICROBIOLOGY  Microbiology Results (last 7 days)       ** No results found for the last 168 hours. **               MEDICATIONS   benzonatate  200 mg Oral TID    diltiaZEM  30 mg Oral Q6H    famotidine  20 mg Oral BID    fluconazole  100 mg Oral Daily    furosemide  40 mg Oral Daily    gabapentin  100 mg Oral Daily    gabapentin  200 mg Oral QHS    levalbuterol  1.25 mg Nebulization TID    levothyroxine  88 mcg Oral Daily    metoprolol tartrate  50 mg Oral BID    multivitamin  1 tablet Oral Daily    nystatin  500,000 Units Oral QID    rivaroxaban  20 mg Oral Daily         INFUSIONS         DIAGNOSTIC TESTS  X-Ray Chest AP Portable   ED Interpretation   No acute cardiopulmonary process      Final Result      No acute abnormality of the chest.         Electronically signed by: Columba Benson   Date:    11/26/2023   Time:    13:14           EF   Date Value Ref Range Status   11/27/2023 65 % Final            Case related differential diagnoses have been reviewed; assessment and plan has been documented. I have personally reviewed the labs and test results that are currently available; I have reviewed the patients " medication list. I have reviewed the consulting providers recommendations. I have reviewed or attempted to review medical records based upon their availability.  All of the patient's and/or family's questions have been addressed and answered to the best of my ability.  I will continue to monitor closely and make adjustments to medical management as needed.  This document was created using M*Modal Fluency Direct.  Transcription errors may have been made.  Please contact me if any questions may rise regarding documentation to clarify transcription.        Lynn Wellington MD   Internal Medicine  Department of Hospital Medicine [facility]

## 2023-11-30 NOTE — NURSING
Nurses Note -- 4 Eyes      11/29/2023   1920 PM      Skin assessed during: Daily Assessment      [x] No Altered Skin Integrity Present    []Prevention Measures Documented      [] Yes- Altered Skin Integrity Present or Discovered   [] LDA Added if Not in Epic (Describe Wound)   [] New Altered Skin Integrity was Present on Admit and Documented in LDA   [] Wound Image Taken    Wound Care Consulted? No    Attending Nurse:  brook Miller RN/Staff Member:   brook

## 2023-12-01 LAB
ALBUMIN SERPL-MCNC: 3 G/DL (ref 3.4–4.8)
ALBUMIN/GLOB SERPL: 1 RATIO (ref 1.1–2)
ALP SERPL-CCNC: 105 UNIT/L (ref 40–150)
ALT SERPL-CCNC: 58 UNIT/L (ref 0–55)
AST SERPL-CCNC: 35 UNIT/L (ref 5–34)
BASOPHILS # BLD AUTO: 0.05 X10(3)/MCL
BASOPHILS NFR BLD AUTO: 0.4 %
BILIRUB SERPL-MCNC: 0.4 MG/DL
BUN SERPL-MCNC: 20.8 MG/DL (ref 9.8–20.1)
CALCIUM SERPL-MCNC: 8.5 MG/DL (ref 8.4–10.2)
CHLORIDE SERPL-SCNC: 100 MMOL/L (ref 98–107)
CO2 SERPL-SCNC: 35 MMOL/L (ref 23–31)
CREAT SERPL-MCNC: 0.94 MG/DL (ref 0.55–1.02)
EOSINOPHIL # BLD AUTO: 0.21 X10(3)/MCL (ref 0–0.9)
EOSINOPHIL NFR BLD AUTO: 1.7 %
ERYTHROCYTE [DISTWIDTH] IN BLOOD BY AUTOMATED COUNT: 15.8 % (ref 11.5–17)
GFR SERPLBLD CREATININE-BSD FMLA CKD-EPI: >60 MLS/MIN/1.73/M2
GLOBULIN SER-MCNC: 3.1 GM/DL (ref 2.4–3.5)
GLUCOSE SERPL-MCNC: 146 MG/DL (ref 82–115)
HCT VFR BLD AUTO: 43.6 % (ref 37–47)
HGB BLD-MCNC: 12.3 G/DL (ref 12–16)
IMM GRANULOCYTES # BLD AUTO: 0.02 X10(3)/MCL (ref 0–0.04)
IMM GRANULOCYTES NFR BLD AUTO: 0.2 %
LYMPHOCYTES # BLD AUTO: 3.97 X10(3)/MCL (ref 0.6–4.6)
LYMPHOCYTES NFR BLD AUTO: 32 %
MCH RBC QN AUTO: 25.4 PG (ref 27–31)
MCHC RBC AUTO-ENTMCNC: 28.2 G/DL (ref 33–36)
MCV RBC AUTO: 89.9 FL (ref 80–94)
MONOCYTES # BLD AUTO: 0.68 X10(3)/MCL (ref 0.1–1.3)
MONOCYTES NFR BLD AUTO: 5.5 %
NEUTROPHILS # BLD AUTO: 7.46 X10(3)/MCL (ref 2.1–9.2)
NEUTROPHILS NFR BLD AUTO: 60.2 %
PLATELET # BLD AUTO: 350 X10(3)/MCL (ref 130–400)
PMV BLD AUTO: 10.3 FL (ref 7.4–10.4)
POCT GLUCOSE: 103 MG/DL (ref 70–110)
POTASSIUM SERPL-SCNC: 3 MMOL/L (ref 3.5–5.1)
PROT SERPL-MCNC: 6.1 GM/DL (ref 5.8–7.6)
RBC # BLD AUTO: 4.85 X10(6)/MCL (ref 4.2–5.4)
SODIUM SERPL-SCNC: 145 MMOL/L (ref 136–145)
TROPONIN I SERPL-MCNC: 0.01 NG/ML (ref 0–0.04)
WBC # SPEC AUTO: 12.39 X10(3)/MCL (ref 4.5–11.5)

## 2023-12-01 PROCEDURE — 80053 COMPREHEN METABOLIC PANEL: CPT | Performed by: INTERNAL MEDICINE

## 2023-12-01 PROCEDURE — 94640 AIRWAY INHALATION TREATMENT: CPT | Mod: XB

## 2023-12-01 PROCEDURE — 25000003 PHARM REV CODE 250: Performed by: SPECIALIST

## 2023-12-01 PROCEDURE — 93005 ELECTROCARDIOGRAM TRACING: CPT

## 2023-12-01 PROCEDURE — 93010 EKG 12-LEAD: ICD-10-PCS | Mod: ,,, | Performed by: STUDENT IN AN ORGANIZED HEALTH CARE EDUCATION/TRAINING PROGRAM

## 2023-12-01 PROCEDURE — 99900035 HC TECH TIME PER 15 MIN (STAT)

## 2023-12-01 PROCEDURE — 63700000 PHARM REV CODE 250 ALT 637 W/O HCPCS: Performed by: INTERNAL MEDICINE

## 2023-12-01 PROCEDURE — 25000003 PHARM REV CODE 250: Performed by: STUDENT IN AN ORGANIZED HEALTH CARE EDUCATION/TRAINING PROGRAM

## 2023-12-01 PROCEDURE — 94660 CPAP INITIATION&MGMT: CPT

## 2023-12-01 PROCEDURE — 94760 N-INVAS EAR/PLS OXIMETRY 1: CPT

## 2023-12-01 PROCEDURE — 25000003 PHARM REV CODE 250: Performed by: NURSE PRACTITIONER

## 2023-12-01 PROCEDURE — 84484 ASSAY OF TROPONIN QUANT: CPT | Performed by: INTERNAL MEDICINE

## 2023-12-01 PROCEDURE — 85025 COMPLETE CBC W/AUTO DIFF WBC: CPT | Performed by: INTERNAL MEDICINE

## 2023-12-01 PROCEDURE — 99900031 HC PATIENT EDUCATION (STAT)

## 2023-12-01 PROCEDURE — G0378 HOSPITAL OBSERVATION PER HR: HCPCS

## 2023-12-01 PROCEDURE — 93010 ELECTROCARDIOGRAM REPORT: CPT | Mod: ,,, | Performed by: STUDENT IN AN ORGANIZED HEALTH CARE EDUCATION/TRAINING PROGRAM

## 2023-12-01 PROCEDURE — 27000221 HC OXYGEN, UP TO 24 HOURS

## 2023-12-01 PROCEDURE — 25000003 PHARM REV CODE 250: Performed by: INTERNAL MEDICINE

## 2023-12-01 PROCEDURE — 25000242 PHARM REV CODE 250 ALT 637 W/ HCPCS: Performed by: INTERNAL MEDICINE

## 2023-12-01 RX ORDER — POTASSIUM CHLORIDE 20 MEQ/1
20 TABLET, EXTENDED RELEASE ORAL DAILY
Status: COMPLETED | OUTPATIENT
Start: 2023-12-02 | End: 2023-12-03

## 2023-12-01 RX ADMIN — NYSTATIN 500000 UNITS: 100000 SUSPENSION ORAL at 05:12

## 2023-12-01 RX ADMIN — NYSTATIN 500000 UNITS: 100000 SUSPENSION ORAL at 10:12

## 2023-12-01 RX ADMIN — FLUCONAZOLE 100 MG: 100 TABLET ORAL at 09:12

## 2023-12-01 RX ADMIN — FAMOTIDINE 20 MG: 20 TABLET ORAL at 09:12

## 2023-12-01 RX ADMIN — LEVALBUTEROL HYDROCHLORIDE 1.25 MG: 1.25 SOLUTION RESPIRATORY (INHALATION) at 09:12

## 2023-12-01 RX ADMIN — DILTIAZEM HYDROCHLORIDE 30 MG: 30 TABLET, FILM COATED ORAL at 05:12

## 2023-12-01 RX ADMIN — MULTIPLE VITAMINS W/ MINERALS TAB 1 TABLET: TAB at 09:12

## 2023-12-01 RX ADMIN — DILTIAZEM HYDROCHLORIDE 30 MG: 30 TABLET, FILM COATED ORAL at 12:12

## 2023-12-01 RX ADMIN — MELATONIN TAB 3 MG 9 MG: 3 TAB at 10:12

## 2023-12-01 RX ADMIN — NYSTATIN 500000 UNITS: 100000 SUSPENSION ORAL at 09:12

## 2023-12-01 RX ADMIN — METOPROLOL TARTRATE 50 MG: 50 TABLET, FILM COATED ORAL at 10:12

## 2023-12-01 RX ADMIN — LEVALBUTEROL HYDROCHLORIDE 1.25 MG: 1.25 SOLUTION RESPIRATORY (INHALATION) at 08:12

## 2023-12-01 RX ADMIN — FAMOTIDINE 20 MG: 20 TABLET ORAL at 10:12

## 2023-12-01 RX ADMIN — BENZONATATE 200 MG: 100 CAPSULE ORAL at 10:12

## 2023-12-01 RX ADMIN — BENZONATATE 200 MG: 100 CAPSULE ORAL at 09:12

## 2023-12-01 RX ADMIN — GABAPENTIN 100 MG: 100 CAPSULE ORAL at 09:12

## 2023-12-01 RX ADMIN — LEVOTHYROXINE SODIUM 88 MCG: 88 TABLET ORAL at 09:12

## 2023-12-01 RX ADMIN — FUROSEMIDE 40 MG: 40 TABLET ORAL at 09:12

## 2023-12-01 RX ADMIN — RIVAROXABAN 20 MG: 20 TABLET, FILM COATED ORAL at 05:12

## 2023-12-01 RX ADMIN — DILTIAZEM HYDROCHLORIDE 30 MG: 30 TABLET, FILM COATED ORAL at 11:12

## 2023-12-01 RX ADMIN — GABAPENTIN 200 MG: 100 CAPSULE ORAL at 10:12

## 2023-12-01 RX ADMIN — METOPROLOL TARTRATE 50 MG: 50 TABLET, FILM COATED ORAL at 09:12

## 2023-12-01 RX ADMIN — LEVALBUTEROL HYDROCHLORIDE 1.25 MG: 1.25 SOLUTION RESPIRATORY (INHALATION) at 01:12

## 2023-12-01 RX ADMIN — BENZONATATE 200 MG: 100 CAPSULE ORAL at 02:12

## 2023-12-01 RX ADMIN — POLYETHYLENE GLYCOL 3350 17 G: 17 POWDER, FOR SOLUTION ORAL at 05:12

## 2023-12-01 RX ADMIN — NYSTATIN 500000 UNITS: 100000 SUSPENSION ORAL at 12:12

## 2023-12-01 NOTE — PLAN OF CARE
Problem: Adult Inpatient Plan of Care  Goal: Plan of Care Review  Outcome: Ongoing, Progressing  Flowsheets (Taken 12/01/2023 1533)  Plan of Care Reviewed With:   patient   daughter  Goal: Patient-Specific Goal (Individualized)  Outcome: Ongoing, Progressing  Flowsheets (Taken 12/01/2023 1533)  Anxieties, Fears or Concerns: none  Individualized Care Needs: Tele monitoring, control of heart rate, maintain safety  Goal: Absence of Hospital-Acquired Illness or Injury  Outcome: Ongoing, Progressing  Intervention: Identify and Manage Fall Risk  Flowsheets (Taken 12/01/2023 1533)  Safety Promotion/Fall Prevention:   assistive device/personal item within reach   bed alarm set   lighting adjusted   medications reviewed   nonskid shoes/socks when out of bed   side rails raised x 3   instructed to call staff for mobility  Intervention: Prevent Skin Injury  Flowsheets (Taken 12/01/2023 1533)  Body Position:   position changed independently   supine   sitting up in bed  Skin Protection:   adhesive use limited   incontinence pads utilized   tubing/devices free from skin contact  Intervention: Prevent and Manage VTE (Venous Thromboembolism) Risk  Flowsheets (Taken 12/01/2023 1533)  Activity Management:   Up to bedside commode - L3   Rolling - L1  VTE Prevention/Management: bleeding precations maintained  Intervention: Prevent Infection  Flowsheets (Taken 12/01/2023 1533)  Infection Prevention:   cohorting utilized   environmental surveillance performed   equipment surfaces disinfected   hand hygiene promoted   single patient room provided  Goal: Optimal Comfort and Wellbeing  Outcome: Ongoing, Progressing  Intervention: Monitor Pain and Promote Comfort  Flowsheets (Taken 12/01/2023 1533)  Pain Management Interventions: care clustered  Intervention: Provide Person-Centered Care  Flowsheets (Taken 12/01/2023 1533)  Trust Relationship/Rapport:   care explained   questions encouraged   questions answered  Goal: Readiness for  Transition of Care  Outcome: Ongoing, Progressing     Problem: Bariatric Environmental Safety  Goal: Safety Maintained with Care  Outcome: Ongoing, Progressing     Problem: Diabetes Comorbidity  Goal: Blood Glucose Level Within Targeted Range  Outcome: Ongoing, Progressing  Intervention: Monitor and Manage Glycemia  Flowsheets (Taken 12/01/2023 1533)  Glycemic Management: blood glucose monitored     Problem: Infection  Goal: Absence of Infection Signs and Symptoms  Outcome: Ongoing, Progressing  Intervention: Prevent or Manage Infection  Flowsheets (Taken 11/29/2023 0910)  Infection Management: aseptic technique maintained     Problem: Dysrhythmia  Goal: Normalized Cardiac Rhythm  Outcome: Ongoing, Progressing  Intervention: Monitor and Manage Cardiac Rhythm Effect  Flowsheets (Taken 12/01/2023 1533)  VTE Prevention/Management: bleeding precations maintained

## 2023-12-01 NOTE — TELEMEDICINE CONSULT
[facility]    Cardiology  Consult Note    Patient Name: Vesta Lala  MRN: [id number]  Admission Date: 11/25/2023  Hospital Length of Stay: 0 days  Code Status: Full Code   Attending Provider: Lynn Wellington MD   Consulting Provider: Joe Hills MD  Primary Care Physician: Stephanie Sotelo MD  Principal Problem:Acute bronchitis due to respiratory syncytial virus (RSV)    Patient information was obtained from patient and ER records.     Subjective:     Chief Complaint:  Chest pain    HPI:  78-year-old female patient of Dr. Jama got admitted with shortness of breath and was found to have RSV pneumonia.  Patient has a history of atrial fibrillation, hypertension.  She was getting better and was ready to be discharged when she developed chest discomfort.  She reported left-sided heaviness which got worsened with minimal activity such as getting up and going to bathroom and relieved by rest.  No other associated symptoms reported.  She has known history of dizziness and feels dizziness if she gets up quickly from sitting to standing position.  Her shortness of breath is improved and she overall feels fine.  During chest pain an EKG was performed that showed T-wave inversions in the anterolateral leads.    Patient telemetry showed patient continued in atrial fibrillation.  Her rate is controlled.      Patient was seen in hospital by Cardiology and P and was recommended MCOT monitor for atrial fibrillation.    Past Medical History:   Diagnosis Date    High cholesterol     Hypertension     Internal hemorrhoids 07/2023    Paroxysmal atrial fibrillation        Past Surgical History:   Procedure Laterality Date    CARPAL TUNNEL RELEASE      CHOLECYSTECTOMY      GASTRIC BYPASS      hysterectomy  1990    JOINT REPLACEMENT      TONSILLECTOMY         Review of patient's allergies indicates:   Allergen Reactions    Adhesive      Other reaction(s): rash    Adhesive tape-silicones      Other  reaction(s): rash    Dexlansoprazole      HA and did not work    Hydrocodone-acetaminophen      Other reaction(s): anxious, itching    Lovastatin     Meperidine      Other reaction(s): N/v    Simvastatin        No current facility-administered medications on file prior to encounter.     Current Outpatient Medications on File Prior to Encounter   Medication Sig    amLODIPine (NORVASC) 5 MG tablet TAKE 1 TABLET EVERY DAY    docusate sodium (COLACE) 100 MG capsule Take 100 mg by mouth 2 (two) times daily as needed for Constipation.    ergocalciferol (ERGOCALCIFEROL) 50,000 unit Cap Take 50,000 Units by mouth every 7 days.    furosemide (LASIX) 40 MG tablet TAKE 1 TABLET EVERY DAY    gabapentin (NEURONTIN) 100 MG capsule TAKE ONE CAPSULE BY MOUTH EVERY MORNING AND TAKE TWO CAPSULES BY MOUTH EVERY DAY AT BEDTIME    levothyroxine (SYNTHROID) 88 MCG tablet Take 1 tablet (88 mcg total) by mouth once daily.    metoprolol succinate (TOPROL-XL) 50 MG 24 hr tablet TAKE 1 TABLET EVERY DAY    multivitamin with minerals tablet Take 1 tablet by mouth once daily.    rosuvastatin (CRESTOR) 20 MG tablet Take 1 tablet (20 mg total) by mouth once daily.    XARELTO 20 mg Tab Take 20 mg by mouth Daily.    fluticasone propionate (FLONASE) 50 mcg/actuation nasal spray USE 2 SPRAY(S) IN EACH NOSTRIL ONCE DAILY FOR 10 DAYS     Family History    None       Tobacco Use    Smoking status: Never    Smokeless tobacco: Never   Substance and Sexual Activity    Alcohol use: Never    Drug use: Not on file    Sexual activity: Not on file       Review of Systems    Objective:     Vital Signs (Most Recent):  Temp: 97.7 °F (36.5 °C) (12/01/23 1110)  Pulse: 80 (12/01/23 1332)  Resp: 20 (12/01/23 1332)  BP: 101/69 (12/01/23 1110)  SpO2: 99 % (12/01/23 1332) Vital Signs (24h Range):  Temp:  [97.5 °F (36.4 °C)-98.4 °F (36.9 °C)] 97.7 °F (36.5 °C)  Pulse:  [] 80  Resp:  [18-20] 20  SpO2:  [92 %-99 %] 99 %  BP: (101-115)/(66-81) 101/69     Weight: 121.8  kg (268 lb 8.3 oz)  Body mass index is 47.57 kg/m².    SpO2: 99 %         Intake/Output Summary (Last 24 hours) at 12/1/2023 1522  Last data filed at 12/1/2023 1320  Gross per 24 hour   Intake 600 ml   Output --   Net 600 ml       Lines/Drains/Airways       Peripheral Intravenous Line  Duration                  Peripheral IV - Single Lumen 11/27/23 0935 22 G Anterior;Right Forearm 4 days                    Significant Labs:  Recent Results (from the past 72 hour(s))   POCT glucose    Collection Time: 11/29/23  5:13 AM   Result Value Ref Range    POCT Glucose 105 70 - 110 mg/dL   POCT glucose    Collection Time: 11/30/23  5:38 AM   Result Value Ref Range    POCT Glucose 99 70 - 110 mg/dL   POCT glucose    Collection Time: 12/01/23  5:53 AM   Result Value Ref Range    POCT Glucose 103 70 - 110 mg/dL   Troponin I    Collection Time: 12/01/23  8:16 AM   Result Value Ref Range    Troponin-I 0.012 0.000 - 0.045 ng/mL   Comprehensive Metabolic Panel    Collection Time: 12/01/23  1:12 PM   Result Value Ref Range    Sodium Level 145 136 - 145 mmol/L    Potassium Level 3.0 (L) 3.5 - 5.1 mmol/L    Chloride 100 98 - 107 mmol/L    Carbon Dioxide 35 (H) 23 - 31 mmol/L    Glucose Level 146 (H) 82 - 115 mg/dL    Blood Urea Nitrogen 20.8 (H) 9.8 - 20.1 mg/dL    Creatinine 0.94 0.55 - 1.02 mg/dL    Calcium Level Total 8.5 8.4 - 10.2 mg/dL    Protein Total 6.1 5.8 - 7.6 gm/dL    Albumin Level 3.0 (L) 3.4 - 4.8 g/dL    Globulin 3.1 2.4 - 3.5 gm/dL    Albumin/Globulin Ratio 1.0 (L) 1.1 - 2.0 ratio    Bilirubin Total 0.4 <=1.5 mg/dL    Alkaline Phosphatase 105 40 - 150 unit/L    Alanine Aminotransferase 58 (H) 0 - 55 unit/L    Aspartate Aminotransferase 35 (H) 5 - 34 unit/L    eGFR >60 mls/min/1.73/m2   CBC with Differential    Collection Time: 12/01/23  1:12 PM   Result Value Ref Range    WBC 12.39 (H) 4.50 - 11.50 x10(3)/mcL    RBC 4.85 4.20 - 5.40 x10(6)/mcL    Hgb 12.3 12.0 - 16.0 g/dL    Hct 43.6 37.0 - 47.0 %    MCV 89.9 80.0 -  94.0 fL    MCH 25.4 (L) 27.0 - 31.0 pg    MCHC 28.2 (L) 33.0 - 36.0 g/dL    RDW 15.8 11.5 - 17.0 %    Platelet 350 130 - 400 x10(3)/mcL    MPV 10.3 7.4 - 10.4 fL    Neut % 60.2 %    Lymph % 32.0 %    Mono % 5.5 %    Eos % 1.7 %    Basophil % 0.4 %    Lymph # 3.97 0.6 - 4.6 x10(3)/mcL    Neut # 7.46 2.1 - 9.2 x10(3)/mcL    Mono # 0.68 0.1 - 1.3 x10(3)/mcL    Eos # 0.21 0 - 0.9 x10(3)/mcL    Baso # 0.05 <=0.2 x10(3)/mcL    IG# 0.02 0 - 0.04 x10(3)/mcL    IG% 0.2 %       Significant Imaging:  Imaging Results              X-Ray Chest AP Portable (Final result)  Result time 11/26/23 13:14:11      Final result by Columba Benson MD (11/26/23 13:14:11)                   Impression:      No acute abnormality of the chest.      Electronically signed by: Columba Benson  Date:    11/26/2023  Time:    13:14               Narrative:    EXAMINATION:  XR CHEST AP PORTABLE    CLINICAL HISTORY:  Cough;    COMPARISON:  Chest x-ray dated 05/08/2023    FINDINGS:  The heart is stable in size.  There is no focal airspace consolidation.  There is no pleural effusion or visible pneumothorax.                        Wet Read by Cirstian Rush MD (11/25/23 21:25:00, [facility], Emergency Medicine)    No acute cardiopulmonary process                                    EKG:    Results for orders placed or performed during the hospital encounter of 11/25/23   EKG 12-lead    Narrative    Test Reason : I48.91,    Vent. Rate : 069 BPM     Atrial Rate : 069 BPM     P-R Int : 160 ms          QRS Dur : 088 ms      QT Int : 432 ms       P-R-T Axes : 061 010 061 degrees     QTc Int : 462 ms    Normal sinus rhythm  Normal ECG  Confirmed by Nicola Patel MD ([id number]) on 11/28/2023 12:06:17 PM    Referred By: ALEC   SELF           Confirmed By:Nicola Patel MD       Telemetry:  Atrial fibrillation    Physical Exam  Alert and oriented x3  CV: S1-S2, irregularly irregular   lungs:  Bilateral rhonchi.    Home Medications:    No current facility-administered medications on file prior to encounter.     Current Outpatient Medications on File Prior to Encounter   Medication Sig Dispense Refill    amLODIPine (NORVASC) 5 MG tablet TAKE 1 TABLET EVERY DAY 90 tablet 3    docusate sodium (COLACE) 100 MG capsule Take 100 mg by mouth 2 (two) times daily as needed for Constipation.      ergocalciferol (ERGOCALCIFEROL) 50,000 unit Cap Take 50,000 Units by mouth every 7 days.      furosemide (LASIX) 40 MG tablet TAKE 1 TABLET EVERY DAY 90 tablet 3    gabapentin (NEURONTIN) 100 MG capsule TAKE ONE CAPSULE BY MOUTH EVERY MORNING AND TAKE TWO CAPSULES BY MOUTH EVERY DAY AT BEDTIME 270 capsule 3    levothyroxine (SYNTHROID) 88 MCG tablet Take 1 tablet (88 mcg total) by mouth once daily. 90 tablet 1    metoprolol succinate (TOPROL-XL) 50 MG 24 hr tablet TAKE 1 TABLET EVERY DAY 90 tablet 3    multivitamin with minerals tablet Take 1 tablet by mouth once daily.      rosuvastatin (CRESTOR) 20 MG tablet Take 1 tablet (20 mg total) by mouth once daily. 90 tablet 1    XARELTO 20 mg Tab Take 20 mg by mouth Daily.      fluticasone propionate (FLONASE) 50 mcg/actuation nasal spray USE 2 SPRAY(S) IN EACH NOSTRIL ONCE DAILY FOR 10 DAYS         Current Inpatient Medications:    Current Facility-Administered Medications:     acetaminophen tablet 650 mg, 650 mg, Oral, Q4H PRN, Reyes, Thairy G, DO, 650 mg at 11/27/23 2200    aluminum-magnesium hydroxide-simethicone 200-200-20 mg/5 mL suspension 30 mL, 30 mL, Oral, QID PRN, Reyes, Thairy G, DO, 30 mL at 11/29/23 1731    benzonatate capsule 200 mg, 200 mg, Oral, TID, Reyes, Thairy G, DO, 200 mg at 12/01/23 1413    bisacodyL suppository 10 mg, 10 mg, Rectal, Daily PRN, Reyes, Thairy G, DO    cloNIDine tablet 0.1 mg, 0.1 mg, Oral, Q4H PRN, Cristian Rush MD, 0.1 mg at 11/25/23 6654    dextromethorphan-guaiFENesin  mg/5 ml liquid 10 mL, 10 mL, Oral, Q4H PRN, Cristian Rush MD, 10 mL at 11/29/23 2327    dextrose  10% bolus 125 mL 125 mL, 12.5 g, Intravenous, PRN, Cristian Ruhs MD    dextrose 10% bolus 250 mL 250 mL, 25 g, Intravenous, PRN, Cristian Rush MD    diltiaZEM injection 15 mg, 15 mg, Intravenous, Once PRN, Syd Oleary MD    diltiaZEM tablet 30 mg, 30 mg, Oral, Q6H, Syd Oleary MD, 30 mg at 12/01/23 1241    famotidine tablet 20 mg, 20 mg, Oral, BID, Cristian Rush MD, 20 mg at 12/01/23 0922    fluconazole tablet 100 mg, 100 mg, Oral, Daily, Lynn Wellington MD, 100 mg at 12/01/23 0922    furosemide tablet 40 mg, 40 mg, Oral, Daily, Reyes, Thairy G, DO, 40 mg at 12/01/23 0923    gabapentin capsule 100 mg, 100 mg, Oral, Daily, Reyes, Thairy G, DO, 100 mg at 12/01/23 0923    gabapentin capsule 200 mg, 200 mg, Oral, QHS, Reyes, Thairy G, DO, 200 mg at 11/30/23 2126    levalbuterol nebulizer solution 1.25 mg, 1.25 mg, Nebulization, TID, Syd Oleary MD, 1.25 mg at 12/01/23 1332    levothyroxine tablet 88 mcg, 88 mcg, Oral, Daily, Reyes, Thairy G, DO, 88 mcg at 12/01/23 0923    melatonin tablet 9 mg, 9 mg, Oral, Nightly PRN, Reyes, Thairy G, DO, 9 mg at 11/30/23 2127    metoprolol injection 5 mg, 5 mg, Intravenous, Q5 Min PRN, Syd Oleary MD, 5 mg at 11/28/23 2247    metoprolol tartrate (LOPRESSOR) tablet 50 mg, 50 mg, Oral, BID, Laura Wong FNP, 50 mg at 12/01/23 0923    multivitamin tablet, 1 tablet, Oral, Daily, Reyes, Thairy G, DO, 1 tablet at 12/01/23 0922    naloxone 0.4 mg/mL injection 0.02 mg, 0.02 mg, Intravenous, PRN, Reyes, Thairy G, DO    nystatin 100,000 unit/mL suspension 500,000 Units, 500,000 Units, Oral, QID, Lynn Wellington MD, 500,000 Units at 12/01/23 1241    ondansetron disintegrating tablet 8 mg, 8 mg, Oral, Q8H PRN, Cristian Rush MD, 8 mg at 11/29/23 1859    ondansetron injection 4 mg, 4 mg, Intravenous, Q8H PRN, Reyes, Thairy G, DO    polyethylene glycol packet 17 g, 17 g, Oral, TID PRN, Reyes, Thairy G, DO, 17 g at 12/01/23 0559    rivaroxaban tablet 20 mg, 20 mg,  Oral, Daily, Reyes, Thairy G, DO, 20 mg at 11/30/23 1715    simethicone chewable tablet 80 mg, 1 tablet, Oral, QID PRN, Reyes, Thairy G, DO    sodium chloride 0.9% flush 10 mL, 10 mL, Intravenous, PRN, Reyes, Thairy G, DO    traMADoL tablet 50 mg, 50 mg, Oral, Q6H PRN, Cristian Rush MD, 50 mg at 11/29/23 1144         VTE Risk Mitigation (From admission, onward)           Ordered     rivaroxaban tablet 20 mg  Daily         11/26/23 0659     IP VTE HIGH RISK PATIENT  Once         11/26/23 0659     Reason for No Pharmacological VTE Prophylaxis  Once        Question:  Reasons:  Answer:  Already adequately anticoagulated on oral Anticoagulants    11/26/23 0659     Place sequential compression device  Until discontinued         11/25/23 2302                    Assessment:   Chest pain, atypical  Paroxysmal atrial fibrillation  Hypertension  RSV pneumonia      Plan:   Patient's chest pain appears stable angina.  EKG showed T-wave inversions in anterolateral leads.  Since patient continued to have pain with the exertion recommend to give her nitroglycerin p.r.n. further chest pain.  I will also start her on long-acting nitro isosorbide mononitrate 30 mg once a day.  Her blood pressure has been stable.  Patient has paroxysmal atrial fibrillation.  Atrial fibrillation mainly due to her lung inflammation.  Her rate is well controlled.  She we will continue on Xarelto.  Continue other rate-controlling medications.  Patient's blood pressure is better.  Continue home medications.  Patient has some bronchitis on examination.  Recommend to continue bronchodilator treatment.      Thank you for your consult.     Joe Hills MD  Cardiology  [facility]  12/01/2023 3:22 PM

## 2023-12-01 NOTE — PROGRESS NOTES
"Inpatient Nutrition Evaluation    Admit Date: 11/25/2023   Total duration of encounter: 6 days    Nutrition Recommendation/Prescription     Continue heart healthy diet. 2. Add boost plus BID per patient request     Nutrition Assessment     Chart Review    Reason Seen: continuous nutrition monitoring, length of stay, and follow-up    Malnutrition Screening Tool Results   Have you recently lost weight without trying?: No  Have you been eating poorly because of a decreased appetite?: Yes   MST Score: 1     Diagnosis:    Acute bronchitis due to respiratory syncytial virus (RSV) [J20.5]   Atrial Fibrillation with RVR, CLAY, chest tightness  Relevant Medical History: HTN    Nutrition-Related Medications: fluconazole, furosemide, levothyroxine, multivitamin,      Nutrition-Related Labs:      Diet Order: Diet heart healthy  Oral Supplement Order: none  Appetite/Oral Intake: fair/50-75% of meals  Factors Affecting Nutritional Intake: decreased appetite and thursh in mouth  Food/Jewish/Cultural Preferences:  boost   Food Allergies: none reported       Wound(s):       Comments    Pt intake 50% of breakfast, EMR. Pt in droplet precautions. Provide phone call. Pt stated thrush is doing better. Pt wanted to try gumbo.     Anthropometrics    Height: 5' 3" (160 cm) Height Method: Stated  Last Weight: 121.8 kg (268 lb 8.3 oz) (11/27/23 0500) Weight Method: Bed Scale  BMI (Calculated): 47.6  BMI Classification: obese grade III (BMI >/=40)     Ideal Body Weight (IBW), Female: 115 lb     % Ideal Body Weight, Female (lb): 233.5 %                             Usual Weight Provided By: unable to obtain usual weight    Wt Readings from Last 5 Encounters:   11/27/23 121.8 kg (268 lb 8.3 oz)   09/28/23 119.4 kg (263 lb 3.2 oz)   07/27/23 119.7 kg (264 lb)   05/08/23 113.4 kg (250 lb)   03/27/23 117.3 kg (258 lb 8 oz)     Weight Change(s) Since Admission:  Admit Weight: 113.4 kg (250 lb) (11/25/23 2057)  No new wt from 11/27/23    Patient " Education    Not applicable.    Monitoring & Evaluation     Dietitian will monitor food and beverage intake, weight, weight change, electrolyte/renal panel, glucose/endocrine profile, and gastrointestinal profile.  Nutrition Risk/Follow-Up: low (follow-up in 5-7 days)  Patients assigned 'low nutrition risk' status do not qualify for a full nutritional assessment but will be monitored and re-evaluated in a 5-7 day time period. Please consult if re-evaluation needed sooner.

## 2023-12-01 NOTE — PLAN OF CARE
Problem: Adult Inpatient Plan of Care  Goal: Plan of Care Review  Outcome: Ongoing, Progressing  Flowsheets (Taken 12/1/2023 0126)  Plan of Care Reviewed With:   patient   daughter  Goal: Patient-Specific Goal (Individualized)  Outcome: Ongoing, Progressing  Flowsheets (Taken 12/1/2023 0126)  Anxieties, Fears or Concerns: none at this time  Individualized Care Needs: Monitor pt HR via telemetry, Maintain Droplet isolation, Monitor safety, Monitor blood glucose  Goal: Absence of Hospital-Acquired Illness or Injury  Outcome: Ongoing, Progressing  Intervention: Prevent and Manage VTE (Venous Thromboembolism) Risk  Flowsheets (Taken 11/30/2023 2000)  VTE Prevention/Management:   ambulation promoted   bleeding precations maintained   bleeding risk assessed   dorsiflexion/plantar flexion performed   ROM (active) performed  Goal: Optimal Comfort and Wellbeing  Outcome: Ongoing, Progressing  Intervention: Monitor Pain and Promote Comfort  Flowsheets (Taken 11/30/2023 2000)  Pain Management Interventions:   care clustered   diversional activity provided   pain management plan reviewed with patient/caregiver   pillow support provided   position adjusted   quiet environment facilitated   relaxation techniques promoted     Problem: Infection  Goal: Absence of Infection Signs and Symptoms  Outcome: Ongoing, Progressing

## 2023-12-01 NOTE — PROGRESS NOTES
[facility]  \[facility]\"" MEDICINE ~ PROGRESS NOTE        CHIEF COMPLAINT   Hospital follow up    HOSPITAL COURSE     79 yo female with PMH of HTN, spinal stenosis, HLD, CLAY (has not had a functioning CPAP for 6 months), AF and gastric sleeve who presents with complaint of wheezing and cough that started 6 days ago. She was prescribed a Z-johnson and an inhaler and completed the course. Symptoms did not improve so she followed up at a walk in clinic on Friday where she tested + for RSV. She was given a steroid shot but symptoms continued to worsen and she presented to our ED. At baseline patient lives alone and ambulates with a walker.    During the hospital stay she had developed atrial fibrillation with RVR, she has a known history of atrial fibrillation, Cardiology was consulted.     Today  She has some chest pain today. Troponin wnl but it was exertional. Described as heavy. EKG shows A fib. St and T wave abnomrlaity, consider anterolateral ischemia. Will consult [facility] after discussing with family. Cannot dc home at this time.           OBJECTIVE/PHYSICAL EXAM     VITAL SIGNS (MOST RECENT):  Temp: 98.4 °F (36.9 °C) (12/01/23 0300)  Pulse: 99 (12/01/23 0902)  Resp: 18 (12/01/23 0902)  BP: 107/72 (12/01/23 0923)  SpO2: (!) 93 % (12/01/23 0902) VITAL SIGNS (24 HOUR RANGE):  Temp:  [97.5 °F (36.4 °C)-98.4 °F (36.9 °C)] 98.4 °F (36.9 °C)  Pulse:  [] 99  Resp:  [18-20] 18  SpO2:  [90 %-98 %] 93 %  BP: (104-116)/(72-81) 107/72   GENERAL: In no acute distress, afebrile  HEENT:  CHEST: wheezing in all lung fields.   HEART: S1, S2, no appreciable murmur  ABDOMEN: Soft, nontender, BS +  MSK: Warm, no lower extremity edema, no clubbing or cyanosis  NEUROLOGIC: Alert and oriented x4, moving all extremities with good strength   INTEGUMENTARY:  PSYCHIATRY:        ASSESSMENT/PLAN     Active Hospital Problems    Diagnosis  POA    *Acute bronchitis due to respiratory syncytial virus (RSV) [J20.5]  Yes  "     Resolved Hospital Problems   No resolved problems to display.    RSV bronchiolitis   Atrial fibrillation with RVR  Obstructive sleep apnea   Chest tightness 12/1     History of: HTN, atrial fibrillation, Bariatric surgery        Xopenex 3 times daily.  Prednisone 20 daily.    Metoprolol 50 b.i.d., Xarelto 20.  Metoprolol IV push PRN.  Start oral Cardizem 30 mg every 6 hours and transitioned to extended release tomorrow if heart rate controlled    Consult [facility]-EKG andTroponins done 12/1     DVT prophylaxis:  Xarelto 20      Anticipated discharge and disposition: Tomorrow  __________________________________________________________________________    LABS/MICRO/MEDS/DIAGNOSTICS       LABS  No results for input(s): "NA", "K", "CHLORIDE", "CO2", "BUN", "CREATININE", "GLUCOSE", "CALCIUM", "ALKPHOS", "AST", "ALT", "ALBUMIN" in the last 72 hours.  No results for input(s): "WBC", "RBC", "HCT", "MCV", "PLT" in the last 72 hours.    Invalid input(s): "HG"    MICROBIOLOGY  Microbiology Results (last 7 days)       ** No results found for the last 168 hours. **               MEDICATIONS   benzonatate  200 mg Oral TID    diltiaZEM  30 mg Oral Q6H    famotidine  20 mg Oral BID    fluconazole  100 mg Oral Daily    furosemide  40 mg Oral Daily    gabapentin  100 mg Oral Daily    gabapentin  200 mg Oral QHS    levalbuterol  1.25 mg Nebulization TID    levothyroxine  88 mcg Oral Daily    metoprolol tartrate  50 mg Oral BID    multivitamin  1 tablet Oral Daily    nystatin  500,000 Units Oral QID    rivaroxaban  20 mg Oral Daily         INFUSIONS         DIAGNOSTIC TESTS  X-Ray Chest AP Portable   ED Interpretation   No acute cardiopulmonary process      Final Result      No acute abnormality of the chest.         Electronically signed by: Columba Benson   Date:    11/26/2023   Time:    13:14           EF   Date Value Ref Range Status   11/27/2023 65 % Final            Case related differential diagnoses have been reviewed; " assessment and plan has been documented. I have personally reviewed the labs and test results that are currently available; I have reviewed the patients medication list. I have reviewed the consulting providers recommendations. I have reviewed or attempted to review medical records based upon their availability.  All of the patient's and/or family's questions have been addressed and answered to the best of my ability.  I will continue to monitor closely and make adjustments to medical management as needed.  This document was created using M*Modal Fluency Direct.  Transcription errors may have been made.  Please contact me if any questions may rise regarding documentation to clarify transcription.        Lynn Wellington MD   Internal Medicine  Department of Hospital Medicine [facility]

## 2023-12-02 PROCEDURE — 25000003 PHARM REV CODE 250: Performed by: INTERNAL MEDICINE

## 2023-12-02 PROCEDURE — 94760 N-INVAS EAR/PLS OXIMETRY 1: CPT

## 2023-12-02 PROCEDURE — G0378 HOSPITAL OBSERVATION PER HR: HCPCS

## 2023-12-02 PROCEDURE — 25000003 PHARM REV CODE 250: Performed by: SPECIALIST

## 2023-12-02 PROCEDURE — 94640 AIRWAY INHALATION TREATMENT: CPT | Mod: XB

## 2023-12-02 PROCEDURE — 63700000 PHARM REV CODE 250 ALT 637 W/O HCPCS: Performed by: INTERNAL MEDICINE

## 2023-12-02 PROCEDURE — 25000242 PHARM REV CODE 250 ALT 637 W/ HCPCS: Performed by: INTERNAL MEDICINE

## 2023-12-02 PROCEDURE — 94660 CPAP INITIATION&MGMT: CPT

## 2023-12-02 PROCEDURE — 27000221 HC OXYGEN, UP TO 24 HOURS

## 2023-12-02 PROCEDURE — 63600175 PHARM REV CODE 636 W HCPCS: Performed by: INTERNAL MEDICINE

## 2023-12-02 PROCEDURE — 25000003 PHARM REV CODE 250: Performed by: NURSE PRACTITIONER

## 2023-12-02 PROCEDURE — 99900035 HC TECH TIME PER 15 MIN (STAT)

## 2023-12-02 PROCEDURE — 25000003 PHARM REV CODE 250: Performed by: STUDENT IN AN ORGANIZED HEALTH CARE EDUCATION/TRAINING PROGRAM

## 2023-12-02 RX ORDER — NITROGLYCERIN 0.4 MG/1
0.4 TABLET SUBLINGUAL EVERY 5 MIN PRN
Status: DISCONTINUED | OUTPATIENT
Start: 2023-12-02 | End: 2023-12-03 | Stop reason: HOSPADM

## 2023-12-02 RX ORDER — ISOSORBIDE MONONITRATE 30 MG/1
30 TABLET, EXTENDED RELEASE ORAL DAILY
Status: DISCONTINUED | OUTPATIENT
Start: 2023-12-02 | End: 2023-12-03 | Stop reason: HOSPADM

## 2023-12-02 RX ADMIN — DILTIAZEM HYDROCHLORIDE 30 MG: 30 TABLET, FILM COATED ORAL at 05:12

## 2023-12-02 RX ADMIN — NYSTATIN 500000 UNITS: 100000 SUSPENSION ORAL at 12:12

## 2023-12-02 RX ADMIN — FAMOTIDINE 20 MG: 20 TABLET ORAL at 08:12

## 2023-12-02 RX ADMIN — FLUCONAZOLE 100 MG: 100 TABLET ORAL at 09:12

## 2023-12-02 RX ADMIN — NYSTATIN 500000 UNITS: 100000 SUSPENSION ORAL at 05:12

## 2023-12-02 RX ADMIN — BENZONATATE 200 MG: 100 CAPSULE ORAL at 02:12

## 2023-12-02 RX ADMIN — NYSTATIN 500000 UNITS: 100000 SUSPENSION ORAL at 08:12

## 2023-12-02 RX ADMIN — POLYETHYLENE GLYCOL 3350 17 G: 17 POWDER, FOR SOLUTION ORAL at 08:12

## 2023-12-02 RX ADMIN — ISOSORBIDE MONONITRATE 30 MG: 30 TABLET, EXTENDED RELEASE ORAL at 09:12

## 2023-12-02 RX ADMIN — LEVOTHYROXINE SODIUM 88 MCG: 88 TABLET ORAL at 09:12

## 2023-12-02 RX ADMIN — RIVAROXABAN 20 MG: 20 TABLET, FILM COATED ORAL at 05:12

## 2023-12-02 RX ADMIN — DILTIAZEM HYDROCHLORIDE 30 MG: 30 TABLET, FILM COATED ORAL at 12:12

## 2023-12-02 RX ADMIN — MULTIPLE VITAMINS W/ MINERALS TAB 1 TABLET: TAB at 09:12

## 2023-12-02 RX ADMIN — BENZONATATE 200 MG: 100 CAPSULE ORAL at 09:12

## 2023-12-02 RX ADMIN — METOPROLOL TARTRATE 50 MG: 50 TABLET, FILM COATED ORAL at 09:12

## 2023-12-02 RX ADMIN — LEVALBUTEROL HYDROCHLORIDE 1.25 MG: 1.25 SOLUTION RESPIRATORY (INHALATION) at 07:12

## 2023-12-02 RX ADMIN — GABAPENTIN 200 MG: 100 CAPSULE ORAL at 08:12

## 2023-12-02 RX ADMIN — LEVALBUTEROL HYDROCHLORIDE 1.25 MG: 1.25 SOLUTION RESPIRATORY (INHALATION) at 03:12

## 2023-12-02 RX ADMIN — NYSTATIN 500000 UNITS: 100000 SUSPENSION ORAL at 09:12

## 2023-12-02 RX ADMIN — POTASSIUM CHLORIDE 20 MEQ: 1500 TABLET, EXTENDED RELEASE ORAL at 09:12

## 2023-12-02 RX ADMIN — METOPROLOL TARTRATE 50 MG: 50 TABLET, FILM COATED ORAL at 08:12

## 2023-12-02 RX ADMIN — DILTIAZEM HYDROCHLORIDE 30 MG: 30 TABLET, FILM COATED ORAL at 07:12

## 2023-12-02 RX ADMIN — LEVALBUTEROL HYDROCHLORIDE 1.25 MG: 1.25 SOLUTION RESPIRATORY (INHALATION) at 08:12

## 2023-12-02 RX ADMIN — MELATONIN TAB 3 MG 9 MG: 3 TAB at 08:12

## 2023-12-02 RX ADMIN — GABAPENTIN 100 MG: 100 CAPSULE ORAL at 09:12

## 2023-12-02 RX ADMIN — PREDNISONE 30 MG: 20 TABLET ORAL at 09:12

## 2023-12-02 RX ADMIN — BENZONATATE 200 MG: 100 CAPSULE ORAL at 08:12

## 2023-12-02 RX ADMIN — FAMOTIDINE 20 MG: 20 TABLET ORAL at 09:12

## 2023-12-02 NOTE — PLAN OF CARE
Problem: Adult Inpatient Plan of Care  Goal: Plan of Care Review  Outcome: Ongoing, Progressing  Flowsheets (Taken 12/02/2023 1043)  Plan of Care Reviewed With:   patient   daughter  Goal: Patient-Specific Goal (Individualized)  Outcome: Ongoing, Progressing  Flowsheets (Taken 12/02/2023 1043)  Anxieties, Fears or Concerns: none  Individualized Care Needs: Tele monitoring, control of heart rate, maintain safety  Goal: Absence of Hospital-Acquired Illness or Injury  Outcome: Ongoing, Progressing  Intervention: Identify and Manage Fall Risk  Flowsheets (Taken 12/02/2023 1043)  Safety Promotion/Fall Prevention:   assistive device/personal item within reach   bed alarm set   lighting adjusted   medications reviewed   nonskid shoes/socks when out of bed   side rails raised x 3   instructed to call staff for mobility  Intervention: Prevent Skin Injury  Flowsheets (Taken 12/02/2023 1043)  Body Position:   position changed independently   supine   sitting up in bed  Skin Protection:   adhesive use limited   incontinence pads utilized   tubing/devices free from skin contact  Intervention: Prevent and Manage VTE (Venous Thromboembolism) Risk  Flowsheets (Taken 12/02/2023 1043)  Activity Management:   Up to bedside commode - L3   Rolling - L1  VTE Prevention/Management: bleeding precations maintained  Intervention: Prevent Infection  Flowsheets (Taken 12/02/2023 1043)  Infection Prevention:   cohorting utilized   environmental surveillance performed   equipment surfaces disinfected   hand hygiene promoted   single patient room provided  Goal: Optimal Comfort and Wellbeing  Outcome: Ongoing, Progressing  Intervention: Monitor Pain and Promote Comfort  Flowsheets (Taken 12/02/2023 1043)  Pain Management Interventions: care clustered  Intervention: Provide Person-Centered Care  Flowsheets (Taken 12/02/2023 1043)  Trust Relationship/Rapport:   care explained   questions encouraged   questions answered  Goal: Readiness for  Transition of Care  Outcome: Ongoing, Progressing     Problem: Bariatric Environmental Safety  Goal: Safety Maintained with Care  Outcome: Ongoing, Progressing     Problem: Diabetes Comorbidity  Goal: Blood Glucose Level Within Targeted Range  Outcome: Ongoing, Progressing  Intervention: Monitor and Manage Glycemia  Flowsheets (Taken 12/02/2023 1043)  Glycemic Management: blood glucose monitored     Problem: Infection  Goal: Absence of Infection Signs and Symptoms  Outcome: Ongoing, Progressing  Intervention: Prevent or Manage Infection  Flowsheets (Taken 12/02/2023 1043)  Infection Management: aseptic technique maintained     Problem: Dysrhythmia  Goal: Normalized Cardiac Rhythm  Outcome: Ongoing, Progressing  Intervention: Monitor and Manage Cardiac Rhythm Effect  Flowsheets (Taken 12/02/2023 1043)  VTE Prevention/Management: bleeding precations maintained

## 2023-12-02 NOTE — PROGRESS NOTES
[facility]  hospitals MEDICINE ~ PROGRESS NOTE        CHIEF COMPLAINT   Hospital follow up    HOSPITAL COURSE     79 yo female with PMH of HTN, spinal stenosis, HLD, CLAY (has not had a functioning CPAP for 6 months), AF and gastric sleeve who presents with complaint of wheezing and cough that started 6 days ago. She was prescribed a Z-johnosn and an inhaler and completed the course. Symptoms did not improve so she followed up at a walk in clinic on Friday where she tested + for RSV. She was given a steroid shot but symptoms continued to worsen and she presented to our ED. At baseline patient lives alone and ambulates with a walker.    During the hospital stay she had developed atrial fibrillation with RVR, she has a known history of atrial fibrillation, Cardiology was consulted.     Today  [facility] recommended Imdur daily, NTG prn,   I will start Prednisone 30 mg po daily today          OBJECTIVE/PHYSICAL EXAM     VITAL SIGNS (MOST RECENT):  Temp: 98.3 °F (36.8 °C) (12/02/23 0900)  Pulse: 97 (12/02/23 0900)  Resp: 18 (12/02/23 0740)  BP: 115/78 (12/02/23 0900)  SpO2: 96 % (12/02/23 0900) VITAL SIGNS (24 HOUR RANGE):  Temp:  [97.6 °F (36.4 °C)-99.1 °F (37.3 °C)] 98.3 °F (36.8 °C)  Pulse:  [] 97  Resp:  [18-20] 18  SpO2:  [92 %-99 %] 96 %  BP: (101-117)/(69-84) 115/78   GENERAL: In no acute distress, afebrile  HEENT:  CHEST: wheezing in all lung fields.   HEART: S1, S2, no appreciable murmur  ABDOMEN: Soft, nontender, BS +  MSK: Warm, no lower extremity edema, no clubbing or cyanosis  NEUROLOGIC: Alert and oriented x4, moving all extremities with good strength   INTEGUMENTARY:  PSYCHIATRY:        ASSESSMENT/PLAN     Active Hospital Problems    Diagnosis  POA    *Acute bronchitis due to respiratory syncytial virus (RSV) [J20.5]  Yes      Resolved Hospital Problems   No resolved problems to display.    RSV bronchiolitis   Atrial fibrillation with RVR  Obstructive sleep apnea   Chest tightness  12/1  Chronic Stable Angina    History of: HTN, atrial fibrillation, Bariatric surgery        Xopenex 3 times daily.    Metoprolol 50 b.i.d., Xarelto 20.  Metoprolol IV push PRN.  Start prednisone 30 mg po daily  Start Imdur  Lasix on hold, resume TBD  Labs in am    Consult [facility]-EKG andTroponins done 12/1     DVT prophylaxis:  Xarelto 20      Anticipated discharge and disposition: TBD  __________________________________________________________________________    LABS/MICRO/MEDS/DIAGNOSTICS       LABS  Recent Labs     12/01/23  1312      K 3.0*   CHLORIDE 100   CO2 35*   BUN 20.8*   CREATININE 0.94   GLUCOSE 146*   CALCIUM 8.5   ALKPHOS 105   AST 35*   ALT 58*   ALBUMIN 3.0*     Recent Labs     12/01/23  1312   WBC 12.39*   RBC 4.85   HCT 43.6   MCV 89.9          MICROBIOLOGY  Microbiology Results (last 7 days)       ** No results found for the last 168 hours. **               MEDICATIONS   benzonatate  200 mg Oral TID    diltiaZEM  30 mg Oral Q6H    famotidine  20 mg Oral BID    fluconazole  100 mg Oral Daily    furosemide  40 mg Oral Daily    gabapentin  100 mg Oral Daily    gabapentin  200 mg Oral QHS    isosorbide mononitrate  30 mg Oral Daily    levalbuterol  1.25 mg Nebulization TID    levothyroxine  88 mcg Oral Daily    metoprolol tartrate  50 mg Oral BID    multivitamin  1 tablet Oral Daily    nystatin  500,000 Units Oral QID    potassium chloride  20 mEq Oral Daily    predniSONE  30 mg Oral Daily    rivaroxaban  20 mg Oral Daily         INFUSIONS         DIAGNOSTIC TESTS  X-Ray Chest AP Portable   ED Interpretation   No acute cardiopulmonary process      Final Result      No acute abnormality of the chest.         Electronically signed by: Columba Benson   Date:    11/26/2023   Time:    13:14           EF   Date Value Ref Range Status   11/27/2023 65 % Final            Case related differential diagnoses have been reviewed; assessment and plan has been documented. I have personally reviewed the  labs and test results that are currently available; I have reviewed the patients medication list. I have reviewed the consulting providers recommendations. I have reviewed or attempted to review medical records based upon their availability.  All of the patient's and/or family's questions have been addressed and answered to the best of my ability.  I will continue to monitor closely and make adjustments to medical management as needed.  This document was created using M*Modal Fluency Direct.  Transcription errors may have been made.  Please contact me if any questions may rise regarding documentation to clarify transcription.        Lynn Wellington MD   Internal Medicine  Department of Hospital Medicine [facility]

## 2023-12-02 NOTE — PLAN OF CARE
Problem: Diabetes Comorbidity  Goal: Blood Glucose Level Within Targeted Range  Outcome: Ongoing, Progressing  Intervention: Monitor and Manage Glycemia  Flowsheets (Taken 12/1/2023 2250)  Glycemic Management: blood glucose monitored     Problem: Infection  Goal: Absence of Infection Signs and Symptoms  Outcome: Ongoing, Progressing  Intervention: Prevent or Manage Infection  Flowsheets (Taken 12/1/2023 2250)  Fever Reduction/Comfort Measures:   lightweight clothing   lightweight bedding  Isolation Precautions:   precautions maintained   droplet

## 2023-12-02 NOTE — PLAN OF CARE
[facility]  Discharge Reassessment    Primary Care Provider: Stephanie Sotelo MD    Expected Discharge Date:     Reassessment (most recent)       Discharge Reassessment - 12/02/23 1634          Discharge Reassessment    Assessment Type Discharge Planning Reassessment     Did the patient's condition or plan change since previous assessment? No     Discharge Plan discussed with: Adult children     Communicated STONE with patient/caregiver Date not available/Unable to determine     Discharge Plan A Home with family;Home Health     DME Needed Upon Discharge  none     Transition of Care Barriers None     Why the patient remains in the hospital Requires continued medical care        Post-Acute Status    Post-Acute Authorization Home Health     Home Health Status Pending medical clearance/testing     Hospital Resources/Appts/Education Provided Provided patient/caregiver with written discharge plan information     Discharge Delays None known at this time

## 2023-12-03 VITALS
DIASTOLIC BLOOD PRESSURE: 77 MMHG | HEART RATE: 100 BPM | RESPIRATION RATE: 18 BRPM | WEIGHT: 268.5 LBS | HEIGHT: 63 IN | SYSTOLIC BLOOD PRESSURE: 116 MMHG | TEMPERATURE: 98 F | OXYGEN SATURATION: 91 % | BODY MASS INDEX: 47.57 KG/M2

## 2023-12-03 LAB
ALBUMIN SERPL-MCNC: 2.9 G/DL (ref 3.4–4.8)
ALBUMIN/GLOB SERPL: 1.3 RATIO (ref 1.1–2)
ALP SERPL-CCNC: 96 UNIT/L (ref 40–150)
ALT SERPL-CCNC: 46 UNIT/L (ref 0–55)
AST SERPL-CCNC: 22 UNIT/L (ref 5–34)
BASOPHILS # BLD AUTO: 0.03 X10(3)/MCL
BASOPHILS NFR BLD AUTO: 0.3 %
BILIRUB SERPL-MCNC: 0.5 MG/DL
BUN SERPL-MCNC: 16.6 MG/DL (ref 9.8–20.1)
CALCIUM SERPL-MCNC: 8.5 MG/DL (ref 8.4–10.2)
CHLORIDE SERPL-SCNC: 102 MMOL/L (ref 98–107)
CO2 SERPL-SCNC: 34 MMOL/L (ref 23–31)
CREAT SERPL-MCNC: 0.63 MG/DL (ref 0.55–1.02)
EOSINOPHIL # BLD AUTO: 0.08 X10(3)/MCL (ref 0–0.9)
EOSINOPHIL NFR BLD AUTO: 0.7 %
ERYTHROCYTE [DISTWIDTH] IN BLOOD BY AUTOMATED COUNT: 15.8 % (ref 11.5–17)
GFR SERPLBLD CREATININE-BSD FMLA CKD-EPI: >60 MLS/MIN/1.73/M2
GLOBULIN SER-MCNC: 2.3 GM/DL (ref 2.4–3.5)
GLUCOSE SERPL-MCNC: 123 MG/DL (ref 82–115)
HCT VFR BLD AUTO: 39 % (ref 37–47)
HGB BLD-MCNC: 11.2 G/DL (ref 12–16)
IMM GRANULOCYTES # BLD AUTO: 0.03 X10(3)/MCL (ref 0–0.04)
IMM GRANULOCYTES NFR BLD AUTO: 0.3 %
LYMPHOCYTES # BLD AUTO: 2.97 X10(3)/MCL (ref 0.6–4.6)
LYMPHOCYTES NFR BLD AUTO: 24.9 %
MAGNESIUM SERPL-MCNC: 2.2 MG/DL (ref 1.6–2.6)
MCH RBC QN AUTO: 25.6 PG (ref 27–31)
MCHC RBC AUTO-ENTMCNC: 28.7 G/DL (ref 33–36)
MCV RBC AUTO: 89.2 FL (ref 80–94)
MONOCYTES # BLD AUTO: 0.67 X10(3)/MCL (ref 0.1–1.3)
MONOCYTES NFR BLD AUTO: 5.6 %
NEUTROPHILS # BLD AUTO: 8.15 X10(3)/MCL (ref 2.1–9.2)
NEUTROPHILS NFR BLD AUTO: 68.2 %
PHOSPHATE SERPL-MCNC: 3.1 MG/DL (ref 2.3–4.7)
PLATELET # BLD AUTO: 313 X10(3)/MCL (ref 130–400)
PMV BLD AUTO: 10.9 FL (ref 7.4–10.4)
POTASSIUM SERPL-SCNC: 4 MMOL/L (ref 3.5–5.1)
PROT SERPL-MCNC: 5.2 GM/DL (ref 5.8–7.6)
RBC # BLD AUTO: 4.37 X10(6)/MCL (ref 4.2–5.4)
SODIUM SERPL-SCNC: 144 MMOL/L (ref 136–145)
WBC # SPEC AUTO: 11.93 X10(3)/MCL (ref 4.5–11.5)

## 2023-12-03 PROCEDURE — 25000242 PHARM REV CODE 250 ALT 637 W/ HCPCS: Performed by: INTERNAL MEDICINE

## 2023-12-03 PROCEDURE — 63600175 PHARM REV CODE 636 W HCPCS: Performed by: INTERNAL MEDICINE

## 2023-12-03 PROCEDURE — 80053 COMPREHEN METABOLIC PANEL: CPT | Performed by: INTERNAL MEDICINE

## 2023-12-03 PROCEDURE — 25000003 PHARM REV CODE 250: Performed by: INTERNAL MEDICINE

## 2023-12-03 PROCEDURE — 25000003 PHARM REV CODE 250: Performed by: SPECIALIST

## 2023-12-03 PROCEDURE — 84100 ASSAY OF PHOSPHORUS: CPT | Performed by: INTERNAL MEDICINE

## 2023-12-03 PROCEDURE — 63700000 PHARM REV CODE 250 ALT 637 W/O HCPCS: Performed by: INTERNAL MEDICINE

## 2023-12-03 PROCEDURE — 83735 ASSAY OF MAGNESIUM: CPT | Performed by: INTERNAL MEDICINE

## 2023-12-03 PROCEDURE — G0378 HOSPITAL OBSERVATION PER HR: HCPCS

## 2023-12-03 PROCEDURE — 94640 AIRWAY INHALATION TREATMENT: CPT

## 2023-12-03 PROCEDURE — 85025 COMPLETE CBC W/AUTO DIFF WBC: CPT | Performed by: INTERNAL MEDICINE

## 2023-12-03 PROCEDURE — 25000003 PHARM REV CODE 250: Performed by: STUDENT IN AN ORGANIZED HEALTH CARE EDUCATION/TRAINING PROGRAM

## 2023-12-03 PROCEDURE — 25000003 PHARM REV CODE 250: Performed by: NURSE PRACTITIONER

## 2023-12-03 RX ORDER — POTASSIUM CHLORIDE 20 MEQ/1
20 TABLET, EXTENDED RELEASE ORAL DAILY
Qty: 30 TABLET | Refills: 0 | Status: SHIPPED | OUTPATIENT
Start: 2023-12-03 | End: 2024-01-02

## 2023-12-03 RX ORDER — TRAMADOL HYDROCHLORIDE 50 MG/1
50 TABLET ORAL EVERY 8 HOURS PRN
Qty: 21 TABLET | Refills: 0 | Status: SHIPPED | OUTPATIENT
Start: 2023-12-03 | End: 2023-12-12

## 2023-12-03 RX ORDER — FLUCONAZOLE 100 MG/1
100 TABLET ORAL DAILY
Qty: 14 TABLET | Refills: 0 | Status: SHIPPED | OUTPATIENT
Start: 2023-12-03 | End: 2023-12-12

## 2023-12-03 RX ORDER — DILTIAZEM HYDROCHLORIDE 30 MG/1
30 TABLET, FILM COATED ORAL EVERY 6 HOURS
Qty: 120 TABLET | Refills: 0 | Status: SHIPPED | OUTPATIENT
Start: 2023-12-03 | End: 2023-12-12

## 2023-12-03 RX ORDER — BENZONATATE 200 MG/1
200 CAPSULE ORAL 3 TIMES DAILY
Qty: 30 CAPSULE | Refills: 0 | Status: SHIPPED | OUTPATIENT
Start: 2023-12-03 | End: 2023-12-03 | Stop reason: SDUPTHER

## 2023-12-03 RX ORDER — GUAIFENESIN/DEXTROMETHORPHAN 100-10MG/5
10 SYRUP ORAL EVERY 4 HOURS PRN
Qty: 100 ML | Refills: 0 | Status: SHIPPED | OUTPATIENT
Start: 2023-12-03 | End: 2023-12-12 | Stop reason: ALTCHOICE

## 2023-12-03 RX ORDER — LEVALBUTEROL INHALATION SOLUTION 1.25 MG/3ML
1 SOLUTION RESPIRATORY (INHALATION) EVERY 8 HOURS PRN
Qty: 90 EACH | Refills: 0 | Status: SHIPPED | OUTPATIENT
Start: 2023-12-03 | End: 2023-12-12 | Stop reason: ALTCHOICE

## 2023-12-03 RX ORDER — NYSTATIN 100000 [USP'U]/ML
500000 SUSPENSION ORAL 4 TIMES DAILY
Qty: 200 ML | Refills: 0 | Status: SHIPPED | OUTPATIENT
Start: 2023-12-03 | End: 2023-12-03 | Stop reason: SDUPTHER

## 2023-12-03 RX ORDER — FLUCONAZOLE 100 MG/1
100 TABLET ORAL DAILY
Qty: 14 TABLET | Refills: 0 | Status: SHIPPED | OUTPATIENT
Start: 2023-12-03 | End: 2023-12-03 | Stop reason: SDUPTHER

## 2023-12-03 RX ORDER — POTASSIUM CHLORIDE 20 MEQ/1
20 TABLET, EXTENDED RELEASE ORAL DAILY
Qty: 30 TABLET | Refills: 0 | Status: SHIPPED | OUTPATIENT
Start: 2023-12-03 | End: 2023-12-03 | Stop reason: SDUPTHER

## 2023-12-03 RX ORDER — FAMOTIDINE 20 MG/1
20 TABLET, FILM COATED ORAL 2 TIMES DAILY
Qty: 60 TABLET | Refills: 0 | Status: SHIPPED | OUTPATIENT
Start: 2023-12-03 | End: 2023-12-03 | Stop reason: SDUPTHER

## 2023-12-03 RX ORDER — METOPROLOL TARTRATE 50 MG/1
50 TABLET ORAL 2 TIMES DAILY
Qty: 60 TABLET | Refills: 0 | Status: SHIPPED | OUTPATIENT
Start: 2023-12-03 | End: 2023-12-03 | Stop reason: SDUPTHER

## 2023-12-03 RX ORDER — NITROGLYCERIN 0.4 MG/1
0.4 TABLET SUBLINGUAL EVERY 5 MIN PRN
Qty: 3 TABLET | Refills: 0 | Status: SHIPPED | OUTPATIENT
Start: 2023-12-03 | End: 2023-12-03 | Stop reason: SDUPTHER

## 2023-12-03 RX ORDER — FAMOTIDINE 20 MG/1
20 TABLET, FILM COATED ORAL 2 TIMES DAILY
Qty: 60 TABLET | Refills: 0 | Status: SHIPPED | OUTPATIENT
Start: 2023-12-03 | End: 2024-01-02

## 2023-12-03 RX ORDER — ONDANSETRON 8 MG/1
8 TABLET, ORALLY DISINTEGRATING ORAL EVERY 8 HOURS PRN
Qty: 15 TABLET | Refills: 0 | Status: SHIPPED | OUTPATIENT
Start: 2023-12-03 | End: 2023-12-12

## 2023-12-03 RX ORDER — PREDNISONE 10 MG/1
30 TABLET ORAL DAILY
Qty: 12 TABLET | Refills: 0 | Status: SHIPPED | OUTPATIENT
Start: 2023-12-03 | End: 2023-12-12

## 2023-12-03 RX ORDER — METOPROLOL TARTRATE 50 MG/1
50 TABLET ORAL 2 TIMES DAILY
Qty: 60 TABLET | Refills: 0 | Status: SHIPPED | OUTPATIENT
Start: 2023-12-03 | End: 2024-04-01

## 2023-12-03 RX ORDER — ISOSORBIDE MONONITRATE 30 MG/1
30 TABLET, EXTENDED RELEASE ORAL DAILY
Qty: 30 TABLET | Refills: 0 | Status: SHIPPED | OUTPATIENT
Start: 2023-12-03 | End: 2024-04-01

## 2023-12-03 RX ORDER — GUAIFENESIN/DEXTROMETHORPHAN 100-10MG/5
10 SYRUP ORAL EVERY 4 HOURS PRN
Qty: 100 ML | Refills: 0 | Status: SHIPPED | OUTPATIENT
Start: 2023-12-03 | End: 2023-12-03 | Stop reason: SDUPTHER

## 2023-12-03 RX ORDER — PREDNISONE 10 MG/1
30 TABLET ORAL DAILY
Qty: 12 TABLET | Refills: 0 | Status: SHIPPED | OUTPATIENT
Start: 2023-12-03 | End: 2023-12-03 | Stop reason: SDUPTHER

## 2023-12-03 RX ORDER — ONDANSETRON 8 MG/1
8 TABLET, ORALLY DISINTEGRATING ORAL EVERY 8 HOURS PRN
Qty: 15 TABLET | Refills: 0 | Status: SHIPPED | OUTPATIENT
Start: 2023-12-03 | End: 2023-12-03 | Stop reason: SDUPTHER

## 2023-12-03 RX ORDER — NYSTATIN 100000 [USP'U]/ML
500000 SUSPENSION ORAL 4 TIMES DAILY
Qty: 200 ML | Refills: 0 | Status: SHIPPED | OUTPATIENT
Start: 2023-12-03 | End: 2023-12-13

## 2023-12-03 RX ORDER — DILTIAZEM HYDROCHLORIDE 30 MG/1
30 TABLET, FILM COATED ORAL EVERY 6 HOURS
Qty: 120 TABLET | Refills: 0 | Status: SHIPPED | OUTPATIENT
Start: 2023-12-03 | End: 2023-12-03 | Stop reason: SDUPTHER

## 2023-12-03 RX ORDER — TRAMADOL HYDROCHLORIDE 50 MG/1
50 TABLET ORAL EVERY 8 HOURS PRN
Qty: 21 TABLET | Refills: 0 | Status: SHIPPED | OUTPATIENT
Start: 2023-12-03 | End: 2023-12-03 | Stop reason: SDUPTHER

## 2023-12-03 RX ORDER — ISOSORBIDE MONONITRATE 30 MG/1
30 TABLET, EXTENDED RELEASE ORAL DAILY
Qty: 30 TABLET | Refills: 0 | Status: SHIPPED | OUTPATIENT
Start: 2023-12-03 | End: 2023-12-03 | Stop reason: SDUPTHER

## 2023-12-03 RX ORDER — NITROGLYCERIN 0.4 MG/1
0.4 TABLET SUBLINGUAL EVERY 5 MIN PRN
Qty: 3 TABLET | Refills: 0 | Status: SHIPPED | OUTPATIENT
Start: 2023-12-03

## 2023-12-03 RX ORDER — BENZONATATE 200 MG/1
200 CAPSULE ORAL 3 TIMES DAILY
Qty: 30 CAPSULE | Refills: 0 | Status: SHIPPED | OUTPATIENT
Start: 2023-12-03 | End: 2023-12-12 | Stop reason: ALTCHOICE

## 2023-12-03 RX ADMIN — TRAMADOL HYDROCHLORIDE 50 MG: 50 TABLET, COATED ORAL at 12:12

## 2023-12-03 RX ADMIN — DILTIAZEM HYDROCHLORIDE 30 MG: 30 TABLET, FILM COATED ORAL at 11:12

## 2023-12-03 RX ADMIN — ISOSORBIDE MONONITRATE 30 MG: 30 TABLET, EXTENDED RELEASE ORAL at 08:12

## 2023-12-03 RX ADMIN — PREDNISONE 30 MG: 20 TABLET ORAL at 08:12

## 2023-12-03 RX ADMIN — MULTIPLE VITAMINS W/ MINERALS TAB 1 TABLET: TAB at 08:12

## 2023-12-03 RX ADMIN — DILTIAZEM HYDROCHLORIDE 30 MG: 30 TABLET, FILM COATED ORAL at 12:12

## 2023-12-03 RX ADMIN — DILTIAZEM HYDROCHLORIDE 30 MG: 30 TABLET, FILM COATED ORAL at 06:12

## 2023-12-03 RX ADMIN — BENZONATATE 200 MG: 100 CAPSULE ORAL at 08:12

## 2023-12-03 RX ADMIN — GABAPENTIN 100 MG: 100 CAPSULE ORAL at 08:12

## 2023-12-03 RX ADMIN — LEVOTHYROXINE SODIUM 88 MCG: 88 TABLET ORAL at 08:12

## 2023-12-03 RX ADMIN — LEVALBUTEROL HYDROCHLORIDE 1.25 MG: 1.25 SOLUTION RESPIRATORY (INHALATION) at 07:12

## 2023-12-03 RX ADMIN — NYSTATIN 500000 UNITS: 100000 SUSPENSION ORAL at 08:12

## 2023-12-03 RX ADMIN — FAMOTIDINE 20 MG: 20 TABLET ORAL at 08:12

## 2023-12-03 RX ADMIN — POTASSIUM CHLORIDE 20 MEQ: 1500 TABLET, EXTENDED RELEASE ORAL at 08:12

## 2023-12-03 RX ADMIN — FLUCONAZOLE 100 MG: 100 TABLET ORAL at 08:12

## 2023-12-03 RX ADMIN — ACETAMINOPHEN 650 MG: 325 TABLET ORAL at 12:12

## 2023-12-03 RX ADMIN — METOPROLOL TARTRATE 50 MG: 50 TABLET, FILM COATED ORAL at 08:12

## 2023-12-03 RX ADMIN — FUROSEMIDE 40 MG: 40 TABLET ORAL at 08:12

## 2023-12-03 NOTE — PLAN OF CARE
Problem: Adult Inpatient Plan of Care  Goal: Plan of Care Review  Outcome: Ongoing, Progressing  Flowsheets (Taken 12/3/2023 1119)  Plan of Care Reviewed With:   patient   daughter  Goal: Patient-Specific Goal (Individualized)  Outcome: Ongoing, Progressing  Flowsheets (Taken 12/3/2023 1119)  Anxieties, Fears or Concerns: Patient has no fears or concerns today and is ready for discharge.  Individualized Care Needs: Education of discharge medications.  Goal: Absence of Hospital-Acquired Illness or Injury  Outcome: Ongoing, Progressing  Intervention: Identify and Manage Fall Risk  Flowsheets (Taken 12/3/2023 1119)  Safety Promotion/Fall Prevention:   assistive device/personal item within reach   side rails raised x 3   lighting adjusted   nonskid shoes/socks when out of bed  Intervention: Prevent Skin Injury  Flowsheets (Taken 12/3/2023 1119)  Body Position:   neutral head position   neutral body alignment   sitting up in bed  Intervention: Prevent and Manage VTE (Venous Thromboembolism) Risk  Flowsheets (Taken 12/3/2023 1119)  Activity Management: Rolling - L1  Range of Motion: ROM (range of motion) performed  Intervention: Prevent Infection  Flowsheets (Taken 12/3/2023 1119)  Infection Prevention:   cohorting utilized   environmental surveillance performed   hand hygiene promoted   personal protective equipment utilized   rest/sleep promoted   single patient room provided  Goal: Optimal Comfort and Wellbeing  Outcome: Ongoing, Progressing  Goal: Readiness for Transition of Care  Outcome: Ongoing, Progressing     Problem: Bariatric Environmental Safety  Goal: Safety Maintained with Care  Outcome: Ongoing, Progressing     Problem: Diabetes Comorbidity  Goal: Blood Glucose Level Within Targeted Range  Outcome: Ongoing, Progressing     Problem: Infection  Goal: Absence of Infection Signs and Symptoms  Outcome: Ongoing, Progressing     Problem: Dysrhythmia  Goal: Normalized Cardiac Rhythm  Outcome: Ongoing,  Progressing     Problem: Fall Injury Risk  Goal: Absence of Fall and Fall-Related Injury  Outcome: Ongoing, Progressing  Intervention: Identify and Manage Contributors  Flowsheets (Taken 12/3/2023 1119)  Self-Care Promotion: independence encouraged  Medication Review/Management: medications reviewed  Intervention: Promote Injury-Free Environment  Flowsheets (Taken 12/3/2023 1119)  Safety Promotion/Fall Prevention:   assistive device/personal item within reach   side rails raised x 3   lighting adjusted   nonskid shoes/socks when out of bed     Problem: Pain Acute  Goal: Acceptable Pain Control and Functional Ability  Outcome: Ongoing, Progressing

## 2023-12-03 NOTE — PLAN OF CARE
Problem: Adult Inpatient Plan of Care  Goal: Plan of Care Review  Outcome: Ongoing, Progressing  Flowsheets (Taken 12/2/2023 2000)  Plan of Care Reviewed With:   patient   daughter  Goal: Patient-Specific Goal (Individualized)  Outcome: Ongoing, Progressing  Flowsheets (Taken 12/2/2023 2000)  Anxieties, Fears or Concerns: none  Individualized Care Needs: VSS  Goal: Absence of Hospital-Acquired Illness or Injury  Outcome: Ongoing, Progressing  Intervention: Identify and Manage Fall Risk  Flowsheets (Taken 12/2/2023 2000)  Safety Promotion/Fall Prevention:   assistive device/personal item within reach   bed alarm set   lighting adjusted   medications reviewed   nonskid shoes/socks when out of bed   side rails raised x 3   instructed to call staff for mobility  Intervention: Prevent Skin Injury  Flowsheets (Taken 12/2/2023 2000)  Body Position: position changed independently  Skin Protection:   adhesive use limited   incontinence pads utilized   tubing/devices free from skin contact  Intervention: Prevent and Manage VTE (Venous Thromboembolism) Risk  Flowsheets (Taken 12/2/2023 2000)  Activity Management: Ambulated -L4  VTE Prevention/Management:   ambulation promoted   fluids promoted  Range of Motion: active ROM (range of motion) encouraged  Intervention: Prevent Infection  Flowsheets (Taken 12/2/2023 2000)  Infection Prevention:   cohorting utilized   rest/sleep promoted   hand hygiene promoted  Goal: Optimal Comfort and Wellbeing  Outcome: Ongoing, Progressing  Intervention: Monitor Pain and Promote Comfort  Flowsheets (Taken 12/2/2023 2000)  Pain Management Interventions:   care clustered   medication offered   quiet environment facilitated  Intervention: Provide Person-Centered Care  Flowsheets (Taken 12/2/2023 2000)  Trust Relationship/Rapport:   care explained   emotional support provided   empathic listening provided   questions answered   questions encouraged     Problem: Diabetes Comorbidity  Goal: Blood  Glucose Level Within Targeted Range  Outcome: Ongoing, Progressing  Intervention: Monitor and Manage Glycemia  Flowsheets (Taken 12/2/2023 2000)  Glycemic Management: blood glucose monitored     Problem: Infection  Goal: Absence of Infection Signs and Symptoms  Outcome: Ongoing, Progressing  Intervention: Prevent or Manage Infection  Flowsheets (Taken 12/2/2023 2000)  Fever Reduction/Comfort Measures:   lightweight bedding   lightweight clothing  Infection Management: aseptic technique maintained  Isolation Precautions:   droplet   protective     Problem: Dysrhythmia  Goal: Normalized Cardiac Rhythm  12/3/2023 0118 by Lety Tanner, RN  Outcome: Ongoing, Progressing  12/3/2023 0116 by Lety Tanner, RN  Outcome: Ongoing, Progressing  Intervention: Monitor and Manage Cardiac Rhythm Effect  Flowsheets (Taken 12/2/2023 2000)  Dysrhythmia Management: other (see comments)  VTE Prevention/Management:   ambulation promoted   fluids promoted  Stabilization Measures: legs elevated     Problem: Fall Injury Risk  Goal: Absence of Fall and Fall-Related Injury  Outcome: Ongoing, Progressing  Intervention: Identify and Manage Contributors  Flowsheets (Taken 12/2/2023 2000)  Self-Care Promotion:   independence encouraged   BADL personal objects within reach   meal set-up provided   safe use of adaptive equipment encouraged  Medication Review/Management: medications reviewed  Intervention: Promote Injury-Free Environment  Flowsheets (Taken 12/2/2023 2000)  Safety Promotion/Fall Prevention:   assistive device/personal item within reach   bed alarm set   lighting adjusted   medications reviewed   nonskid shoes/socks when out of bed   side rails raised x 3   instructed to call staff for mobility     Problem: Pain Acute  Goal: Acceptable Pain Control and Functional Ability  Outcome: Ongoing, Progressing  Intervention: Develop Pain Management Plan  Flowsheets (Taken 12/2/2023 2000)  Pain Management Interventions:   care clustered    medication offered   quiet environment facilitated  Intervention: Prevent or Manage Pain  Flowsheets (Taken 12/2/2023 2000)  Sleep/Rest Enhancement: regular sleep/rest pattern promoted  Medication Review/Management: medications reviewed  Intervention: Optimize Psychosocial Wellbeing  Flowsheets (Taken 12/2/2023 2000)  Diversional Activities: television

## 2023-12-03 NOTE — DISCHARGE SUMMARY
[facility]  HOSPITAL MEDICINE - DISCHARGE SUMMARY    Patient Name: Vesta Lala  MRN: [id number]  Admission Date: 11/25/2023  Discharge Date: 12/03/2023  Hospital Length of Stay: 0 days  Discharge Provider: Lynn Wellington MD  Primary Care Provider: Stephanie Sotelo MD      HOSPITAL COURSE     79 yo female with PMH of HTN, spinal stenosis, HLD, CLAY (has not had a functioning CPAP for 6 months), AF and gastric sleeve who presents with complaint of wheezing and cough that started 6 days ago. She was prescribed a Z-johnson and an inhaler and completed the course. Symptoms did not improve so she followed up at a walk in clinic on Friday where she tested + for RSV. She was given a steroid shot but symptoms continued to worsen and she presented to our ED. At baseline patient lives alone and ambulates with a walker.    During the hospital stay she had developed atrial fibrillation with RVR, she has a known history of atrial fibrillation, Cardiology was consulted.       11/30  Patient with extensive thrush and wheezing. Will add po fluconazole and nystatin. Cont current plan of care, not ready for dc today, possibly tomorrow.      12/1  She has some chest pain today. Troponin wnl but it was exertional. Described as heavy. EKG shows A fib. St and T wave abnomrlaity, consider anterolateral ischemia. Will consult [facility] after discussing with family. Cannot dc home at this time.      12/2  [facility] recommended Imdur daily, NTG prn,   I will start Prednisone 30 mg po daily today     12/3  OK to dc home today. Will fu [facility] Wed for holter monitor. All new Rx sent to pharmacy. Will need to Fu w PCP also.   PHYSICAL EXAM     Most Recent Vital Signs:  Temp: 97.7 °F (36.5 °C) (12/03/23 0730)  Pulse: 100 (12/03/23 0730)  Resp: 18 (12/03/23 0715)  BP: 116/77 (12/03/23 0730)  SpO2: (!) 91 % (12/03/23 0730)   GENERAL: In no acute distress, afebrile  HEENT:  CHEST: Clear to auscultation bilaterally  HEART: S1, S2, no  appreciable murmur  ABDOMEN: Soft, nontender, BS +  MSK: Warm, no lower extremity edema, no clubbing or cyanosis  NEUROLOGIC: Alert and oriented x4, moving all extremities with good strength   INTEGUMENTARY:  PSYCHIATRY:          DISCHARGE DIAGNOSIS     Active Hospital Problems    Diagnosis  POA    *Acute bronchitis due to respiratory syncytial virus (RSV) [J20.5]  Yes      Resolved Hospital Problems   No resolved problems to display.      RSV bronchiolitis   Atrial fibrillation with RVR  Obstructive sleep apnea   Chest tightness 12/1  Chronic Stable Angina     History of: HTN, atrial fibrillation, Bariatric surgery        Xopenex 3 times daily.    Metoprolol 50 b.i.d., Xarelto 20.  Metoprolol IV push PRN.  Start prednisone 30 mg po daily  Start Imdur  Lasix on hold, resume TBD  Labs in am     Consult [facility]-EKG andTroponins done 12/1     DVT prophylaxis:  Xarelto 20      _____________________________________________________________________________      DISCHARGE MED REC     Current Discharge Medication List        START taking these medications    Details   benzonatate (TESSALON) 200 MG capsule Take 1 capsule (200 mg total) by mouth 3 (three) times daily. for 10 days  Qty: 30 capsule, Refills: 0      dextromethorphan-guaiFENesin  mg/5 ml (ROBITUSSIN-DM)  mg/5 mL liquid Take 10 mLs by mouth every 4 (four) hours as needed (cough).  Qty: 100 mL, Refills: 0      diltiaZEM (CARDIZEM) 30 MG tablet Take 1 tablet (30 mg total) by mouth every 6 (six) hours.  Qty: 120 tablet, Refills: 0      famotidine (PEPCID) 20 MG tablet Take 1 tablet (20 mg total) by mouth 2 (two) times daily.  Qty: 60 tablet, Refills: 0      fluconazole (DIFLUCAN) 100 MG tablet Take 1 tablet (100 mg total) by mouth once daily. for 14 days  Qty: 14 tablet, Refills: 0      isosorbide mononitrate (IMDUR) 30 MG 24 hr tablet Take 1 tablet (30 mg total) by mouth once daily.  Qty: 30 tablet, Refills: 0      metoprolol tartrate (LOPRESSOR) 50 MG tablet  Take 1 tablet (50 mg total) by mouth 2 (two) times daily.  Qty: 60 tablet, Refills: 0    Comments: .      nitroGLYCERIN (NITROSTAT) 0.4 MG SL tablet Place 1 tablet (0.4 mg total) under the tongue every 5 (five) minutes as needed for Chest pain (if no relief report to ER).  Qty: 3 tablet, Refills: 0      nystatin (MYCOSTATIN) 100,000 unit/mL suspension Take 5 mLs (500,000 Units total) by mouth 4 (four) times daily. for 10 days  Qty: 200 mL, Refills: 0      ondansetron (ZOFRAN-ODT) 8 MG TbDL Take 1 tablet (8 mg total) by mouth every 8 (eight) hours as needed (nausea).  Qty: 15 tablet, Refills: 0      potassium chloride SA (K-DUR,KLOR-CON) 20 MEQ tablet Take 1 tablet (20 mEq total) by mouth once daily.  Qty: 30 tablet, Refills: 0      predniSONE (DELTASONE) 10 MG tablet Take 3 tablets (30 mg total) by mouth once daily. for 4 days  Qty: 12 tablet, Refills: 0      traMADoL (ULTRAM) 50 mg tablet Take 1 tablet (50 mg total) by mouth every 8 (eight) hours as needed for Pain.  Qty: 21 tablet, Refills: 0    Comments: Quantity prescribed more than 7 day supply? No           CONTINUE these medications which have NOT CHANGED    Details   docusate sodium (COLACE) 100 MG capsule Take 100 mg by mouth 2 (two) times daily as needed for Constipation.      ergocalciferol (ERGOCALCIFEROL) 50,000 unit Cap Take 50,000 Units by mouth every 7 days.      furosemide (LASIX) 40 MG tablet TAKE 1 TABLET EVERY DAY  Qty: 90 tablet, Refills: 3      gabapentin (NEURONTIN) 100 MG capsule TAKE ONE CAPSULE BY MOUTH EVERY MORNING AND TAKE TWO CAPSULES BY MOUTH EVERY DAY AT BEDTIME  Qty: 270 capsule, Refills: 3      levothyroxine (SYNTHROID) 88 MCG tablet Take 1 tablet (88 mcg total) by mouth once daily.  Qty: 90 tablet, Refills: 1      multivitamin with minerals tablet Take 1 tablet by mouth once daily.      rosuvastatin (CRESTOR) 20 MG tablet Take 1 tablet (20 mg total) by mouth once daily.  Qty: 90 tablet, Refills: 1      XARELTO 20 mg Tab Take 20 mg  by mouth Daily.      fluticasone propionate (FLONASE) 50 mcg/actuation nasal spray USE 2 SPRAY(S) IN EACH NOSTRIL ONCE DAILY FOR 10 DAYS           STOP taking these medications       amLODIPine (NORVASC) 5 MG tablet Comments:   Reason for Stopping:         metoprolol succinate (TOPROL-XL) 50 MG 24 hr tablet Comments:   Reason for Stopping:                  CONSULTS     Consults (From admission, onward)          Status Ordering Provider     Inpatient consult to Cardiology  Once        Provider:  Laz Jama MD    Acknowledged LYNN FERNANDEZ     Inpatient consult to Cardiology  Once        Provider:  Laz Jama MD    Completed DENIZ BREWER     Inpatient consult to Social Work/Case Management  Once        Provider:  (Not yet assigned)    Acknowledged REYES, THAIRY G              FOLLOW UP      Follow-up Information       Stephanie Sotelo MD Follow up.    Specialty: Family Medicine  Contact information:  [address] [facility] [address]  [phone]               Laz Jama MD. Go on 12/6/2023.    Specialties: Cardiovascular Disease, Cardiology  Why: patient has appointment for hospital followup and discussion regarding holter monitor on Wednesday 12/06/2023 at 9:10 AM. Nurse spoke to Ирина at [facility]'s office.  Contact information:  [facility] [facility] [address] [phone]                                 DISCHARGE INSTRUCTIONS     Explained in detail to the patient about the discharge plan, medications, and follow-up visits. Pt understands and agrees with the treatment plan.  Discharged Condition: stable  Diet as tolerated  Activities as tolerated  Discharge to: Home-Health Care [facility]    TIME SPENT ON DISCHARGE   35 minutes        Lynn Fernandez MD  Internal Medicine  Department of [facility] Medicine  [facility]      This document was created using electronic dictation services.  Please excuse any errors that may  have been made.  Contact me if any questions regarding documentation to clarify verbiage.

## 2023-12-04 ENCOUNTER — PATIENT OUTREACH (OUTPATIENT)
Dept: ADMINISTRATIVE | Facility: CLINIC | Age: 78
End: 2023-12-04
Payer: MEDICARE

## 2023-12-04 NOTE — PROGRESS NOTES
C3 nurse spoke with Vesta Lala for a TCC post hospital discharge follow up call. The patient has a scheduled HOSFU appointment with Stephanie Sotelo MD on 12/12/23 @ 1:30.

## 2023-12-19 DIAGNOSIS — I20.89 STABLE ANGINA: Primary | ICD-10-CM

## 2024-01-12 DIAGNOSIS — I20.89 STABLE ANGINA: Primary | ICD-10-CM

## 2024-03-31 NOTE — PLAN OF CARE
[facility]  Discharge Final Note    Primary Care Provider: Stephanie Sotelo MD    Expected Discharge Date: 12/3/2023    Final Discharge Note (most recent)       Final Note - 03/30/24 2221          Final Note    Assessment Type Final Discharge Note     Anticipated Discharge Disposition Home or Self Care     What phone number can be called within the next 1-3 days to see how you are doing after discharge? [phone]     Hospital Resources/Appts/Education Provided Provided patient/caregiver with written discharge plan information        Post-Acute Status    Discharge Delays None known at this time                     Important Message from Medicare             Contact Info       Stephanie Sotelo MD   Specialty: Family Medicine   Relationship: PCP - General    [address] [place] [id number]   Phone: [phone]       Next Steps: Go on 12/12/2023    Instructions: Follow up with Stephanie Sotelo MD on Tuesday, December 12, 2023 @ 1:30 pm.  Spoke to [facility].  And also informed patient.    Laz Jama MD   Specialty: Cardiovascular Disease, Cardiology    [address] [facility] [address]   Phone: [phone]       Next Steps: Go on 12/6/2023    Instructions: Patient has appointment for hospital followup and discussion regarding holter monitor on Wednesday 12/06/2023 at 9:10 AM. Nurse spoke to Ирина at [facility]'s office.

## 2024-04-01 PROBLEM — I50.9 CONGESTIVE HEART FAILURE, UNSPECIFIED HF CHRONICITY, UNSPECIFIED HEART FAILURE TYPE: Status: RESOLVED | Noted: 2023-09-28 | Resolved: 2024-04-01

## 2024-04-15 ENCOUNTER — HOSPITAL ENCOUNTER (OUTPATIENT)
Dept: RADIOLOGY | Facility: HOSPITAL | Age: 79
Discharge: HOME | End: 2024-04-15
Attending: FAMILY MEDICINE
Payer: MEDICARE

## 2024-04-15 DIAGNOSIS — Z12.31 SCREENING MAMMOGRAM FOR BREAST CANCER: ICD-10-CM

## 2024-04-15 PROCEDURE — 77063 BREAST TOMOSYNTHESIS BI: CPT | Mod: 26,,, | Performed by: RADIOLOGY

## 2024-04-15 PROCEDURE — 77063 BREAST TOMOSYNTHESIS BI: CPT | Mod: TC

## 2024-04-15 PROCEDURE — 77067 SCR MAMMO BI INCL CAD: CPT | Mod: 26,,, | Performed by: RADIOLOGY

## 2024-08-27 ENCOUNTER — LAB VISIT (OUTPATIENT)
Dept: LAB | Facility: HOSPITAL | Age: 79
End: 2024-08-27
Attending: NURSE PRACTITIONER
Payer: MEDICARE

## 2024-08-27 DIAGNOSIS — I48.0 PAROXYSMAL ATRIAL FIBRILLATION: ICD-10-CM

## 2024-08-27 DIAGNOSIS — I10 ESSENTIAL HYPERTENSION, MALIGNANT: Primary | ICD-10-CM

## 2024-08-27 LAB
ANION GAP SERPL CALC-SCNC: 5 MEQ/L
BUN SERPL-MCNC: 12.2 MG/DL (ref 9.8–20.1)
CALCIUM SERPL-MCNC: 9.5 MG/DL (ref 8.4–10.2)
CHLORIDE SERPL-SCNC: 108 MMOL/L (ref 98–107)
CO2 SERPL-SCNC: 31 MMOL/L (ref 23–31)
CREAT SERPL-MCNC: 0.72 MG/DL (ref 0.55–1.02)
CREAT/UREA NIT SERPL: 17
GFR SERPLBLD CREATININE-BSD FMLA CKD-EPI: >60 ML/MIN/1.73/M2
GLUCOSE SERPL-MCNC: 107 MG/DL (ref 82–115)
POTASSIUM SERPL-SCNC: 4.4 MMOL/L (ref 3.5–5.1)
SODIUM SERPL-SCNC: 144 MMOL/L (ref 136–145)

## 2024-08-27 PROCEDURE — 80048 BASIC METABOLIC PNL TOTAL CA: CPT

## 2024-08-27 PROCEDURE — 36415 COLL VENOUS BLD VENIPUNCTURE: CPT

## 2024-10-07 ENCOUNTER — LAB VISIT (OUTPATIENT)
Dept: LAB | Facility: HOSPITAL | Age: 79
End: 2024-10-07
Attending: FAMILY MEDICINE
Payer: MEDICARE

## 2024-10-07 DIAGNOSIS — E11.65 TYPE 2 DIABETES MELLITUS WITH HYPERGLYCEMIA, WITHOUT LONG-TERM CURRENT USE OF INSULIN: ICD-10-CM

## 2024-10-07 LAB
BACTERIA #/AREA URNS AUTO: NORMAL /HPF
BILIRUB UR QL STRIP.AUTO: NEGATIVE
CLARITY UR: CLEAR
COLOR UR AUTO: YELLOW
CREAT UR-MCNC: 194.7 MG/DL (ref 45–106)
EST. AVERAGE GLUCOSE BLD GHB EST-MCNC: 116.9 MG/DL
GLUCOSE UR QL STRIP: NEGATIVE
HBA1C MFR BLD: 5.7 %
HGB UR QL STRIP: ABNORMAL
KETONES UR QL STRIP: ABNORMAL
LEUKOCYTE ESTERASE UR QL STRIP: NEGATIVE
MICROALBUMIN UR-MCNC: 18.2 UG/ML
MICROALBUMIN/CREAT RATIO PNL UR: 9.3 MG/GM CR (ref 0–30)
NITRITE UR QL STRIP: NEGATIVE
PH UR STRIP: 5.5 [PH]
PROT UR QL STRIP: NEGATIVE
RBC #/AREA URNS AUTO: NORMAL /HPF
SP GR UR STRIP.AUTO: >=1.03 (ref 1–1.03)
SQUAMOUS #/AREA URNS AUTO: NORMAL /HPF
UROBILINOGEN UR STRIP-ACNC: 1
WBC #/AREA URNS AUTO: NORMAL /HPF

## 2024-10-07 PROCEDURE — 36415 COLL VENOUS BLD VENIPUNCTURE: CPT

## 2024-10-07 PROCEDURE — 81003 URINALYSIS AUTO W/O SCOPE: CPT

## 2024-10-07 PROCEDURE — 83036 HEMOGLOBIN GLYCOSYLATED A1C: CPT

## 2024-10-07 PROCEDURE — 82570 ASSAY OF URINE CREATININE: CPT

## 2024-10-07 PROCEDURE — 81001 URINALYSIS AUTO W/SCOPE: CPT

## 2025-02-25 ENCOUNTER — HOSPITAL ENCOUNTER (EMERGENCY)
Facility: HOSPITAL | Age: 80
Discharge: ANOTHER HOSPITAL | End: 2025-02-26
Attending: SPECIALIST
Payer: MEDICARE

## 2025-02-25 DIAGNOSIS — R06.02 SHORTNESS OF BREATH: ICD-10-CM

## 2025-02-25 DIAGNOSIS — I21.4 NSTEMI (NON-ST ELEVATED MYOCARDIAL INFARCTION): Primary | ICD-10-CM

## 2025-02-25 DIAGNOSIS — R03.0 ELEVATED BLOOD PRESSURE READING: ICD-10-CM

## 2025-02-25 LAB
ALBUMIN SERPL-MCNC: 3.4 G/DL (ref 3.4–4.8)
ALBUMIN/GLOB SERPL: 1.1 RATIO (ref 1.1–2)
ALP SERPL-CCNC: 79 UNIT/L (ref 40–150)
ALT SERPL-CCNC: 12 UNIT/L (ref 0–55)
ANION GAP SERPL CALC-SCNC: 10 MEQ/L
AST SERPL-CCNC: 23 UNIT/L (ref 5–34)
BASOPHILS # BLD AUTO: 0.05 X10(3)/MCL
BASOPHILS NFR BLD AUTO: 0.6 %
BILIRUB SERPL-MCNC: 0.3 MG/DL
BNP BLD-MCNC: 371.9 PG/ML
BUN SERPL-MCNC: 19.2 MG/DL (ref 9.8–20.1)
CALCIUM SERPL-MCNC: 9.3 MG/DL (ref 8.4–10.2)
CHLORIDE SERPL-SCNC: 111 MMOL/L (ref 98–107)
CO2 SERPL-SCNC: 25 MMOL/L (ref 23–31)
CREAT SERPL-MCNC: 0.78 MG/DL (ref 0.55–1.02)
CREAT/UREA NIT SERPL: 25
EOSINOPHIL # BLD AUTO: 0.11 X10(3)/MCL (ref 0–0.9)
EOSINOPHIL NFR BLD AUTO: 1.3 %
ERYTHROCYTE [DISTWIDTH] IN BLOOD BY AUTOMATED COUNT: 14.1 % (ref 11.5–17)
GFR SERPLBLD CREATININE-BSD FMLA CKD-EPI: >60 ML/MIN/1.73/M2
GLOBULIN SER-MCNC: 3.1 GM/DL (ref 2.4–3.5)
GLUCOSE SERPL-MCNC: 142 MG/DL (ref 82–115)
HCT VFR BLD AUTO: 38.8 % (ref 37–47)
HGB BLD-MCNC: 12.1 G/DL (ref 12–16)
IMM GRANULOCYTES # BLD AUTO: 0 X10(3)/MCL (ref 0–0.04)
IMM GRANULOCYTES NFR BLD AUTO: 0 %
LYMPHOCYTES # BLD AUTO: 2.74 X10(3)/MCL (ref 0.6–4.6)
LYMPHOCYTES NFR BLD AUTO: 33.6 %
MCH RBC QN AUTO: 28.5 PG (ref 27–31)
MCHC RBC AUTO-ENTMCNC: 31.2 G/DL (ref 33–36)
MCV RBC AUTO: 91.3 FL (ref 80–94)
MONOCYTES # BLD AUTO: 0.47 X10(3)/MCL (ref 0.1–1.3)
MONOCYTES NFR BLD AUTO: 5.8 %
NEUTROPHILS # BLD AUTO: 4.78 X10(3)/MCL (ref 2.1–9.2)
NEUTROPHILS NFR BLD AUTO: 58.7 %
PLATELET # BLD AUTO: 230 X10(3)/MCL (ref 130–400)
PMV BLD AUTO: 10.9 FL (ref 7.4–10.4)
POTASSIUM SERPL-SCNC: 4.1 MMOL/L (ref 3.5–5.1)
PROT SERPL-MCNC: 6.5 GM/DL (ref 5.8–7.6)
RBC # BLD AUTO: 4.25 X10(6)/MCL (ref 4.2–5.4)
SODIUM SERPL-SCNC: 146 MMOL/L (ref 136–145)
TROPONIN I SERPL-MCNC: 0.32 NG/ML (ref 0–0.04)
TROPONIN I SERPL-MCNC: 0.36 NG/ML (ref 0–0.04)
WBC # BLD AUTO: 8.15 X10(3)/MCL (ref 4.5–11.5)

## 2025-02-25 PROCEDURE — 94640 AIRWAY INHALATION TREATMENT: CPT

## 2025-02-25 PROCEDURE — 80053 COMPREHEN METABOLIC PANEL: CPT | Performed by: SPECIALIST

## 2025-02-25 PROCEDURE — 85025 COMPLETE CBC W/AUTO DIFF WBC: CPT | Performed by: SPECIALIST

## 2025-02-25 PROCEDURE — 25000242 PHARM REV CODE 250 ALT 637 W/ HCPCS: Performed by: SPECIALIST

## 2025-02-25 PROCEDURE — 25500020 PHARM REV CODE 255: Performed by: SPECIALIST

## 2025-02-25 PROCEDURE — 84484 ASSAY OF TROPONIN QUANT: CPT | Performed by: SPECIALIST

## 2025-02-25 PROCEDURE — 93010 ELECTROCARDIOGRAM REPORT: CPT | Mod: ,,, | Performed by: INTERNAL MEDICINE

## 2025-02-25 PROCEDURE — 93005 ELECTROCARDIOGRAM TRACING: CPT

## 2025-02-25 PROCEDURE — 99285 EMERGENCY DEPT VISIT HI MDM: CPT | Mod: 25

## 2025-02-25 PROCEDURE — 83880 ASSAY OF NATRIURETIC PEPTIDE: CPT | Performed by: SPECIALIST

## 2025-02-25 RX ORDER — NAPROXEN SODIUM 220 MG/1
324 TABLET, FILM COATED ORAL ONCE
Status: COMPLETED | OUTPATIENT
Start: 2025-02-26 | End: 2025-02-26

## 2025-02-25 RX ORDER — ENOXAPARIN SODIUM 150 MG/ML
1 INJECTION SUBCUTANEOUS
Status: COMPLETED | OUTPATIENT
Start: 2025-02-26 | End: 2025-02-26

## 2025-02-25 RX ORDER — IPRATROPIUM BROMIDE AND ALBUTEROL SULFATE 2.5; .5 MG/3ML; MG/3ML
3 SOLUTION RESPIRATORY (INHALATION)
Status: COMPLETED | OUTPATIENT
Start: 2025-02-25 | End: 2025-02-25

## 2025-02-25 RX ADMIN — IPRATROPIUM BROMIDE AND ALBUTEROL SULFATE 3 ML: .5; 3 SOLUTION RESPIRATORY (INHALATION) at 09:02

## 2025-02-25 RX ADMIN — IOHEXOL 100 ML: 350 INJECTION, SOLUTION INTRAVENOUS at 10:02

## 2025-02-26 ENCOUNTER — HOSPITAL ENCOUNTER (INPATIENT)
Facility: HOSPITAL | Age: 80
LOS: 2 days | Discharge: HOME HEALTH | DRG: 280 | End: 2025-02-28
Attending: STUDENT IN AN ORGANIZED HEALTH CARE EDUCATION/TRAINING PROGRAM | Admitting: INTERNAL MEDICINE
Payer: MEDICARE

## 2025-02-26 VITALS
WEIGHT: 260 LBS | HEIGHT: 64 IN | TEMPERATURE: 98 F | HEART RATE: 70 BPM | SYSTOLIC BLOOD PRESSURE: 158 MMHG | DIASTOLIC BLOOD PRESSURE: 80 MMHG | OXYGEN SATURATION: 95 % | BODY MASS INDEX: 44.39 KG/M2 | RESPIRATION RATE: 17 BRPM

## 2025-02-26 DIAGNOSIS — E03.8 OTHER SPECIFIED HYPOTHYROIDISM: ICD-10-CM

## 2025-02-26 DIAGNOSIS — E66.01 MORBID OBESITY: ICD-10-CM

## 2025-02-26 DIAGNOSIS — G47.33 OBSTRUCTIVE SLEEP APNEA SYNDROME: ICD-10-CM

## 2025-02-26 DIAGNOSIS — I48.0 PAROXYSMAL ATRIAL FIBRILLATION: ICD-10-CM

## 2025-02-26 DIAGNOSIS — I10 PRIMARY HYPERTENSION: ICD-10-CM

## 2025-02-26 DIAGNOSIS — I21.4 NSTEMI (NON-ST ELEVATED MYOCARDIAL INFARCTION): Primary | ICD-10-CM

## 2025-02-26 DIAGNOSIS — R07.9 CHEST PAIN: ICD-10-CM

## 2025-02-26 DIAGNOSIS — I25.10 CAD (CORONARY ARTERY DISEASE): ICD-10-CM

## 2025-02-26 DIAGNOSIS — E11.65 TYPE 2 DIABETES MELLITUS WITH HYPERGLYCEMIA, WITHOUT LONG-TERM CURRENT USE OF INSULIN: ICD-10-CM

## 2025-02-26 LAB
APTT PPP: 34.8 SECONDS (ref 23.2–33.7)
B PERT.PT PRMT NPH QL NAA+NON-PROBE: NOT DETECTED
BASOPHILS # BLD AUTO: 0.05 X10(3)/MCL
BASOPHILS NFR BLD AUTO: 0.7 %
C PNEUM DNA NPH QL NAA+NON-PROBE: NOT DETECTED
CATH EF QUANTITATIVE: 55 %
EOSINOPHIL # BLD AUTO: 0.18 X10(3)/MCL (ref 0–0.9)
EOSINOPHIL NFR BLD AUTO: 2.6 %
ERYTHROCYTE [DISTWIDTH] IN BLOOD BY AUTOMATED COUNT: 14.2 % (ref 11.5–17)
EST. AVERAGE GLUCOSE BLD GHB EST-MCNC: 154.2 MG/DL
FLUAV AG UPPER RESP QL IA.RAPID: NOT DETECTED
FLUBV AG UPPER RESP QL IA.RAPID: NOT DETECTED
HADV DNA NPH QL NAA+NON-PROBE: NOT DETECTED
HBA1C MFR BLD: 7 %
HCOV 229E RNA NPH QL NAA+NON-PROBE: NOT DETECTED
HCOV HKU1 RNA NPH QL NAA+NON-PROBE: NOT DETECTED
HCOV NL63 RNA NPH QL NAA+NON-PROBE: NOT DETECTED
HCOV OC43 RNA NPH QL NAA+NON-PROBE: NOT DETECTED
HCT VFR BLD AUTO: 37.1 % (ref 37–47)
HGB BLD-MCNC: 11.6 G/DL (ref 12–16)
HMPV RNA NPH QL NAA+NON-PROBE: NOT DETECTED
HPIV1 RNA NPH QL NAA+NON-PROBE: NOT DETECTED
HPIV2 RNA NPH QL NAA+NON-PROBE: NOT DETECTED
HPIV3 RNA NPH QL NAA+NON-PROBE: NOT DETECTED
HPIV4 RNA NPH QL NAA+NON-PROBE: NOT DETECTED
IMM GRANULOCYTES # BLD AUTO: 0.02 X10(3)/MCL (ref 0–0.04)
IMM GRANULOCYTES NFR BLD AUTO: 0.3 %
INR PPP: 1.3
LYMPHOCYTES # BLD AUTO: 2.75 X10(3)/MCL (ref 0.6–4.6)
LYMPHOCYTES NFR BLD AUTO: 40.3 %
M PNEUMO DNA NPH QL NAA+NON-PROBE: NOT DETECTED
MCH RBC QN AUTO: 28.4 PG (ref 27–31)
MCHC RBC AUTO-ENTMCNC: 31.3 G/DL (ref 33–36)
MCV RBC AUTO: 90.7 FL (ref 80–94)
MONOCYTES # BLD AUTO: 0.36 X10(3)/MCL (ref 0.1–1.3)
MONOCYTES NFR BLD AUTO: 5.3 %
MRSA PCR SCRN (OHS): NOT DETECTED
NEUTROPHILS # BLD AUTO: 3.46 X10(3)/MCL (ref 2.1–9.2)
NEUTROPHILS NFR BLD AUTO: 50.8 %
NRBC BLD AUTO-RTO: 0 %
OHS QRS DURATION: 86 MS
OHS QTC CALCULATION: 487 MS
PLATELET # BLD AUTO: 188 X10(3)/MCL (ref 130–400)
PMV BLD AUTO: 11 FL (ref 7.4–10.4)
PROTHROMBIN TIME: 16 SECONDS (ref 12.5–14.5)
RBC # BLD AUTO: 4.09 X10(6)/MCL (ref 4.2–5.4)
RSV A 5' UTR RNA NPH QL NAA+PROBE: NOT DETECTED
RSV RNA NPH QL NAA+NON-PROBE: NOT DETECTED
RV+EV RNA NPH QL NAA+NON-PROBE: NOT DETECTED
SARS-COV-2 RNA RESP QL NAA+PROBE: NOT DETECTED
TROPONIN I SERPL-MCNC: 0.31 NG/ML (ref 0–0.04)
WBC # BLD AUTO: 6.82 X10(3)/MCL (ref 4.5–11.5)

## 2025-02-26 PROCEDURE — B2111ZZ FLUOROSCOPY OF MULTIPLE CORONARY ARTERIES USING LOW OSMOLAR CONTRAST: ICD-10-PCS | Performed by: INTERNAL MEDICINE

## 2025-02-26 PROCEDURE — 87641 MR-STAPH DNA AMP PROBE: CPT | Performed by: INTERNAL MEDICINE

## 2025-02-26 PROCEDURE — 83036 HEMOGLOBIN GLYCOSYLATED A1C: CPT | Performed by: INTERNAL MEDICINE

## 2025-02-26 PROCEDURE — C1894 INTRO/SHEATH, NON-LASER: HCPCS | Performed by: INTERNAL MEDICINE

## 2025-02-26 PROCEDURE — 96372 THER/PROPH/DIAG INJ SC/IM: CPT | Performed by: SPECIALIST

## 2025-02-26 PROCEDURE — 99900035 HC TECH TIME PER 15 MIN (STAT)

## 2025-02-26 PROCEDURE — 84484 ASSAY OF TROPONIN QUANT: CPT

## 2025-02-26 PROCEDURE — 25000003 PHARM REV CODE 250: Performed by: INTERNAL MEDICINE

## 2025-02-26 PROCEDURE — 21400001 HC TELEMETRY ROOM

## 2025-02-26 PROCEDURE — 94799 UNLISTED PULMONARY SVC/PX: CPT

## 2025-02-26 PROCEDURE — 87486 CHLMYD PNEUM DNA AMP PROBE: CPT | Performed by: INTERNAL MEDICINE

## 2025-02-26 PROCEDURE — 85730 THROMBOPLASTIN TIME PARTIAL: CPT | Performed by: NURSE PRACTITIONER

## 2025-02-26 PROCEDURE — 36415 COLL VENOUS BLD VENIPUNCTURE: CPT | Performed by: NURSE PRACTITIONER

## 2025-02-26 PROCEDURE — 99152 MOD SED SAME PHYS/QHP 5/>YRS: CPT | Performed by: INTERNAL MEDICINE

## 2025-02-26 PROCEDURE — 0241U COVID/RSV/FLU A&B PCR: CPT | Performed by: INTERNAL MEDICINE

## 2025-02-26 PROCEDURE — 25000003 PHARM REV CODE 250: Performed by: NURSE PRACTITIONER

## 2025-02-26 PROCEDURE — 93799 UNLISTED CV SVC/PROCEDURE: CPT | Performed by: INTERNAL MEDICINE

## 2025-02-26 PROCEDURE — 11000001 HC ACUTE MED/SURG PRIVATE ROOM

## 2025-02-26 PROCEDURE — 25000003 PHARM REV CODE 250: Performed by: SPECIALIST

## 2025-02-26 PROCEDURE — 4A023N7 MEASUREMENT OF CARDIAC SAMPLING AND PRESSURE, LEFT HEART, PERCUTANEOUS APPROACH: ICD-10-PCS | Performed by: INTERNAL MEDICINE

## 2025-02-26 PROCEDURE — C1760 CLOSURE DEV, VASC: HCPCS | Performed by: INTERNAL MEDICINE

## 2025-02-26 PROCEDURE — C1769 GUIDE WIRE: HCPCS | Performed by: INTERNAL MEDICINE

## 2025-02-26 PROCEDURE — 99153 MOD SED SAME PHYS/QHP EA: CPT | Performed by: INTERNAL MEDICINE

## 2025-02-26 PROCEDURE — 27000221 HC OXYGEN, UP TO 24 HOURS

## 2025-02-26 PROCEDURE — B2151ZZ FLUOROSCOPY OF LEFT HEART USING LOW OSMOLAR CONTRAST: ICD-10-PCS | Performed by: INTERNAL MEDICINE

## 2025-02-26 PROCEDURE — C1887 CATHETER, GUIDING: HCPCS | Performed by: INTERNAL MEDICINE

## 2025-02-26 PROCEDURE — 25000003 PHARM REV CODE 250

## 2025-02-26 PROCEDURE — 25500020 PHARM REV CODE 255: Performed by: INTERNAL MEDICINE

## 2025-02-26 PROCEDURE — 63600175 PHARM REV CODE 636 W HCPCS: Performed by: INTERNAL MEDICINE

## 2025-02-26 PROCEDURE — 63600175 PHARM REV CODE 636 W HCPCS: Performed by: SPECIALIST

## 2025-02-26 PROCEDURE — 85610 PROTHROMBIN TIME: CPT | Performed by: NURSE PRACTITIONER

## 2025-02-26 PROCEDURE — 4A033BC MEASUREMENT OF ARTERIAL PRESSURE, CORONARY, PERCUTANEOUS APPROACH: ICD-10-PCS | Performed by: INTERNAL MEDICINE

## 2025-02-26 PROCEDURE — 94760 N-INVAS EAR/PLS OXIMETRY 1: CPT

## 2025-02-26 PROCEDURE — 93458 L HRT ARTERY/VENTRICLE ANGIO: CPT | Performed by: INTERNAL MEDICINE

## 2025-02-26 PROCEDURE — 85025 COMPLETE CBC W/AUTO DIFF WBC: CPT | Performed by: NURSE PRACTITIONER

## 2025-02-26 PROCEDURE — 36415 COLL VENOUS BLD VENIPUNCTURE: CPT

## 2025-02-26 PROCEDURE — 27201423 OPTIME MED/SURG SUP & DEVICES STERILE SUPPLY: Performed by: INTERNAL MEDICINE

## 2025-02-26 RX ORDER — SODIUM CHLORIDE 9 MG/ML
INJECTION, SOLUTION INTRAVENOUS CONTINUOUS
Status: ACTIVE | OUTPATIENT
Start: 2025-02-26 | End: 2025-02-26

## 2025-02-26 RX ORDER — ALBUTEROL SULFATE 90 UG/1
2 INHALANT RESPIRATORY (INHALATION) EVERY 6 HOURS PRN
Status: DISCONTINUED | OUTPATIENT
Start: 2025-02-26 | End: 2025-02-28 | Stop reason: HOSPADM

## 2025-02-26 RX ORDER — ZOLPIDEM TARTRATE 5 MG/1
5 TABLET ORAL NIGHTLY PRN
Status: DISCONTINUED | OUTPATIENT
Start: 2025-02-26 | End: 2025-02-26 | Stop reason: HOSPADM

## 2025-02-26 RX ORDER — PROTAMINE SULFATE 10 MG/ML
INJECTION, SOLUTION INTRAVENOUS
Status: DISCONTINUED | OUTPATIENT
Start: 2025-02-26 | End: 2025-02-26 | Stop reason: HOSPADM

## 2025-02-26 RX ORDER — MIDAZOLAM HYDROCHLORIDE 1 MG/ML
INJECTION INTRAMUSCULAR; INTRAVENOUS
Status: DISCONTINUED | OUTPATIENT
Start: 2025-02-26 | End: 2025-02-26 | Stop reason: HOSPADM

## 2025-02-26 RX ORDER — NITROGLYCERIN 20 MG/100ML
INJECTION INTRAVENOUS
Status: DISCONTINUED | OUTPATIENT
Start: 2025-02-26 | End: 2025-02-26 | Stop reason: HOSPADM

## 2025-02-26 RX ORDER — ONDANSETRON HYDROCHLORIDE 2 MG/ML
INJECTION, SOLUTION INTRAVENOUS
Status: DISCONTINUED | OUTPATIENT
Start: 2025-02-26 | End: 2025-02-26 | Stop reason: HOSPADM

## 2025-02-26 RX ORDER — ALPRAZOLAM 0.25 MG/1
0.25 TABLET ORAL 3 TIMES DAILY PRN
Status: DISCONTINUED | OUTPATIENT
Start: 2025-02-26 | End: 2025-02-26 | Stop reason: HOSPADM

## 2025-02-26 RX ORDER — FENTANYL CITRATE 50 UG/ML
INJECTION, SOLUTION INTRAMUSCULAR; INTRAVENOUS
Status: DISCONTINUED | OUTPATIENT
Start: 2025-02-26 | End: 2025-02-26 | Stop reason: HOSPADM

## 2025-02-26 RX ORDER — LEVOTHYROXINE SODIUM 88 UG/1
88 TABLET ORAL
Status: DISCONTINUED | OUTPATIENT
Start: 2025-02-27 | End: 2025-02-28 | Stop reason: HOSPADM

## 2025-02-26 RX ORDER — METOPROLOL TARTRATE 25 MG/1
25 TABLET, FILM COATED ORAL 2 TIMES DAILY
Status: DISCONTINUED | OUTPATIENT
Start: 2025-02-26 | End: 2025-02-26 | Stop reason: HOSPADM

## 2025-02-26 RX ORDER — SODIUM CHLORIDE 0.9 % (FLUSH) 0.9 %
10 SYRINGE (ML) INJECTION
Status: DISCONTINUED | OUTPATIENT
Start: 2025-02-26 | End: 2025-02-28 | Stop reason: HOSPADM

## 2025-02-26 RX ORDER — ONDANSETRON 4 MG/1
8 TABLET, ORALLY DISINTEGRATING ORAL EVERY 8 HOURS PRN
Status: DISCONTINUED | OUTPATIENT
Start: 2025-02-26 | End: 2025-02-28 | Stop reason: HOSPADM

## 2025-02-26 RX ORDER — METOPROLOL TARTRATE 50 MG/1
50 TABLET ORAL DAILY
Status: DISCONTINUED | OUTPATIENT
Start: 2025-02-27 | End: 2025-02-27

## 2025-02-26 RX ORDER — ENOXAPARIN SODIUM 150 MG/ML
1 INJECTION SUBCUTANEOUS EVERY 12 HOURS
Status: DISCONTINUED | OUTPATIENT
Start: 2025-02-26 | End: 2025-02-26

## 2025-02-26 RX ORDER — ALUMINUM HYDROXIDE, MAGNESIUM HYDROXIDE, AND SIMETHICONE 1200; 120; 1200 MG/30ML; MG/30ML; MG/30ML
30 SUSPENSION ORAL 4 TIMES DAILY PRN
Status: DISCONTINUED | OUTPATIENT
Start: 2025-02-26 | End: 2025-02-28 | Stop reason: HOSPADM

## 2025-02-26 RX ORDER — VALSARTAN 160 MG/1
160 TABLET ORAL 2 TIMES DAILY
Status: DISCONTINUED | OUTPATIENT
Start: 2025-02-26 | End: 2025-02-26 | Stop reason: HOSPADM

## 2025-02-26 RX ORDER — IBUPROFEN 200 MG
16 TABLET ORAL
Status: DISCONTINUED | OUTPATIENT
Start: 2025-02-26 | End: 2025-02-28 | Stop reason: HOSPADM

## 2025-02-26 RX ORDER — TALC
6 POWDER (GRAM) TOPICAL NIGHTLY PRN
Status: DISCONTINUED | OUTPATIENT
Start: 2025-02-26 | End: 2025-02-28 | Stop reason: HOSPADM

## 2025-02-26 RX ORDER — ATROPINE SULFATE 0.1 MG/ML
INJECTION INTRAVENOUS
Status: DISCONTINUED | OUTPATIENT
Start: 2025-02-26 | End: 2025-02-26 | Stop reason: HOSPADM

## 2025-02-26 RX ORDER — VERAPAMIL HYDROCHLORIDE 2.5 MG/ML
INJECTION, SOLUTION INTRAVENOUS
Status: DISCONTINUED | OUTPATIENT
Start: 2025-02-26 | End: 2025-02-26 | Stop reason: HOSPADM

## 2025-02-26 RX ORDER — BISACODYL 10 MG/1
10 SUPPOSITORY RECTAL DAILY PRN
Status: DISCONTINUED | OUTPATIENT
Start: 2025-02-26 | End: 2025-02-28 | Stop reason: HOSPADM

## 2025-02-26 RX ORDER — HEPARIN SODIUM 1000 [USP'U]/ML
INJECTION, SOLUTION INTRAVENOUS; SUBCUTANEOUS
Status: DISCONTINUED | OUTPATIENT
Start: 2025-02-26 | End: 2025-02-26 | Stop reason: HOSPADM

## 2025-02-26 RX ORDER — CEFAZOLIN SODIUM 1 G/3ML
INJECTION, POWDER, FOR SOLUTION INTRAMUSCULAR; INTRAVENOUS
Status: DISCONTINUED | OUTPATIENT
Start: 2025-02-26 | End: 2025-02-26 | Stop reason: HOSPADM

## 2025-02-26 RX ORDER — HYDRALAZINE HYDROCHLORIDE 10 MG/1
TABLET, FILM COATED ORAL
Status: DISCONTINUED | OUTPATIENT
Start: 2025-02-26 | End: 2025-02-26 | Stop reason: HOSPADM

## 2025-02-26 RX ORDER — HEPARIN SODIUM,PORCINE/D5W 25000/250
0-40 INTRAVENOUS SOLUTION INTRAVENOUS CONTINUOUS
Status: DISCONTINUED | OUTPATIENT
Start: 2025-02-26 | End: 2025-02-26

## 2025-02-26 RX ORDER — DOCUSATE SODIUM 100 MG/1
100 CAPSULE, LIQUID FILLED ORAL 2 TIMES DAILY PRN
Status: DISCONTINUED | OUTPATIENT
Start: 2025-02-26 | End: 2025-02-28 | Stop reason: HOSPADM

## 2025-02-26 RX ORDER — ACETAMINOPHEN 500 MG
1000 TABLET ORAL EVERY 6 HOURS PRN
Status: DISCONTINUED | OUTPATIENT
Start: 2025-02-26 | End: 2025-02-28 | Stop reason: HOSPADM

## 2025-02-26 RX ORDER — IOPAMIDOL 755 MG/ML
INJECTION, SOLUTION INTRAVASCULAR
Status: DISCONTINUED | OUTPATIENT
Start: 2025-02-26 | End: 2025-02-26 | Stop reason: HOSPADM

## 2025-02-26 RX ORDER — LOSARTAN POTASSIUM 50 MG/1
50 TABLET ORAL DAILY
Status: DISCONTINUED | OUTPATIENT
Start: 2025-02-26 | End: 2025-02-28 | Stop reason: HOSPADM

## 2025-02-26 RX ORDER — HYDRALAZINE HYDROCHLORIDE 20 MG/ML
10 INJECTION INTRAMUSCULAR; INTRAVENOUS EVERY 4 HOURS PRN
Status: DISCONTINUED | OUTPATIENT
Start: 2025-02-26 | End: 2025-02-28 | Stop reason: HOSPADM

## 2025-02-26 RX ORDER — GLUCAGON 1 MG
1 KIT INJECTION
Status: DISCONTINUED | OUTPATIENT
Start: 2025-02-26 | End: 2025-02-28 | Stop reason: HOSPADM

## 2025-02-26 RX ORDER — NITROGLYCERIN 0.4 MG/1
0.4 TABLET SUBLINGUAL EVERY 5 MIN PRN
Status: DISCONTINUED | OUTPATIENT
Start: 2025-02-26 | End: 2025-02-26 | Stop reason: HOSPADM

## 2025-02-26 RX ORDER — LIDOCAINE HYDROCHLORIDE 10 MG/ML
INJECTION, SOLUTION INFILTRATION; PERINEURAL
Status: DISCONTINUED | OUTPATIENT
Start: 2025-02-26 | End: 2025-02-26 | Stop reason: HOSPADM

## 2025-02-26 RX ORDER — IBUPROFEN 200 MG
24 TABLET ORAL
Status: DISCONTINUED | OUTPATIENT
Start: 2025-02-26 | End: 2025-02-28 | Stop reason: HOSPADM

## 2025-02-26 RX ORDER — POLYETHYLENE GLYCOL 3350 17 G/17G
17 POWDER, FOR SOLUTION ORAL 2 TIMES DAILY PRN
Status: DISCONTINUED | OUTPATIENT
Start: 2025-02-26 | End: 2025-02-28 | Stop reason: HOSPADM

## 2025-02-26 RX ORDER — ASPIRIN 81 MG/1
81 TABLET ORAL DAILY
Status: DISCONTINUED | OUTPATIENT
Start: 2025-02-26 | End: 2025-02-28 | Stop reason: HOSPADM

## 2025-02-26 RX ADMIN — ENOXAPARIN SODIUM 120 MG: 120 INJECTION SUBCUTANEOUS at 12:02

## 2025-02-26 RX ADMIN — METOPROLOL TARTRATE 25 MG: 25 TABLET, FILM COATED ORAL at 12:02

## 2025-02-26 RX ADMIN — NITROGLYCERIN 1 INCH: 20 OINTMENT TOPICAL at 12:02

## 2025-02-26 RX ADMIN — SODIUM CHLORIDE: 9 INJECTION, SOLUTION INTRAVENOUS at 03:02

## 2025-02-26 RX ADMIN — ASPIRIN 81 MG 324 MG: 81 TABLET ORAL at 12:02

## 2025-02-26 RX ADMIN — ACETAMINOPHEN 1000 MG: 500 TABLET ORAL at 06:02

## 2025-02-26 RX ADMIN — LOSARTAN POTASSIUM 50 MG: 50 TABLET, FILM COATED ORAL at 11:02

## 2025-02-26 RX ADMIN — VALSARTAN 160 MG: 160 TABLET, FILM COATED ORAL at 12:02

## 2025-02-26 RX ADMIN — ACETAMINOPHEN 1000 MG: 500 TABLET ORAL at 07:02

## 2025-02-26 RX ADMIN — ASPIRIN 81 MG: 81 TABLET, COATED ORAL at 11:02

## 2025-02-26 NOTE — CONSULTS
[facility]  Cardiology  Consult Note    Patient Name: Vesta Lala  MRN: [id number]  Admission Date: 2/25/2025  Hospital Length of Stay: 0 days  Code Status: Prior   Attending Provider: Cristian Rush MD   Consulting Provider: Charanjit Lopez NP  Primary Care Physician: Stephanie Sotelo MD  Principal Problem:<principal problem not specified>    Patient information was obtained from patient, past medical records, ER records, and primary team.     Consults  Subjective:     Chief Complaint:   Telecardiology Consult   [facility] ER  Dr Rush  > trop  > 30 minutes including revire of past and current medical records provider and patient interview    HPI:   79-year-old female who is followed by Dr. Enroll the.  She was last seen in clinic February the 17th 2025.  She has a history of nonobstructive CAD by catheterization in 2012 showing a mid LAD of 30%, paroxysmal atrial fibrillation post gastric sleeve 2014, hypertension, hyper lipidemia, hypothyroidism and obesity.  Echocardiogram performed August 2024 showed an EF of 60% and trivial valvular disease.  PET scan December 2023 showed no evidence of ischemia.  During her last office visit she had been complaining of some worsening dip to be upon exertional.  And she is actually scheduled for an outpatient Dasia PET.    She presents to the emergency room with worsening dyspnea on exertion as well as periodic chest tightness and especially at night.  She had chest pain around noon today that lasted about 3 hours stick sublingual nitroglycerin for relief of pain.  She decided to come to the ER for evaluation.  EKG done upon arrival shows a normal sinus rhythm no ST-T wave abnormalities.  Chest x-ray did show some congestion, CTA was negative for pulmonary embolus.  3531 initial troponin was 0.35 repeat 2 hours later was 0.31.    Past Medical History:   Diagnosis Date    High cholesterol     Hypertension     Internal hemorrhoids 07/2023     Paroxysmal atrial fibrillation     RSV (acute bronchiolitis due to respiratory syncytial virus) 11/2023       Past Surgical History:   Procedure Laterality Date    CARPAL TUNNEL RELEASE      CHOLECYSTECTOMY      GASTRIC BYPASS      hysterectomy  1990    JOINT REPLACEMENT      TONSILLECTOMY         Review of patient's allergies indicates:   Allergen Reactions    Adhesive      Other reaction(s): rash    Adhesive tape-silicones      Other reaction(s): rash    Dexlansoprazole      HA and did not work    Hydrocodone-acetaminophen      Other reaction(s): anxious, itching    Lovastatin     Meperidine      Other reaction(s): N/v    Simvastatin        No current facility-administered medications on file prior to encounter.     Current Outpatient Medications on File Prior to Encounter   Medication Sig    albuterol (PROVENTIL HFA) 90 mcg/actuation inhaler Inhale 2 puffs into the lungs every 6 (six) hours as needed for Wheezing.    docusate sodium (COLACE) 100 MG capsule Take 100 mg by mouth 2 (two) times daily as needed for Constipation.    ergocalciferol (ERGOCALCIFEROL) 50,000 unit Cap Take 50,000 Units by mouth every 7 days.    fluticasone propionate (FLONASE) 50 mcg/actuation nasal spray USE 2 SPRAY(S) IN EACH NOSTRIL ONCE DAILY FOR 10 DAYS    furosemide (LASIX) 40 MG tablet TAKE 1 TABLET EVERY DAY    gabapentin (NEURONTIN) 100 MG capsule TAKE ONE CAPSULE BY MOUTH EVERY MORNING AND TAKE TWO CAPSULES BY MOUTH EVERY DAY AT BEDTIME    levothyroxine (SYNTHROID) 88 MCG tablet TAKE 1 TABLET EVERY DAY    metoprolol tartrate (LOPRESSOR) 50 MG tablet Take 50 mg by mouth Daily.    multivitamin with minerals tablet Take 1 tablet by mouth once daily.    nitroGLYCERIN (NITROSTAT) 0.4 MG SL tablet Place 1 tablet (0.4 mg total) under the tongue every 5 (five) minutes as needed for Chest pain (if no relief report to ER).    olmesartan (BENICAR) 40 MG tablet Take 40 mg by mouth.    potassium chloride SA (K-DUR,KLOR-CON) 20 MEQ tablet TAKE  1 TABLET ONE TIME DAILY    rosuvastatin (CRESTOR) 20 MG tablet TAKE 1 TABLET EVERY DAY    traMADoL (ULTRAM) 50 mg tablet Take 1 tablet (50 mg total) by mouth every 12 (twelve) hours as needed for Pain.    XARELTO 20 mg Tab Take 20 mg by mouth Daily.     Family History    None       Tobacco Use    Smoking status: Never    Smokeless tobacco: Never   Substance and Sexual Activity    Alcohol use: Never    Drug use: Not on file    Sexual activity: Not Currently     Birth control/protection: None     Review of Systems   Constitutional: Positive for malaise/fatigue.   HENT: Negative.     Eyes: Negative.    Cardiovascular:  Positive for chest pain, dyspnea on exertion and orthopnea.   Respiratory:  Positive for shortness of breath.    Endocrine: Negative.    Musculoskeletal: Negative.    Gastrointestinal: Negative.    Genitourinary: Negative.    Neurological: Negative.    Psychiatric/Behavioral: Negative.     Allergic/Immunologic: Negative.      Objective:     Vital Signs (Most Recent):  Temp: 98.2 °F (36.8 °C) (02/25/25 2042)  Pulse: 64 (02/25/25 2305)  Resp: 17 (02/25/25 2305)  BP: (!) 165/87 (02/25/25 2305)  SpO2: 97 % (02/25/25 2305) Vital Signs (24h Range):  Temp:  [98.2 °F (36.8 °C)] 98.2 °F (36.8 °C)  Pulse:  [64-72] 64  Resp:  [17-20] 17  SpO2:  [93 %-98 %] 97 %  BP: (108-165)/(58-87) 165/87     Weight: 117.9 kg (260 lb)  Body mass index is 44.63 kg/m².    SpO2: 97 %       No intake or output data in the 24 hours ending 02/25/25 2343    Lines/Drains/Airways       Peripheral Intravenous Line  Duration                  Peripheral IV - Single Lumen 02/25/25 2105 20 G Anterior;Left Upper Arm <1 day                    Physical Exam  Constitutional:       Appearance: Normal appearance. She is obese.   HENT:      Head: Normocephalic.      Nose: Nose normal.      Mouth/Throat:      Mouth: Mucous membranes are moist.   Eyes:      Extraocular Movements: Extraocular movements intact.   Cardiovascular:      Rate and Rhythm:  Normal rate and regular rhythm.   Pulmonary:      Effort: Pulmonary effort is normal.      Breath sounds: Normal breath sounds.   Abdominal:      General: Abdomen is flat. Bowel sounds are normal.   Musculoskeletal:         General: Normal range of motion.      Cervical back: Normal range of motion and neck supple.   Skin:     General: Skin is warm and dry.   Neurological:      General: No focal deficit present.      Mental Status: She is alert. Mental status is at baseline. She is disoriented.   Psychiatric:         Mood and Affect: Mood normal.         Behavior: Behavior normal.         Significant Labs:   Recent Lab Results         02/25/25  2255   02/25/25  2110        Albumin/Globulin Ratio   1.1       Albumin   3.4       ALP   79       ALT   12       Anion Gap   10.0       AST   23       Baso #   0.05       Basophil %   0.6       BILIRUBIN TOTAL   0.3       BNP   371.9       BUN   19.2       BUN/CREAT RATIO   25       Calcium   9.3       Chloride   111       CO2   25       Creatinine   0.78       eGFR   >60  Comment: Estimated GFR calculated using the CKD-EPI creatinine (2021) equation.       Eos #   0.11       Eos %   1.3       Globulin, Total   3.1       Glucose   142       Hematocrit   38.8       Hemoglobin   12.1       Immature Grans (Abs)   0.00       Immature Granulocytes   0.0       Lymph #   2.74       LYMPH %   33.6       MCH   28.5       MCHC   31.2       MCV   91.3       Mono #   0.47       Mono %   5.8       MPV   10.9       Neut #   4.78       Neut %   58.7       Platelet Count   230       Potassium   4.1       PROTEIN TOTAL   6.5       RBC   4.25       RDW   14.1       Sodium   146       Troponin I 0.318   0.356       WBC   8.15               Significant Imaging:   EKG:  Normal sinus rhythm no acute changes   Chest x-ray some vascular congestion CTA no pulmonary embolus  Assessment and Plan:     Impression:   Dyspnea upon exertion concerning for anginal equivalent  Chest discomfort   Mildly  elevated troponin  Paroxysmal atrial fibrillation   -currently on Xarelto  Hypertension-  -Lasix 40 daily     - Metoprolol 50 daily   - Benicar 40 daily  Dyslipidemia  - Crestor 40 daily    Plan:  Symptoms she was suffering with a day of concerning for unstable angina  She is actually scheduled for a stress test on March 14th however I feel she will need to be transferred to a facility where Interventional Cardiology is available and she will probably the to go ahead with a left heart catheterization plus or minus PCI tomorrow morning.  Hold Xarelto for now   Aspirin 81 mg daily   Benicar 40 daily   Rosuvastatin 20 daily   Metoprolol ER 50 mg daily   Nitropaste 1 in to chest wall   Morphine 2 mg q.2 hours p.r.n.   Keep NPO after midnight except for medications     She can be admitted to our service   Thank you for the consultation should you have any further questions or concerns please do not hesitate to call        There are no hospital problems to display for this patient.      VTE Risk Mitigation (From admission, onward)      None            Thank you for your consult.     Charanjit Lopez NP  Cardiology   [facility]

## 2025-02-26 NOTE — H&P
[facility] Medicine History & Physical Examination       Patient Name: Vesta Lala  MRN: [id number]  Patient Class: IP- Inpatient   Admission Date: 2/26/2025   Admitting Physician: CLEMENTE Service   Length of Stay: 0  Attending Physician: Malcolm Gilbert MD  Primary Care Provider: Stephanie Sotelo MD  Face-to-Face encounter date: 02/26/2025  Code Status: Full  Chief Complaint: No chief complaint on file.      Screening for Social Drivers for health:  Patient screened for food insecurity, housing instability, transportation needs, utility difficulties, and interpersonal safety (select all that apply as identified as concern)  []Housing or Food  []Transportation Needs  []Utility Difficulties  []Interpersonal safety  [x]None      Patient information was obtained from patient, patient's family, past medical records and ER records.  ED records were reviewed in detail and documented below    HISTORY OF PRESENT ILLNESS:   Vesta Lala is a 79 y.o. female who  has a past medical history of High cholesterol, Hypertension, Internal hemorrhoids (07/2023), Paroxysmal atrial fibrillation, and RSV (acute bronchiolitis due to respiratory syncytial virus) (11/2023).. The patient presented to [facility] on 2/26/2025 with a primary complaint of chest discomfort that started on 2/25/25. Symptoms started when she was shopping and walking. Lasted for few hrs, till she was seen in the ED and treated with nitro. She did have some nausea and dizziness. Denies any heaviness of left arm, vomiting, fever or chills. She does have chronic SOB and occ dry cough.      Vitals showed mild elevated BP. O2 sats stable. CBC, CMP unremarkable.    Latest Reference Range & Units 02/25/25 21:10 02/25/25 22:55 02/26/25 04:51   BNP <=100.0 pg/mL 371.9 (H)     Troponin I 0.000 - 0.045 ng/mL 0.356 (H) 0.318 (H) 0.308 (H)         Patient now has mild dizziness when she gets up. Ptherwise no other acute issues. Seen by  [facility] and will be taken for [facility]. Will start on ASA. IV heparin per [facility] team.     PAST MEDICAL HISTORY:     Past Medical History:   Diagnosis Date    High cholesterol     Hypertension     Internal hemorrhoids 07/2023    Paroxysmal atrial fibrillation     RSV (acute bronchiolitis due to respiratory syncytial virus) 11/2023       PAST SURGICAL HISTORY:     Past Surgical History:   Procedure Laterality Date    CARPAL TUNNEL RELEASE      CHOLECYSTECTOMY      GASTRIC BYPASS      hysterectomy  1990    JOINT REPLACEMENT      TONSILLECTOMY         ALLERGIES:   Adhesive, Adhesive tape-silicones, Dexlansoprazole, Hydrocodone-acetaminophen, Lovastatin, Meperidine, and Simvastatin    FAMILY HISTORY:   Reviewed and negative    SOCIAL HISTORY:     Social History     Tobacco Use    Smoking status: Never    Smokeless tobacco: Never   Substance Use Topics    Alcohol use: Never        HOME MEDICATIONS:     Prior to Admission medications    Medication Sig Start Date End Date Taking? Authorizing Provider   albuterol (PROVENTIL HFA) 90 mcg/actuation inhaler Inhale 2 puffs into the lungs every 6 (six) hours as needed for Wheezing. 10/1/24  Yes Stephanie Sotelo MD   docusate sodium (COLACE) 100 MG capsule Take 100 mg by mouth 2 (two) times daily as needed for Constipation.   Yes Provider, Historical   ergocalciferol (ERGOCALCIFEROL) 50,000 unit Cap Take 50,000 Units by mouth every 7 days.   Yes Provider, Historical   furosemide (LASIX) 40 MG tablet TAKE 1 TABLET EVERY DAY 7/1/24  Yes Stephanie Sotelo MD   gabapentin (NEURONTIN) 100 MG capsule TAKE ONE CAPSULE BY MOUTH EVERY MORNING AND TAKE TWO CAPSULES BY MOUTH EVERY DAY AT BEDTIME 10/1/24  Yes Stephanie Sotelo MD   levothyroxine (SYNTHROID) 88 MCG tablet TAKE 1 TABLET EVERY DAY 2/14/25  Yes Stephanie Sotelo MD   metoprolol tartrate (LOPRESSOR) 50 MG tablet Take 50 mg by mouth Daily. 8/19/24  Yes Provider, Historical   multivitamin with minerals tablet Take 1 tablet by mouth  once daily.   Yes Provider, Historical   nitroGLYCERIN (NITROSTAT) 0.4 MG SL tablet Place 1 tablet (0.4 mg total) under the tongue every 5 (five) minutes as needed for Chest pain (if no relief report to ER). 12/3/23  Yes Lynn Wellington MD   olmesartan (BENICAR) 40 MG tablet Take 40 mg by mouth. 8/30/24  Yes Provider, Historical   potassium chloride SA (K-DUR,KLOR-CON) 20 MEQ tablet TAKE 1 TABLET ONE TIME DAILY 9/23/24  Yes Stephanie Sotelo MD   rosuvastatin (CRESTOR) 20 MG tablet TAKE 1 TABLET EVERY DAY 2/14/25  Yes Stephanie Sotelo MD   traMADoL (ULTRAM) 50 mg tablet Take 1 tablet (50 mg total) by mouth every 12 (twelve) hours as needed for Pain. 2/24/25  Yes Stephanie Sotelo MD   XARELTO 20 mg Tab Take 20 mg by mouth Daily. 9/19/23  Yes Provider, Historical   fluticasone propionate (FLONASE) 50 mcg/actuation nasal spray USE 2 SPRAY(S) IN EACH NOSTRIL ONCE DAILY FOR 10 DAYS 12/18/22   Provider, Historical       REVIEW OF SYSTEMS:   Except as documented, all other systems reviewed and negative     PHYSICAL EXAM:     VITAL SIGNS: 24 HRS MIN & MAX LAST   Temp  Min: 97.4 °F (36.3 °C)  Max: 98.2 °F (36.8 °C) 97.4 °F (36.3 °C)   BP  Min: 108/58  Max: 167/82 (!) 167/82   Pulse  Min: 64  Max: 73  73   Resp  Min: 17  Max: 20 19   SpO2  Min: 93 %  Max: 98 % 95 %     General appearance: Well-developed, well-nourished female in no apparent distress. + obesity   HENT: Atraumatic head. Moist mucous membranes of oral cavity.  Eyes: Normal extraocular movements.   Neck: Supple.   Lungs: Clear to auscultation bilaterally. No wheezing present.   Heart: Regular rate and rhythm. S1 and S2 present with no murmurs/gallop/rub. No pedal edema. No JVD present.   Abdomen: Soft, non-distended, non-tender. No rebound tenderness/guarding. Bowel sounds are normal.   Extremities: No cyanosis, clubbing, or edema.  Skin: No Rash.   Neuro: Motor and sensory exams grossly intact. Good tone. Muscle strength 5/5 in all 4  extremities  Psych/mental status: Appropriate mood and affect. Responds appropriately to questions.     LABS AND IMAGING:     Recent Labs   Lab 02/25/25 2110   WBC 8.15   RBC 4.25   HGB 12.1   HCT 38.8   MCV 91.3   MCH 28.5   MCHC 31.2*   RDW 14.1      MPV 10.9*       Recent Labs   Lab 02/25/25 2110   *   K 4.1   *   CO2 25   BUN 19.2   CREATININE 0.78   CALCIUM 9.3   ALBUMIN 3.4   ALKPHOS 79   ALT 12   AST 23   BILITOT 0.3       Microbiology Results (last 7 days)       ** No results found for the last 168 hours. **             CTA Chest Non-Coronary (PE Studies)  Narrative: Technique: CT Scan of the chest was performed with intravenous contrast with direct axial images as well as sagittal and coronal reconstruction images pulmonary embolus protocol.    Dosage Information: Automated Exposure Control was utilized.   mGy cm.    Comparison: None.    Clinical History: SOB.    Findings:    Aorta: No aortic dissection or aneurysm is seen. Mild aortic calcification is seen in the arch and descending thoracic aorta.    Pulmonary Arteries: No filling defects are seen in the pulmonary arteries to suggest pulmonary embolus.    Lungs: There is mild non specific dependent change at the lung bases. No acute focal infiltrate or consolidation is seen. There is a 6 mm solid nodule in the posterior basal segment of the right lower lobe abutting the diaphragm, centered on coronal series 604 image 76.    Pleura: No effusions or pneumothorax are identified.    Bony Structures:    Spine: Moderate spondylolytic changes are seen in the thoracic spine.  L1 kyphoplasty.  Demineralization of the bones.  Multiple small Schmorl node defects.    Abdomen: A medium sized hiatal hernia with post operative change is noted. The rest of the visualized upper abdominal organs appear unremarkable.  Impression: Impression:    1. No filling defects are seen in the pulmonary arteries to suggest pulmonary embolus.    2. No acute  focal infiltrate or consolidation is seen. There is a 6 mm solid nodule in the posterior basal segment of the right lower lobe abutting the diaphragm, centered on coronal series 604 image 76. Correlate clinically as regards additional evaluation and follow up.    3. No acute intrathoracic process identified. Details and other findings as discussed above.    No significant discrepancy with overnight report.    Electronically signed by: Rohan Shah  Date:    02/26/2025  Time:    08:23      ASSESSMENT & PLAN:   NSTEMI type undermined   PAF  Obesity   HTN, benign       Plan:  Patient looks comfortable  Gets dizzy only when she gets up   Started on ASA daily   IV heparin per [facility] team     Now NPO for [facility]     Home meds reviewed and started. Start after [facility]     Will check resp panel, MRSA PCR, RSV, COVID and flu today     HbA1c today     Labs in am     Critical care note:  Critical care diagnosis: NSTEMI/unstable angina needing urgent [facility]   Critical care interventions: Hands-on evaluation, review of labs/radiographs/records and discussion with patient and family if present  Critical care time spent: 35 minutes         VTE Prophylaxis:  IV heparin per protocol     Patient condition:  Guarded    __________________________________________________________________________  INPATIENT LIST OF MEDICATIONS     Scheduled Meds:   aspirin  81 mg Oral Daily     Continuous Infusions:  PRN Meds:.  Current Facility-Administered Medications:     acetaminophen, 1,000 mg, Oral, Q6H PRN    aluminum-magnesium hydroxide-simethicone, 30 mL, Oral, QID PRN    bisacodyL, 10 mg, Rectal, Daily PRN    dextrose 50%, 12.5 g, Intravenous, PRN    dextrose 50%, 25 g, Intravenous, PRN    glucagon (human recombinant), 1 mg, Intramuscular, PRN    glucose, 16 g, Oral, PRN    glucose, 24 g, Oral, PRN    melatonin, 6 mg, Oral, Nightly PRN    polyethylene glycol, 17 g, Oral, BID PRN    sodium chloride 0.9%, 10 mL, Intravenous,  PRN          ________________________________________________________________________________      If patient was admitted under observational status it is with my approval/permission.        All diagnosis and differential diagnosis have been reviewed; assessment and plan has been documented; I have personally reviewed the labs and test results that are presently available; I have reviewed the patients medication list; I have reviewed the consulting providers response and recommendations. I have reviewed or attempted to review medical records based upon their availability.    All of the patient and family questions have been addressed and answered. Patient's is agreeable to the above stated plan. I will continue to monitor closely and make adjustments to medical management as needed.    If patient was admitted under observational status it is with my approval/permission.      Malcolm Gilbert MD   02/26/2025

## 2025-02-26 NOTE — ED PROVIDER NOTES
Encounter Date: 2/25/2025       History     Chief Complaint   Patient presents with    Shortness of Breath     Shortness of breath for a few weeks, today had some jaw pain, took 1 ntg with relief     79-year-old female presents complaints of shortness of breath for few weeks and notes worsening IVY as well as periodic chest tightness especially at night with PND; today had episode of chest tightness with jaw pain, no nausea or vomiting; the chest pain occurred around noon and lasted 3 hours until she could get home and take a nitroglycerin which relieved the pain; she reports having a nuclear stress test scheduled in 1 month; she has a past medical history of paroxysmal atrial fibrillation, hypertension, hypercholesterolemia, T2 DM, CLAY, GERD, chronic back pain, CAD, hypothyroidism    The history is provided by the patient and a relative (Daughter).     Review of patient's allergies indicates:   Allergen Reactions    Adhesive      Other reaction(s): rash    Adhesive tape-silicones      Other reaction(s): rash    Dexlansoprazole      HA and did not work    Hydrocodone-acetaminophen      Other reaction(s): anxious, itching    Lovastatin     Meperidine      Other reaction(s): N/v    Simvastatin      Past Medical History:   Diagnosis Date    High cholesterol     Hypertension     Internal hemorrhoids 07/2023    Paroxysmal atrial fibrillation     RSV (acute bronchiolitis due to respiratory syncytial virus) 11/2023     Past Surgical History:   Procedure Laterality Date    CARPAL TUNNEL RELEASE      CHOLECYSTECTOMY      GASTRIC BYPASS      hysterectomy  1990    JOINT REPLACEMENT      TONSILLECTOMY       No family history on file.  Social History[1]  Review of Systems   Constitutional: Negative.    HENT: Negative.     Respiratory:  Positive for shortness of breath.    Cardiovascular:  Positive for chest pain.   Gastrointestinal:         GERD   Musculoskeletal:  Positive for back pain.   Skin: Negative.    Neurological:  Negative.    All other systems reviewed and are negative.      Physical Exam     Initial Vitals [02/25/25 2042]   BP Pulse Resp Temp SpO2   (!) 108/58 72 18 98.2 °F (36.8 °C) 98 %      MAP       --         Physical Exam    Nursing note and vitals reviewed.  Constitutional: She appears well-developed and well-nourished.   HENT:   Head: Normocephalic and atraumatic. Mouth/Throat: Oropharynx is clear and moist.   Eyes: EOM are normal. Pupils are equal, round, and reactive to light.   Neck: Neck supple. No JVD present.   Normal range of motion.  Cardiovascular:  Normal rate, normal heart sounds and intact distal pulses. An irregular rhythm present.           Pulmonary/Chest: Breath sounds normal. She has no wheezes.   Diminished breath sounds in the bases   Abdominal: Abdomen is soft. Bowel sounds are normal. There is no abdominal tenderness. There is no rebound.   Musculoskeletal:         General: Normal range of motion.      Cervical back: Normal range of motion and neck supple.      Comments: Bilateral lower extremity edema     Neurological: She is alert and oriented to person, place, and time. She has normal strength. GCS score is 15. GCS eye subscore is 4. GCS verbal subscore is 5. GCS motor subscore is 6.   Skin: Skin is warm and dry.         ED Course   Critical Care    Date/Time: 2/25/2025 9:00 PM    Performed by: Cristian Rush MD  Authorized by: Cristian Rush MD  Direct patient critical care time: 35 minutes  Additional history critical care time: 3 minutes  Ordering / reviewing critical care time: 3 minutes  Documentation critical care time: 4 minutes  Consulting other physicians critical care time: 3 minutes  Consult with family critical care time: 2 minutes  Total critical care time (exclusive of procedural time) : 50 minutes  Critical care was necessary to treat or prevent imminent or life-threatening deterioration of the following conditions: cardiac failure (NSTEMI).  Critical care was time spent  personally by me on the following activities: discussions with consultants, development of treatment plan with patient or surrogate, evaluation of patient's response to treatment, examination of patient, obtaining history from patient or surrogate, ordering and performing treatments and interventions, ordering and review of laboratory studies, ordering and review of radiographic studies, pulse oximetry, re-evaluation of patient's condition and review of old charts.        Labs Reviewed   B-TYPE NATRIURETIC PEPTIDE - Abnormal       Result Value    Natriuretic Peptide 371.9 (*)    COMPREHENSIVE METABOLIC PANEL - Abnormal    Sodium 146 (*)     Potassium 4.1      Chloride 111 (*)     CO2 25      Glucose 142 (*)     Blood Urea Nitrogen 19.2      Creatinine 0.78      Calcium 9.3      Protein Total 6.5      Albumin 3.4      Globulin 3.1      Albumin/Globulin Ratio 1.1      Bilirubin Total 0.3      ALP 79      ALT 12      AST 23      eGFR >60      Anion Gap 10.0      BUN/Creatinine Ratio 25     TROPONIN I - Abnormal    Troponin-I 0.356 (*)    CBC WITH DIFFERENTIAL - Abnormal    WBC 8.15      RBC 4.25      Hgb 12.1      Hct 38.8      MCV 91.3      MCH 28.5      MCHC 31.2 (*)     RDW 14.1      Platelet 230      MPV 10.9 (*)     Neut % 58.7      Lymph % 33.6      Mono % 5.8      Eos % 1.3      Basophil % 0.6      Imm Grans % 0.0      Neut # 4.78      Lymph # 2.74      Mono # 0.47      Eos # 0.11      Baso # 0.05      Imm Gran # 0.00     TROPONIN I - Abnormal    Troponin-I 0.318 (*)    CBC W/ AUTO DIFFERENTIAL    Narrative:     The following orders were created for panel order CBC auto differential.  Procedure                               Abnormality         Status                     ---------                               -----------         ------                     CBC with Differential[[id number]]       Abnormal            Final result                 Please view results for these tests on the individual orders.     EKG  Readings: (Independently Interpreted)   Rhythm: Normal Sinus Rhythm. Ectopy: PACs. Conduction: Normal. Clinical Impression: Normal Sinus Rhythm   Nonspecific ST and T-wave abnormality; prolonged QT       Imaging Results              CTA Chest Non-Coronary (PE Studies) (Preliminary result)  Result time 02/25/25 22:38:33      Preliminary result by Johnny Romero Jr., MD (02/25/25 22:38:33)                   Narrative:    START OF REPORT:  Technique: CT Scan of the chest was performed with intravenous contrast with direct axial images as well as sagittal and coronal reconstruction images pulmonary embolus protocol.    Dosage Information: Automated Exposure Control was utilized.    Comparison: None.    Clinical History: SOB.    Findings:  Neck: The visualized soft tissues of the neck appear unremarkable. The thyroid gland appears unremarkable.  Mediastinum: The mediastinal structures are within normal limits.  Heart: The heart size is within normal limits. Minimal coronary artery calcification is seen.  Aorta: No aortic dissection or aneurysm is seen. Mild aortic calcification is seen in the arch and descending thoracic aorta.  Pulmonary Arteries: No filling defects are seen in the pulmonary arteries to suggest pulmonary embolus.  Lungs: There is mild non specific dependent change at the lung bases. No acute focal infiltrate or consolidation is seen. There is a 6 mm solid nodule in the posterior basal segment of the right lower lobe abutting the diaphragm, centered on coronal series 604 image 76.  Pleura: No effusions or pneumothorax are identified.  Bony Structures:  Spine: Moderate spondylolytic changes are seen in the thoracic spine.  Ribs: The bilateral ribs appear unremarkable.  Abdomen: A medium sized hiatal hernia with post operative change is noted. The rest of the visualized upper abdominal organs appear unremarkable.      Impression:  1. No filling defects are seen in the pulmonary arteries to suggest  pulmonary embolus.  2. No acute focal infiltrate or consolidation is seen. There is a 6 mm solid nodule in the posterior basal segment of the right lower lobe abutting the diaphragm, centered on coronal series 604 image 76. Correlate clinically as regards additional evaluation and follow up.  3. No acute intrathoracic process identified. Details and other findings as discussed above.                                         X-Ray Chest AP Portable (Final result)  Result time 02/25/25 22:24:25      Final result by Rohan Shah MD (02/25/25 22:24:25)                   Impression:      No acute cardiopulmonary process identified.      Electronically signed by: Rohan Shah  Date:    02/25/2025  Time:    22:24               Narrative:    EXAMINATION:  XR CHEST AP PORTABLE    CLINICAL HISTORY:  sob;    TECHNIQUE:  One view    COMPARISON:  November 25, 2020.    FINDINGS:  Cardiopericardial silhouette enlarged appearance is similar.  Lungs hypoventilatory changes without dense focal or segmental consolidation, congestive process, pleural effusions or pneumothorax.                        Wet Read by Cristian Rush MD (02/25/25 21:43:14, [facility] Dept, Emergency Medicine)    Right upper opacification, poor inspiratory effort                                     Medications   metoprolol tartrate (LOPRESSOR) tablet 25 mg (25 mg Oral Given 2/26/25 0016)   valsartan tablet 160 mg (160 mg Oral Given 2/26/25 0038)   ALPRAZolam tablet 0.25 mg (has no administration in time range)   zolpidem tablet 5 mg (has no administration in time range)   nitroGLYCERIN 2% TD oint ointment 1 inch (1 inch Topical (Top) Given 2/26/25 0017)   nitroGLYCERIN SL tablet 0.4 mg (has no administration in time range)   albuterol-ipratropium 2.5 mg-0.5 mg/3 mL nebulizer solution 3 mL (3 mLs Nebulization Given 2/25/25 2117)   iohexoL (OMNIPAQUE 350) injection 100 mL (100 mLs Intravenous Given 2/25/25 2203)   aspirin chewable tablet  324 mg (324 mg Oral Given 2/26/25 0016)   enoxaparin injection 120 mg (120 mg Subcutaneous Given 2/26/25 0017)     Medical Decision Making  79-year-old female presents complaints of shortness of breath for few weeks and notes worsening IVY as well as periodic chest tightness especially at night with PND; today had episode of chest tightness with jaw pain, no nausea or vomiting; she reports having a nuclear stress test scheduled in 1 month; she has a past medical history of paroxysmal atrial fibrillation, hypertension, hypercholesterolemia, T2 DM, CLAY, GERD, chronic back pain, CAD, hypothyroidism    DIFFERENTIAL DIAGNOSIS- NSTEMI, ACS, CHF, COPD, GERD    Amount and/or Complexity of Data Reviewed  External Data Reviewed: labs, radiology, ECG and notes.  Labs: ordered. Decision-making details documented in ED Course.  Radiology: ordered and independent interpretation performed. Decision-making details documented in ED Course.  ECG/medicine tests: ordered and independent interpretation performed. Decision-making details documented in ED Course.  Discussion of management or test interpretation with external provider(s):     Discussed case with [facility] on-call Charanjit Lopez NP; I reviewed HPI, past medical history, physical exam, lab findings, treatment and response to treatment; recommends transfer for further cardiology evaluation    Accepted at [facility], Dr. Reyes hospitalist; spoke with nurse practitioner and discussed HPI, past medical history, physical exam, lab findings, treatment and response to treatment    Risk  OTC drugs.  Prescription drug management.  Decision regarding hospitalization.               ED Course as of 02/26/25 0142 Tue Feb 25, 2025 2207 Troponin I(!): 0.356 [DD]   2326 Troponin I(!): 0.318 [DD]      ED Course User Index  [DD] Cristian Rush MD          Patient Vitals for the past 24 hrs:   BP Temp Pulse Resp SpO2 Height Weight   02/26/25 0053 (!) 158/80 -- 70 -- 95  "% -- --   02/26/25 0038 (!) 165/87 -- -- -- -- -- --   02/26/25 0016 (!) 165/87 -- 72 -- -- -- --   02/25/25 2305 (!) 165/87 -- 64 17 97 % -- --   02/25/25 2117 -- -- 70 20 (!) 93 % -- --   02/25/25 2042 (!) 108/58 98.2 °F (36.8 °C) 72 18 98 % 5' 4" (1.626 m) 117.9 kg (260 lb)                      Clinical Impression:  Final diagnoses:  [R06.02] Shortness of breath  [I21.4] NSTEMI (non-ST elevated myocardial infarction) (Primary)  [R03.0] Elevated blood pressure reading          ED Disposition Condition    Transfer to Another Facility Stable                    [1]   Social History  Tobacco Use    Smoking status: Never    Smokeless tobacco: Never   Substance Use Topics    Alcohol use: Never        Cristian Rush MD  02/26/25 0142    "

## 2025-02-26 NOTE — CONSULTS
Inpatient consult to Cardiology  Consult performed by: Karlo Mcclain ANP  Consult ordered by: Renetta Sorensen FNP  Reason for consult: NSTEMI        [facility]    Cardiology  Consult Note    Patient Name: Vesta Lala  MRN: [id number]  Admission Date: 2/26/2025  Hospital Length of Stay: 0 days  Code Status: Full Code   Attending Provider: Reyes, Thairy G, DO   Consulting Provider: RICKIE Henderson  Primary Care Physician: Stephanie Sotelo MD  Principal Problem:<principal problem not specified>    Patient information was obtained from patient, past medical records, and ER records.     Subjective:     Chief Complaint/Reason for Consult: NSTEMI     HPI: Ms. Lala is a 78 y/o female who is known to [facility], Dr. Jama. The patient presented to [facility] on 2.26.25 with c/o CP. The patient was recently evaluated in the clinic and had c/o IVY. At that time she was scheduled for an OP PET Scan which she has not undergone yet. She reported that she was in her normal state of health when she developed some Chest Tightness. She reported that the pain was located to her Anterior Chest Wall and was rated 8/10 on a verbal scale and was associated with SOB. It occurred with exertion and relieve with rest. She presented to the ER and was evaluated by Telecardiology for an Elevated Troponin with recommendations to Transfer the PT to a PCI Capable Facility. Initial workup revealed: H&H 12.1/38.8, , Na 146, K 4.1, Cl 111, BUN/Crea 19.2/0.78, Glucose 142, .9, Troponin 0.356. She was admitted to  and Cardiology was consulted for NSTEMI.    PMH: CAD, AF, HTN, HLD, Hypothyroidism, Spinal Stenosis, CLAY/CPAP, MO  PSH: LHC (10.30.12), Carpal Tunnel Release, Cholecystectomy, Gastric Sleeve, Hysterectomy, Joint Replacement, Tonsillectomy.  Family History: Non-Contributory  Social History: Denies Tobacco, ETOH, Illicit Drug Use     Previous Cardiac Diagnostics:   ECHO 11.27.23:  Left  Ventricle: The left ventricle is normal in size. Normal wall thickness. Normal wall motion. There is normal systolic function with a visually estimated ejection fraction of 60 - 65%. Ejection fraction by visual approximation is 65%.  Right Ventricle: Normal right ventricular cavity size. Systolic function is normal.  Pericardium: There is a small effusion. No indication of cardiac tamponade.  Technically difficult study due to poor acoustic window.    ECHO 4.19.21:  The study quality is below average.   The left ventricle is normal in size. Global left ventricular systolic function is normal. The left ventricular ejection fraction is 65%.   Mild to moderate (1-2+) aortic regurgitation. Mild (1+) mitral regurgitation. Mild (1+) tricuspid regurgitation.  The pulmonary artery systolic pressure is 29 mmHg.      Carotid US 4.19.21:  The study quality is average.   1-39% stenosis in the proximal right internal carotid artery based on Bluth Criteria.   1-39% stenosis in the proximal left internal carotid artery based on Bluth Criteria.   Antegrade right vertebral artery flow.   Antegrade left vertebral artery flow.      PET 11.3.17:  Normal perfusion study, no perfusion defect.    No evidence of ischemia     [facility] 10.30.2012:  Left main is normal giving rise to left anterior descending artery and left circumflex artery  Left anterior descending artery.  The patient does have diffuse 30-40% disease  in the mid to distal vessel, smaller caliber distal vessel; however, no obstructive lesion.  Diagonal was a small to moderate sized branch with a 40% mid vessel stenosis  Left circumflex artery gives rise to two large obtuse marginal branches with proximal 30% stenosis of second obtuse marginal.  Right coronary artery is a large right dominant vessel without evidence of disease.  Left ventriculogram revealed left ventricular function, ejection fraction 55%    Review of patient's allergies indicates:   Allergen Reactions     Adhesive      Other reaction(s): rash    Adhesive tape-silicones      Other reaction(s): rash    Dexlansoprazole      HA and did not work    Hydrocodone-acetaminophen      Other reaction(s): anxious, itching    Lovastatin     Meperidine      Other reaction(s): N/v    Simvastatin      Current Facility-Administered Medications on File Prior to Encounter   Medication    [COMPLETED] albuterol-ipratropium 2.5 mg-0.5 mg/3 mL nebulizer solution 3 mL    [COMPLETED] aspirin chewable tablet 324 mg    [COMPLETED] enoxaparin injection 120 mg    [COMPLETED] iohexoL (OMNIPAQUE 350) injection 100 mL    [DISCONTINUED] ALPRAZolam tablet 0.25 mg    [DISCONTINUED] metoprolol tartrate (LOPRESSOR) tablet 25 mg    [DISCONTINUED] nitroGLYCERIN 2% TD oint ointment 1 inch    [DISCONTINUED] nitroGLYCERIN SL tablet 0.4 mg    [DISCONTINUED] valsartan tablet 160 mg    [DISCONTINUED] zolpidem tablet 5 mg     Current Outpatient Medications on File Prior to Encounter   Medication Sig    albuterol (PROVENTIL HFA) 90 mcg/actuation inhaler Inhale 2 puffs into the lungs every 6 (six) hours as needed for Wheezing.    docusate sodium (COLACE) 100 MG capsule Take 100 mg by mouth 2 (two) times daily as needed for Constipation.    ergocalciferol (ERGOCALCIFEROL) 50,000 unit Cap Take 50,000 Units by mouth every 7 days.    furosemide (LASIX) 40 MG tablet TAKE 1 TABLET EVERY DAY    gabapentin (NEURONTIN) 100 MG capsule TAKE ONE CAPSULE BY MOUTH EVERY MORNING AND TAKE TWO CAPSULES BY MOUTH EVERY DAY AT BEDTIME    levothyroxine (SYNTHROID) 88 MCG tablet TAKE 1 TABLET EVERY DAY    metoprolol tartrate (LOPRESSOR) 50 MG tablet Take 50 mg by mouth Daily.    multivitamin with minerals tablet Take 1 tablet by mouth once daily.    nitroGLYCERIN (NITROSTAT) 0.4 MG SL tablet Place 1 tablet (0.4 mg total) under the tongue every 5 (five) minutes as needed for Chest pain (if no relief report to ER).    olmesartan (BENICAR) 40 MG tablet Take 40 mg by mouth.    potassium  "chloride SA (K-DUR,KLOR-CON) 20 MEQ tablet TAKE 1 TABLET ONE TIME DAILY    rosuvastatin (CRESTOR) 20 MG tablet TAKE 1 TABLET EVERY DAY    traMADoL (ULTRAM) 50 mg tablet Take 1 tablet (50 mg total) by mouth every 12 (twelve) hours as needed for Pain.    XARELTO 20 mg Tab Take 20 mg by mouth Daily.    fluticasone propionate (FLONASE) 50 mcg/actuation nasal spray USE 2 SPRAY(S) IN EACH NOSTRIL ONCE DAILY FOR 10 DAYS     Review of Systems   Constitutional:  Positive for fatigue.   Respiratory:  Positive for chest tightness and shortness of breath.    Cardiovascular:  Positive for chest pain. Negative for palpitations and leg swelling.   All other systems reviewed and are negative.    Objective:     Vital Signs (Most Recent):  Temp: 97.4 °F (36.3 °C) (02/26/25 0743)  Pulse: 71 (02/26/25 0324)  Resp: 18 (02/26/25 0324)  BP: (!) 145/77 (02/26/25 0324)  SpO2: 97 % (02/26/25 0324) Vital Signs (24h Range):  Temp:  [97.4 °F (36.3 °C)-98.2 °F (36.8 °C)] 97.4 °F (36.3 °C)  Pulse:  [64-72] 71  Resp:  [17-20] 18  SpO2:  [93 %-98 %] 97 %  BP: (108-165)/(58-87) 145/77   Weight: 117.9 kg (260 lb)  Body mass index is 44.63 kg/m².  SpO2: 97 %     No intake or output data in the 24 hours ending 02/26/25 0803  Lines/Drains/Airways       None                 Significant Labs:   Chemistries:   Recent Labs   Lab 02/25/25 2110 02/25/25  2255 02/26/25  0451   *  --   --    K 4.1  --   --    *  --   --    CO2 25  --   --    BUN 19.2  --   --    CREATININE 0.78  --   --    CALCIUM 9.3  --   --    BILITOT 0.3  --   --    ALKPHOS 79  --   --    ALT 12  --   --    AST 23  --   --    GLUCOSE 142*  --   --    TROPONINI 0.356* 0.318* 0.308*        CBC/Anemia Labs: Coags:    Recent Labs   Lab 02/25/25  2110   WBC 8.15   HGB 12.1   HCT 38.8      MCV 91.3   RDW 14.1    No results for input(s): "PT", "INR", "APTT" in the last 168 hours.     EKG:       Telemetry: SR with PACS    Physical Exam  Constitutional:       General: She is not " in acute distress.     Appearance: Normal appearance. She is obese.   HENT:      Head: Normocephalic.      Mouth/Throat:      Mouth: Mucous membranes are moist.   Eyes:      Extraocular Movements: Extraocular movements intact.   Cardiovascular:      Rate and Rhythm: Normal rate and regular rhythm.      Pulses: Normal pulses.   Pulmonary:      Effort: Pulmonary effort is normal. No respiratory distress.      Breath sounds: Normal breath sounds.   Abdominal:      Palpations: Abdomen is soft.   Musculoskeletal:         General: No swelling. Normal range of motion.   Skin:     General: Skin is warm and dry.   Neurological:      General: No focal deficit present.      Mental Status: She is alert and oriented to person, place, and time. Mental status is at baseline.   Psychiatric:         Mood and Affect: Mood normal.         Behavior: Behavior normal.         Judgment: Judgment normal.       Home Medications:   Medications Ordered Prior to Encounter[1]  Current Schedule Inpatient Medications:    Assessment:   NSTEMI    - Troponin Peak 0.356  Angina Equivalent - Chest Tightness/SOB/IVY  PAF - Now SR with PACS    - CHADsVASc - 5 Points - 7.2% Stroke Risk per Year   HTN  HLD  CLAY/CPAP  MO  Hypothyroidism  No Hx of GIB     Plan:   Start ASA 81mg PO Qday  Start Heparin Bolus and Drip per Protocol for NSTEMI   Trend Cardiac Biomarkers  ECHO Pending  Keep NPO  Schedule/Consent for LHC with Possible PTCA +/- Stenting Today (2.26.25)  Risk, Benefits and Alternatives Reviewed and Discussed with the PT and their Family and they wish to proceed with above Procedure.   Will Continue to Follow  Labs and EKG in AM: CBC, CMP and Mg    Thank you for your consult.     Karlo Mcclain, ANP  Cardiology  [facility]   Physician addendum:  I have seen and examined this patient as a split-shared visit with the KAITLYNN d/t complicated medical management of above problems written in assessment and high acuity requiring physician  expertise in medical decision-making. I performed the substantive portion of the history and exam. Above medical decision-making is also formulated by me.    Cardiovascular exam:  S1, S2  Lungs:  fine crackles at bases.  Extremities:  + trace edema bilaterally    Plan:  Typical chest pain.  Plan for cardiac catheterization.  Start aspirin and heparin.  We will follow up.  Joe Hills MD  Cardiologist         [1]   Current Facility-Administered Medications on File Prior to Encounter   Medication Dose Route Frequency Provider Last Rate Last Admin    [COMPLETED] albuterol-ipratropium 2.5 mg-0.5 mg/3 mL nebulizer solution 3 mL  3 mL Nebulization ED 1 Time Cristian Rush MD   3 mL at 02/25/25 2117    [COMPLETED] aspirin chewable tablet 324 mg  324 mg Oral Once Cristian Rush MD   324 mg at 02/26/25 0016    [COMPLETED] enoxaparin injection 120 mg  1 mg/kg (Dosing Weight) Subcutaneous ED 1 Time Cristian Rush MD   120 mg at 02/26/25 0017    [COMPLETED] iohexoL (OMNIPAQUE 350) injection 100 mL  100 mL Intravenous ONCE PRN Cristian Rush MD   100 mL at 02/25/25 2203    [DISCONTINUED] ALPRAZolam tablet 0.25 mg  0.25 mg Oral TID PRN Charanjit Lopez Q., NP        [DISCONTINUED] metoprolol tartrate (LOPRESSOR) tablet 25 mg  25 mg Oral BID Charanjit Lopez Q., NP   25 mg at 02/26/25 0016    [DISCONTINUED] nitroGLYCERIN 2% TD oint ointment 1 inch  1 inch Topical (Top) Q6H Charanjit Lopez Q., NP   1 inch at 02/26/25 0017    [DISCONTINUED] nitroGLYCERIN SL tablet 0.4 mg  0.4 mg Sublingual Q5 Min PRN Charanjit Lopez Q., NP        [DISCONTINUED] valsartan tablet 160 mg  160 mg Oral BID JessicaRichCharanjit Q., NP   160 mg at 02/26/25 0038    [DISCONTINUED] zolpidem tablet 5 mg  5 mg Oral Nightly PRN Charanjit Lopez Q., NP         Current Outpatient Medications on File Prior to Encounter   Medication Sig Dispense Refill    albuterol (PROVENTIL HFA) 90 mcg/actuation inhaler Inhale 2 puffs into the lungs every 6 (six) hours as needed for  Wheezing. 54 g 2    docusate sodium (COLACE) 100 MG capsule Take 100 mg by mouth 2 (two) times daily as needed for Constipation.      ergocalciferol (ERGOCALCIFEROL) 50,000 unit Cap Take 50,000 Units by mouth every 7 days.      furosemide (LASIX) 40 MG tablet TAKE 1 TABLET EVERY DAY 90 tablet 3    gabapentin (NEURONTIN) 100 MG capsule TAKE ONE CAPSULE BY MOUTH EVERY MORNING AND TAKE TWO CAPSULES BY MOUTH EVERY DAY AT BEDTIME 270 capsule 3    levothyroxine (SYNTHROID) 88 MCG tablet TAKE 1 TABLET EVERY DAY 90 tablet 3    metoprolol tartrate (LOPRESSOR) 50 MG tablet Take 50 mg by mouth Daily.      multivitamin with minerals tablet Take 1 tablet by mouth once daily.      nitroGLYCERIN (NITROSTAT) 0.4 MG SL tablet Place 1 tablet (0.4 mg total) under the tongue every 5 (five) minutes as needed for Chest pain (if no relief report to ER). 3 tablet 0    olmesartan (BENICAR) 40 MG tablet Take 40 mg by mouth.      potassium chloride SA (K-DUR,KLOR-CON) 20 MEQ tablet TAKE 1 TABLET ONE TIME DAILY 90 tablet 3    rosuvastatin (CRESTOR) 20 MG tablet TAKE 1 TABLET EVERY DAY 90 tablet 3    traMADoL (ULTRAM) 50 mg tablet Take 1 tablet (50 mg total) by mouth every 12 (twelve) hours as needed for Pain. 60 tablet 0    XARELTO 20 mg Tab Take 20 mg by mouth Daily.      fluticasone propionate (FLONASE) 50 mcg/actuation nasal spray USE 2 SPRAY(S) IN EACH NOSTRIL ONCE DAILY FOR 10 DAYS

## 2025-02-26 NOTE — Clinical Note
The catheter was inserted into the, was removed from the and was inserted over the wire into the ostium   circumflex. Hemodynamics were performed.  IFR 1.00

## 2025-02-26 NOTE — Clinical Note
The catheter was inserted into the, was removed from the and was inserted over the wire into the left ventricle. Hemodynamics were performed.  An angiography was performed of the left coronary arteries. Multiple views were taken. The angiography was performed via power injection.

## 2025-02-26 NOTE — Clinical Note
The catheter was inserted into the, was removed from the and was inserted over the wire into the left ventricle. Hemodynamics were performed.  An angiography was performed of the right coronary arteries. The angiography was performed via power injection.   50%.

## 2025-02-26 NOTE — H&P
Patient name: Vesta Lala  MRN: [id number]  : 1945  Cath Lab Procedure H&P Update    Pre-Procedure Assessment:    I saw and examined the patient face to face. The patient has been re-evaluated and her condition is unchanged. The reason for admission, procedure and care is still present.  Based on the patients H&P, pre-procedure physical exam, relevant diagnostic studies, NPO status and information obtained from the patient, I determine the patient is an appropriate candidate for the proposed procedure and anesthesia planned. I further certify the anesthesia risks, benefits and options have been explained to the patient to which she agrees as documented on the procedural consent.    See scanned updated H&P and additional records from media tab.      Ángel Bates

## 2025-02-27 LAB
ALBUMIN SERPL-MCNC: 3.5 G/DL (ref 3.4–4.8)
ALBUMIN/GLOB SERPL: 1.3 RATIO (ref 1.1–2)
ALP SERPL-CCNC: 71 UNIT/L (ref 40–150)
ALT SERPL-CCNC: 9 UNIT/L (ref 0–55)
ANION GAP SERPL CALC-SCNC: 11 MEQ/L
AST SERPL-CCNC: 18 UNIT/L (ref 5–34)
BASOPHILS # BLD AUTO: 0.06 X10(3)/MCL
BASOPHILS NFR BLD AUTO: 0.9 %
BILIRUB SERPL-MCNC: 0.3 MG/DL
BSA FOR ECHO PROCEDURE: 2.31 M2
BUN SERPL-MCNC: 11.4 MG/DL (ref 9.8–20.1)
CALCIUM SERPL-MCNC: 9.1 MG/DL (ref 8.4–10.2)
CHLORIDE SERPL-SCNC: 112 MMOL/L (ref 98–107)
CO2 SERPL-SCNC: 24 MMOL/L (ref 23–31)
CREAT SERPL-MCNC: 0.65 MG/DL (ref 0.55–1.02)
CREAT/UREA NIT SERPL: 18
EOSINOPHIL # BLD AUTO: 0.08 X10(3)/MCL (ref 0–0.9)
EOSINOPHIL NFR BLD AUTO: 1.2 %
ERYTHROCYTE [DISTWIDTH] IN BLOOD BY AUTOMATED COUNT: 14.5 % (ref 11.5–17)
GFR SERPLBLD CREATININE-BSD FMLA CKD-EPI: >60 ML/MIN/1.73/M2
GLOBULIN SER-MCNC: 2.6 GM/DL (ref 2.4–3.5)
GLUCOSE SERPL-MCNC: 107 MG/DL (ref 82–115)
HCT VFR BLD AUTO: 38.3 % (ref 37–47)
HGB BLD-MCNC: 11.4 G/DL (ref 12–16)
IMM GRANULOCYTES # BLD AUTO: 0.02 X10(3)/MCL (ref 0–0.04)
IMM GRANULOCYTES NFR BLD AUTO: 0.3 %
LYMPHOCYTES # BLD AUTO: 1.43 X10(3)/MCL (ref 0.6–4.6)
LYMPHOCYTES NFR BLD AUTO: 20.7 %
MAGNESIUM SERPL-MCNC: 1.9 MG/DL (ref 1.6–2.6)
MCH RBC QN AUTO: 28.6 PG (ref 27–31)
MCHC RBC AUTO-ENTMCNC: 29.8 G/DL (ref 33–36)
MCV RBC AUTO: 96 FL (ref 80–94)
MONOCYTES # BLD AUTO: 0.35 X10(3)/MCL (ref 0.1–1.3)
MONOCYTES NFR BLD AUTO: 5.1 %
NEUTROPHILS # BLD AUTO: 4.96 X10(3)/MCL (ref 2.1–9.2)
NEUTROPHILS NFR BLD AUTO: 71.8 %
NRBC BLD AUTO-RTO: 0 %
OHS QRS DURATION: 88 MS
OHS QTC CALCULATION: 486 MS
PLATELET # BLD AUTO: 161 X10(3)/MCL (ref 130–400)
PMV BLD AUTO: 11.5 FL (ref 7.4–10.4)
POCT GLUCOSE: 125 MG/DL (ref 70–110)
POTASSIUM SERPL-SCNC: 3.9 MMOL/L (ref 3.5–5.1)
PROT SERPL-MCNC: 6.1 GM/DL (ref 5.8–7.6)
RBC # BLD AUTO: 3.99 X10(6)/MCL (ref 4.2–5.4)
SODIUM SERPL-SCNC: 147 MMOL/L (ref 136–145)
WBC # BLD AUTO: 6.9 X10(3)/MCL (ref 4.5–11.5)

## 2025-02-27 PROCEDURE — 25000003 PHARM REV CODE 250: Performed by: NURSE PRACTITIONER

## 2025-02-27 PROCEDURE — 36415 COLL VENOUS BLD VENIPUNCTURE: CPT

## 2025-02-27 PROCEDURE — 25000003 PHARM REV CODE 250: Performed by: INTERNAL MEDICINE

## 2025-02-27 PROCEDURE — 25000003 PHARM REV CODE 250

## 2025-02-27 PROCEDURE — 93005 ELECTROCARDIOGRAM TRACING: CPT

## 2025-02-27 PROCEDURE — 63600175 PHARM REV CODE 636 W HCPCS: Performed by: NURSE PRACTITIONER

## 2025-02-27 PROCEDURE — 93010 ELECTROCARDIOGRAM REPORT: CPT | Mod: ,,, | Performed by: INTERNAL MEDICINE

## 2025-02-27 PROCEDURE — 85025 COMPLETE CBC W/AUTO DIFF WBC: CPT

## 2025-02-27 PROCEDURE — 83735 ASSAY OF MAGNESIUM: CPT

## 2025-02-27 PROCEDURE — 80053 COMPREHEN METABOLIC PANEL: CPT

## 2025-02-27 PROCEDURE — 21400001 HC TELEMETRY ROOM

## 2025-02-27 PROCEDURE — 25000242 PHARM REV CODE 250 ALT 637 W/ HCPCS: Performed by: INTERNAL MEDICINE

## 2025-02-27 PROCEDURE — 97162 PT EVAL MOD COMPLEX 30 MIN: CPT

## 2025-02-27 RX ORDER — FUROSEMIDE 10 MG/ML
40 INJECTION INTRAMUSCULAR; INTRAVENOUS EVERY 12 HOURS
Status: COMPLETED | OUTPATIENT
Start: 2025-02-27 | End: 2025-02-27

## 2025-02-27 RX ORDER — FLUTICASONE PROPIONATE 50 MCG
2 SPRAY, SUSPENSION (ML) NASAL DAILY
Status: DISCONTINUED | OUTPATIENT
Start: 2025-02-27 | End: 2025-02-28 | Stop reason: HOSPADM

## 2025-02-27 RX ORDER — LANOLIN ALCOHOL/MO/W.PET/CERES
400 CREAM (GRAM) TOPICAL 2 TIMES DAILY
Status: DISCONTINUED | OUTPATIENT
Start: 2025-02-27 | End: 2025-02-28 | Stop reason: HOSPADM

## 2025-02-27 RX ORDER — INSULIN ASPART 100 [IU]/ML
0-5 INJECTION, SOLUTION INTRAVENOUS; SUBCUTANEOUS
Status: DISCONTINUED | OUTPATIENT
Start: 2025-02-27 | End: 2025-02-28 | Stop reason: HOSPADM

## 2025-02-27 RX ORDER — METOPROLOL SUCCINATE 50 MG/1
50 TABLET, EXTENDED RELEASE ORAL DAILY
Status: DISCONTINUED | OUTPATIENT
Start: 2025-02-28 | End: 2025-02-28 | Stop reason: HOSPADM

## 2025-02-27 RX ORDER — FAMOTIDINE 20 MG/1
20 TABLET, FILM COATED ORAL 2 TIMES DAILY
Status: DISCONTINUED | OUTPATIENT
Start: 2025-02-27 | End: 2025-02-28 | Stop reason: HOSPADM

## 2025-02-27 RX ADMIN — LOSARTAN POTASSIUM 50 MG: 50 TABLET, FILM COATED ORAL at 08:02

## 2025-02-27 RX ADMIN — ASPIRIN 81 MG: 81 TABLET, COATED ORAL at 08:02

## 2025-02-27 RX ADMIN — FAMOTIDINE 20 MG: 20 TABLET, FILM COATED ORAL at 08:02

## 2025-02-27 RX ADMIN — FUROSEMIDE 40 MG: 10 INJECTION, SOLUTION INTRAMUSCULAR; INTRAVENOUS at 12:02

## 2025-02-27 RX ADMIN — Medication 400 MG: at 08:02

## 2025-02-27 RX ADMIN — FLUTICASONE PROPIONATE 100 MCG: 50 SPRAY, METERED NASAL at 04:02

## 2025-02-27 RX ADMIN — FUROSEMIDE 40 MG: 10 INJECTION, SOLUTION INTRAMUSCULAR; INTRAVENOUS at 08:02

## 2025-02-27 RX ADMIN — FAMOTIDINE 20 MG: 20 TABLET, FILM COATED ORAL at 12:02

## 2025-02-27 RX ADMIN — LEVOTHYROXINE SODIUM 88 MCG: 88 TABLET ORAL at 05:02

## 2025-02-27 RX ADMIN — ACETAMINOPHEN 1000 MG: 500 TABLET ORAL at 08:02

## 2025-02-27 RX ADMIN — RIVAROXABAN 20 MG: 10 TABLET, FILM COATED ORAL at 04:02

## 2025-02-27 NOTE — PROGRESS NOTES
[facility]    Cardiology  Progress Note    Patient Name: Vesta Lala  MRN: [id number]  Admission Date: 2/26/2025  Hospital Length of Stay: 1 days  Code Status: Full Code   Attending Provider: Malcolm Gilbert MD   Consulting Provider: LORRAINE Morales  Primary Care Physician: Stephanie Sotelo MD  Principal Problem:<principal problem not specified>    Patient information was obtained from patient, past medical records, and ER records.     Subjective:   Chief Complaint/Reason for Consult: NSTEMI     HPI: Ms. Lala is a 80 y/o female who is known to [facility], Dr. Jama. The patient presented to [facility] on 2.26.25 with c/o CP. The patient was recently evaluated in the clinic and had c/o IVY. At that time she was scheduled for an OP PET Scan which she has not undergone yet. She reported that she was in her normal state of health when she developed some Chest Tightness. She reported that the pain was located to her Anterior Chest Wall and was rated 8/10 on a verbal scale and was associated with SOB. It occurred with exertion and relieve with rest. She presented to the ER and was evaluated by Telecardiology for an Elevated Troponin with recommendations to Transfer the PT to a PCI Capable Facility. Initial workup revealed: H&H 12.1/38.8, , Na 146, K 4.1, Cl 111, BUN/Crea 19.2/0.78, Glucose 142, .9, Troponin 0.356. She was admitted to  and Cardiology was consulted for NSTEMI.    Hospital Course:  2.27.25: NAD Noted. Vitals Stable. Denies CP. Reports intermittent SOB. Results of Cath and [facility] plan of care reviewed extensively with the patient and her daughter at bedside.     PMH: CAD, AF, HTN, HLD, Hypothyroidism, Spinal Stenosis, CLAY/CPAP, MO  PSH: LHC (10.30.12), Carpal Tunnel Release, Cholecystectomy, Gastric Sleeve, Hysterectomy, Joint Replacement, Tonsillectomy.  Family History: Non-Contributory  Social History: Denies Tobacco, ETOH, Illicit Drug Use     Previous  Cardiac Diagnostics:   [facility] (2.26.25):  Coronary angiography:  Left main coronary artery:  No obstructive disease.  Large vessel.    Left anterior descending coronary artery:  Shows disease of 50-60% in the midportion.  The distal portion becomes small.    Ramus intermedius: Shows disease of 50-60% very proximally.  No obstructive lesions.    Left circumflex coronary artery:  Shows disease 50-60% in the mid segment.  No obstructive lesions  Right coronary artery: Very large and dominant, with no significant obstructive disease.    Instantaneous wave free ratio (IFR) to the mid left circumflex, ramus intermedius and mid LAD, for the presence of 50-60% lesions.  IFR results:  Left circumflex:  1.00, indicating the absence of obstructive coronary artery disease.  Ramus intermedius: 1.00, indicating the absence of obstructive coronary artery disease.    Mid LAD:  0.94, indicating the absence of obstructive coronary artery disease.  Guide catheter: EBU 3.5.    EF 55%     ECHO 11.27.23:  Left Ventricle: The left ventricle is normal in size. Normal wall thickness. Normal wall motion. There is normal systolic function with a visually estimated ejection fraction of 60 - 65%. Ejection fraction by visual approximation is 65%.  Right Ventricle: Normal right ventricular cavity size. Systolic function is normal.  Pericardium: There is a small effusion. No indication of cardiac tamponade.  Technically difficult study due to poor acoustic window.    ECHO 4.19.21:  The study quality is below average.   The left ventricle is normal in size. Global left ventricular systolic function is normal. The left ventricular ejection fraction is 65%.   Mild to moderate (1-2+) aortic regurgitation. Mild (1+) mitral regurgitation. Mild (1+) tricuspid regurgitation.  The pulmonary artery systolic pressure is 29 mmHg.      Carotid US 4.19.21:  The study quality is average.   1-39% stenosis in the proximal right internal carotid artery based on Bluth  Criteria.   1-39% stenosis in the proximal left internal carotid artery based on Bluth Criteria.   Antegrade right vertebral artery flow.   Antegrade left vertebral artery flow.      PET 11.3.17:  Normal perfusion study, no perfusion defect.    No evidence of ischemia     [facility] 10.30.2012:  Left main is normal giving rise to left anterior descending artery and left circumflex artery  Left anterior descending artery.  The patient does have diffuse 30-40% disease  in the mid to distal vessel, smaller caliber distal vessel; however, no obstructive lesion.  Diagonal was a small to moderate sized branch with a 40% mid vessel stenosis  Left circumflex artery gives rise to two large obtuse marginal branches with proximal 30% stenosis of second obtuse marginal.  Right coronary artery is a large right dominant vessel without evidence of disease.  Left ventriculogram revealed left ventricular function, ejection fraction 55%    Review of patient's allergies indicates:   Allergen Reactions    Adhesive      Other reaction(s): rash    Adhesive tape-silicones      Other reaction(s): rash    Dexlansoprazole      HA and did not work    Hydrocodone-acetaminophen      Other reaction(s): anxious, itching    Lovastatin     Meperidine      Other reaction(s): N/v    Simvastatin      No current facility-administered medications on file prior to encounter.     Current Outpatient Medications on File Prior to Encounter   Medication Sig    albuterol (PROVENTIL HFA) 90 mcg/actuation inhaler Inhale 2 puffs into the lungs every 6 (six) hours as needed for Wheezing.    docusate sodium (COLACE) 100 MG capsule Take 100 mg by mouth 2 (two) times daily as needed for Constipation.    ergocalciferol (ERGOCALCIFEROL) 50,000 unit Cap Take 50,000 Units by mouth every 7 days.    furosemide (LASIX) 40 MG tablet TAKE 1 TABLET EVERY DAY    gabapentin (NEURONTIN) 100 MG capsule TAKE ONE CAPSULE BY MOUTH EVERY MORNING AND TAKE TWO CAPSULES BY MOUTH EVERY DAY AT  BEDTIME    levothyroxine (SYNTHROID) 88 MCG tablet TAKE 1 TABLET EVERY DAY    metoprolol tartrate (LOPRESSOR) 50 MG tablet Take 50 mg by mouth Daily.    multivitamin with minerals tablet Take 1 tablet by mouth once daily.    nitroGLYCERIN (NITROSTAT) 0.4 MG SL tablet Place 1 tablet (0.4 mg total) under the tongue every 5 (five) minutes as needed for Chest pain (if no relief report to ER).    olmesartan (BENICAR) 40 MG tablet Take 40 mg by mouth.    potassium chloride SA (K-DUR,KLOR-CON) 20 MEQ tablet TAKE 1 TABLET ONE TIME DAILY    rosuvastatin (CRESTOR) 20 MG tablet TAKE 1 TABLET EVERY DAY    traMADoL (ULTRAM) 50 mg tablet Take 1 tablet (50 mg total) by mouth every 12 (twelve) hours as needed for Pain.    XARELTO 20 mg Tab Take 20 mg by mouth Daily.    fluticasone propionate (FLONASE) 50 mcg/actuation nasal spray USE 2 SPRAY(S) IN EACH NOSTRIL ONCE DAILY FOR 10 DAYS     Review of Systems   Respiratory:  Positive for shortness of breath. Negative for chest tightness.    Cardiovascular:  Negative for chest pain.   All other systems reviewed and are negative.    Objective:     Vital Signs (Most Recent):  Temp: 98.9 °F (37.2 °C) (02/27/25 0729)  Pulse: 85 (02/27/25 0800)  Resp: 16 (02/27/25 0729)  BP: (!) 157/86 (02/27/25 0729)  SpO2: (!) 94 % (02/27/25 0729) Vital Signs (24h Range):  Temp:  [97.6 °F (36.4 °C)-98.9 °F (37.2 °C)] 98.9 °F (37.2 °C)  Pulse:  [68-88] 85  Resp:  [16] 16  SpO2:  [92 %-95 %] 94 %  BP: (113-157)/(64-86) 157/86   Weight: 117.9 kg (260 lb)  Body mass index is 44.63 kg/m².  SpO2: (!) 94 %       Intake/Output Summary (Last 24 hours) at 2/27/2025 1132  Last data filed at 2/27/2025 0608  Gross per 24 hour   Intake 0 ml   Output --   Net 0 ml     Lines/Drains/Airways       None                 Significant Labs:   Chemistries:   Recent Labs   Lab 02/25/25  2110 02/25/25  2255 02/26/25  0451 02/27/25  0406   *  --   --  147*   K 4.1  --   --  3.9   *  --   --  112*   CO2 25  --   --  24    BUN 19.2  --   --  11.4   CREATININE 0.78  --   --  0.65   CALCIUM 9.3  --   --  9.1   BILITOT 0.3  --   --  0.3   ALKPHOS 79  --   --  71   ALT 12  --   --  9   AST 23  --   --  18   GLUCOSE 142*  --   --  107   MG  --   --   --  1.90   TROPONINI 0.356* 0.318* 0.308*  --         CBC/Anemia Labs: Coags:    Recent Labs   Lab 02/25/25  2110 02/26/25  1203 02/27/25  0407   WBC 8.15 6.82 6.90   HGB 12.1 11.6* 11.4*   HCT 38.8 37.1 38.3    188 161   MCV 91.3 90.7 96.0*   RDW 14.1 14.2 14.5    Recent Labs   Lab 02/26/25  1203   INR 1.3   APTT 34.8*        Telemetry: Sinus Rhythm    Physical Exam  Vitals and nursing note reviewed.   Constitutional:       General: She is not in acute distress.     Appearance: She is obese.   HENT:      Mouth/Throat:      Mouth: Mucous membranes are moist.      Pharynx: Oropharynx is clear.   Cardiovascular:      Rate and Rhythm: Normal rate and regular rhythm.      Heart sounds: Normal heart sounds.   Pulmonary:      Effort: Pulmonary effort is normal. No respiratory distress.      Breath sounds: Normal breath sounds. No wheezing or rales.   Abdominal:      Palpations: Abdomen is soft.   Musculoskeletal:      Right lower leg: No edema.      Left lower leg: No edema.      Comments: BLE Warm   Skin:     General: Skin is warm and dry.      Comments: Right Radial Cath Site Soft with bruising noted. 2+ Right Radial Pulse Noted. Right Groin Soft with no evidence of hematoma noted.    Neurological:      Mental Status: She is alert. Mental status is at baseline.   Psychiatric:         Behavior: Behavior normal.       Home Medications:   Medications Ordered Prior to Encounter[1]  Current Schedule Inpatient Medications:   aspirin  81 mg Oral Daily    furosemide (LASIX) injection  40 mg Intravenous Q12H    levothyroxine  88 mcg Oral Before breakfast    losartan  50 mg Oral Daily    metoprolol tartrate  50 mg Oral Daily     Assessment:   NSTEMI Type II due to Heart Failure    - Troponin Peak  0.356  CAD (Moderate Nonobstructive Disease) (2.26.25)  Acute Diastolic Heart Failure    - EF 55% (LV Gram 2.26.25)/Elevated EDP  PAF- Now SR    - CHADsVASc - 5 Points - 7.2% Stroke Risk per Year     - On Xarelto for Stroke Risk Reduction  Hypertension (BP Stable)  Hyperlipidemia    - Not on Statin due to Intolerance (Tolerates Crestor)  CLAY/CPAP  Elevated BMI/Morbid Obesity  Hypothyroidism    - On Oral Replacement Therapy  No History of GI Bleed     Plan:   Resume Xarelto 20 Mg PO Daily (Re AF/Stroke Risk Reduction). Continue Aspirin 81 Mg Daily  Continue Toprol XL 50 Mg  PO Daily and Losartan 50 Mg PO Daily  Start Lasix 40 Mg IVP q12h x 2 Doses; Ensure Accurate I/O & Daily Weights  Plan Resumption of Crestor 20 Mg at discharge  Consult PT to Mobilize Patient to test functional capacity  Follow up Echo Results  BMP/Mag in AM    LORRAINE Morales  Cardiology  [facility]   Physician addendum:  I have seen and examined this patient as a split-shared visit with the KAITLYNN d/t complicated medical management of above problems written in assessment and high acuity requiring physician expertise in medical decision-making. I performed the substantive portion of the history and exam. Above medical decision-making is also formulated by me.    Cardiovascular exam:  S1, S2  Lungs:  fine crackles at bases.  Extremities:  + edema bilaterally    Plan:  Medications as above.  Continue supportive therapy.     Joe Hills MD  Cardiologist         [1]   No current facility-administered medications on file prior to encounter.     Current Outpatient Medications on File Prior to Encounter   Medication Sig Dispense Refill    albuterol (PROVENTIL HFA) 90 mcg/actuation inhaler Inhale 2 puffs into the lungs every 6 (six) hours as needed for Wheezing. 54 g 2    docusate sodium (COLACE) 100 MG capsule Take 100 mg by mouth 2 (two) times daily as needed for Constipation.      ergocalciferol (ERGOCALCIFEROL) 50,000 unit Cap Take  50,000 Units by mouth every 7 days.      furosemide (LASIX) 40 MG tablet TAKE 1 TABLET EVERY DAY 90 tablet 3    gabapentin (NEURONTIN) 100 MG capsule TAKE ONE CAPSULE BY MOUTH EVERY MORNING AND TAKE TWO CAPSULES BY MOUTH EVERY DAY AT BEDTIME 270 capsule 3    levothyroxine (SYNTHROID) 88 MCG tablet TAKE 1 TABLET EVERY DAY 90 tablet 3    metoprolol tartrate (LOPRESSOR) 50 MG tablet Take 50 mg by mouth Daily.      multivitamin with minerals tablet Take 1 tablet by mouth once daily.      nitroGLYCERIN (NITROSTAT) 0.4 MG SL tablet Place 1 tablet (0.4 mg total) under the tongue every 5 (five) minutes as needed for Chest pain (if no relief report to ER). 3 tablet 0    olmesartan (BENICAR) 40 MG tablet Take 40 mg by mouth.      potassium chloride SA (K-DUR,KLOR-CON) 20 MEQ tablet TAKE 1 TABLET ONE TIME DAILY 90 tablet 3    rosuvastatin (CRESTOR) 20 MG tablet TAKE 1 TABLET EVERY DAY 90 tablet 3    traMADoL (ULTRAM) 50 mg tablet Take 1 tablet (50 mg total) by mouth every 12 (twelve) hours as needed for Pain. 60 tablet 0    XARELTO 20 mg Tab Take 20 mg by mouth Daily.      fluticasone propionate (FLONASE) 50 mcg/actuation nasal spray USE 2 SPRAY(S) IN EACH NOSTRIL ONCE DAILY FOR 10 DAYS

## 2025-02-27 NOTE — PLAN OF CARE
Problem: Adult Inpatient Plan of Care  Goal: Plan of Care Review  Outcome: Progressing  Goal: Patient-Specific Goal (Individualized)  Outcome: Progressing  Goal: Absence of Hospital-Acquired Illness or Injury  Outcome: Progressing  Goal: Optimal Comfort and Wellbeing  Outcome: Progressing  Goal: Readiness for Transition of Care  Outcome: Progressing     Problem: Diabetes Comorbidity  Goal: Blood Glucose Level Within Targeted Range  Outcome: Progressing     Problem: Bariatric Environmental Safety  Goal: Safety Maintained with Care  Outcome: Progressing     Problem: Fall Injury Risk  Goal: Absence of Fall and Fall-Related Injury  Outcome: Progressing     Problem: Skin Injury Risk Increased  Goal: Skin Health and Integrity  Outcome: Progressing

## 2025-02-27 NOTE — PLAN OF CARE
Problem: Physical Therapy  Goal: Physical Therapy Goal  Description: Goals to be met by: d/c     Patient will increase functional independence with mobility by performin. Supine to sit with Modified [place]  2. Sit to supine with Modified [place]  3. Sit to stand transfer with Modified [place]  4. Bed to chair transfer with Modified [place] using Rolling Walker  5. Gait  x 150 feet with Modified [place] using Rolling Walker.     Outcome: Progressing

## 2025-02-27 NOTE — PT/OT/SLP EVAL
Physical Therapy Evaluation    Patient Name:  Vesta Lala   MRN:  [id number]    Recommendations:     Discharge therapy intensity: Low Intensity Therapy   Discharge Equipment Recommendations: none   Barriers to discharge: Ongoing medical needs    Assessment:     Vesta Lala is a 79 y.o. female admitted with a medical diagnosis of NSTEMI, CAD, heart failure.  She presents with the following impairments/functional limitations: weakness, impaired balance, impaired endurance, gait instability, decreased lower extremity function. Pt tolerated session well, ambulatory to and from bathroom with 4ww with seated rest on commode for void. SOB noted with minimal exertion but 02 stable, elevated RR ~35. Will  3x/wk to improve endurance.     Rehab Prognosis: Good; patient would benefit from acute skilled PT services to address these deficits and reach maximum level of function.    Recent Surgery: Procedure(s) (LRB):  Left heart cath (Left) 1 Day Post-Op    Plan:     During this hospitalization, patient would benefit from acute PT services 3 x/week to address the identified rehab impairments via gait training, therapeutic activities, therapeutic exercises and progress toward the following goals:    Plan of Care Expires:  03/29/25    Subjective     Chief Complaint: needing to use bathroom  Patient/Family Comments/goals: to return to PLOF  Pain/Comfort:  Pain Rating 1: 0/10    Patients cultural, spiritual, Sabianism conflicts given the current situation:      Living Environment:  Pt lives alone in a trailer with ramp entry.  Prior to admission, patients level of function was [place] with 4ww.  Equipment used at home: rollator.  DME owned (not currently used): none.  Upon discharge, patient will have assistance from family.    Objective:     Communicated with RN prior to session.  Patient found HOB elevated with peripheral IV  upon PT entry to room.    General Precautions: Standard,    Orthopedic Precautions:N/A   Braces:  N/A  Respiratory Status: Room air 97%    Exams:  RLE Strength: WFL  LLE Strength: WFL  Skin integrity: Visible skin intact      Functional Mobility:  Bed Mobility:     Supine to Sit: stand by assistance  Sit to Supine: minimum assistance  Transfers:     Sit to Stand:  stand by assistance with 4 wheeled walker  Toilet Transfer: stand by assistance with  4 wheeled walker  using  Step Transfer  Gait: Pt ambulated to and from the bathroom with 4ww SBA/CGA, seated rest on commode for void. Increased SOB present with minimal exertion.       AM-PAC 6 CLICK MOBILITY  Total Score:22       Treatment & Education:    Patient provided with verbal education education regarding PT role/goals/POC, fall prevention, and safety awareness.  Understanding was verbalized.     Patient left HOB elevated with all lines intact, call button in reach, and RN notified.    GOALS:   Multidisciplinary Problems       Physical Therapy Goals          Problem: Physical Therapy    Goal Priority Disciplines Outcome Interventions   Physical Therapy Goal     PT, PT/OT Progressing    Description: Goals to be met by: d/c     Patient will increase functional independence with mobility by performin. Supine to sit with Modified [place]  2. Sit to supine with Modified [place]  3. Sit to stand transfer with Modified [place]  4. Bed to chair transfer with Modified [place] using Rolling Walker  5. Gait  x 150 feet with Modified [place] using Rolling Walker.                          History:     Past Medical History:   Diagnosis Date    High cholesterol     Hypertension     Internal hemorrhoids 2023    Paroxysmal atrial fibrillation     RSV (acute bronchiolitis due to respiratory syncytial virus) 2023       Past Surgical History:   Procedure Laterality Date    CARPAL TUNNEL RELEASE      CHOLECYSTECTOMY      GASTRIC BYPASS      hysterectomy      JOINT REPLACEMENT      LEFT HEART CATHETERIZATION Left 2025    Procedure:  Left heart cath;  Surgeon: Ángel Bates MD;  Location: [facility] CATH LAB;  Service: Cardiology;  Laterality: Left;    TONSILLECTOMY         Time Tracking:     PT Received On: 02/27/25  PT Start Time: 1343     PT Stop Time: 1353  PT Total Time (min): 10 min     Billable Minutes: Evaluation 10      02/27/2025

## 2025-02-27 NOTE — PLAN OF CARE
02/27/25 1102   Discharge Assessment   Assessment Type Discharge Planning Assessment   Confirmed/corrected address, phone number and insurance Yes   Source of Information patient;family   When was your last doctors appointment?   (PCP is Stephanie Sotelo MD. Patient reports last PCP appointment was a few months ago.)   Reason For Admission SOB   People in Home alone   Do you expect to return to your current living situation? Yes   Do you have help at home or someone to help you manage your care at home? Yes   Who are your caregiver(s) and their phone number(s)? Ольга/Daughter/[phone]   Current cognitive status: Alert/Oriented   Walking or Climbing Stairs Difficulty yes   Mobility Management Ambulates with a RW or Rollator PRN   Dressing/Bathing Difficulty no   Home Accessibility wheelchair accessible   Home Layout Able to live on 1st floor   Equipment Currently Used at Home walker, rolling;rollator   Do you currently have service(s) that help you manage your care at home? No   Do you take prescription medications? Yes   Do you have prescription coverage? Yes   Who is going to help you get home at discharge? Family   How do you get to doctors appointments? car, drives self   Are you on dialysis? No   Do you take coumadin? No   Discharge Plan A Home   Discharge Plan B Home Health   Discharge Plan discussed with: Patient;Adult children     Children will rotate staying with patient a discharge.

## 2025-02-27 NOTE — PROGRESS NOTES
[facility] Hospital Medicine Progress Note        Chief Complaint: Inpatient Follow-up for NSTEMI    HPI per admitting team: Vesta Lala is a 79 y.o. female who  has a past medical history of High cholesterol, Hypertension, Internal hemorrhoids (07/2023), Paroxysmal atrial fibrillation, and RSV (acute bronchiolitis due to respiratory syncytial virus) (11/2023).. The patient presented to [facility] on 2/26/2025 with a primary complaint of chest discomfort that started on 2/25/25. Symptoms started when she was shopping and walking. Lasted for few hrs, till she was seen in the ED and treated with nitro. She did have some nausea and dizziness. Denies any heaviness of left arm, vomiting, fever or chills. She does have chronic SOB and occ dry cough.   Vitals showed mild elevated BP. O2 sats stable. CBC, CMP unremarkable    Interval Hx:   Patient is laying in bed, family is at bedside.  Denied any major complaints.  Informed that Cardiology has told her that they want to watch her 1 more day  I answered all her questions to the best of my knowledge and her satisfaction  Case was discussed with patient's nurse and  on the floor.    Objective/physical exam:  General: In no acute distress, afebrile, elderly female, looks good for her stated age  Chest: Clear to auscultation bilaterally anteriorly but fine basal crepitation laterally.  On room air  Heart: RRR, +S1, S2, no appreciable murmur  Abdomen: Soft, nontender, BS +  MSK: Warm, nonpitting edema bilateral lower extremities, no clubbing or cyanosis  Neurologic: Alert and oriented x4, Cranial nerve II-XII intact, able to move all 4 extremities    VITAL SIGNS: 24 HRS MIN & MAX LAST   Temp  Min: 97.7 °F (36.5 °C)  Max: 98.9 °F (37.2 °C) 98.3 °F (36.8 °C)   BP  Min: 109/80  Max: 157/86 129/77   Pulse  Min: 76  Max: 88  77   Resp  Min: 16  Max: 18 18   SpO2  Min: 93 %  Max: 95 % (!) 94 %     I reviewed the labs below:  Recent Labs   Lab  02/25/25 2110 02/26/25  1203 02/27/25  0407   WBC 8.15 6.82 6.90   RBC 4.25 4.09* 3.99*   HGB 12.1 11.6* 11.4*   HCT 38.8 37.1 38.3   MCV 91.3 90.7 96.0*   MCH 28.5 28.4 28.6   MCHC 31.2* 31.3* 29.8*   RDW 14.1 14.2 14.5    188 161   MPV 10.9* 11.0* 11.5*     Recent Labs   Lab 02/25/25 2110 02/27/25  0406   * 147*   K 4.1 3.9   * 112*   CO2 25 24   BUN 19.2 11.4   CREATININE 0.78 0.65   CALCIUM 9.3 9.1   MG  --  1.90   ALBUMIN 3.4 3.5   ALKPHOS 79 71   ALT 12 9   AST 23 18   BILITOT 0.3 0.3     Microbiology Results (last 7 days)       ** No results found for the last 168 hours. **           See below for Radiology    Intake and Output:  Intake/Output Summary (Last 24 hours) at 2/27/2025 1935  Last data filed at 2/27/2025 1705  Gross per 24 hour   Intake 1320 ml   Output 1200 ml   Net 120 ml       Assessment/Plan:  NSTEMI type 2 secondary to acute diastolic heart failure-elevated end-diastolic pressures  CAD (Moderate Nonobstructive Disease)- left heart catheterization 2/26  PAF- on Xarelto  HTN urgency- resolved  New diagnosis of Diabetes mellitus type 2, A1c 7.0- acceptable for her age  Hypothyroidism  Obstructive sleep apnea on CPAP  Morbid obesity with BMI 44.6           2/26- Limited Echo- EF 55-60%  Troponin peaked to 0.3  2/26- s/p LHC--> nonobstructive coronary artery disease, shortness of breath could be related at least partially to a component of  heart failure with preserved ejection fraction/hypertensive heart disease. She may benefit from diuretics at home/Entresto/Jardiance or Farxig  Cardiology on board, resumed Xarelto 20 mg p.o. daily for stroke risk reduction, continue aspirin 81 mg daily, Toprol 50 mg p.o. daily and losartan 50 mg p.o. daily.  Ordered Lasix 40 mg IV q.12 x2 doses for today  Plan to resume Crestor 20 mg HS at discharge  Home meds reviewed and resumed  Respiratory PCR, influenza a and B, RSV, MRSA- Negative  HbA1c 7.0, new diagnosis of diabetes.  Offered  dietitian consult.  Patient in agreement with meeting with a dietitian to learn about diabetic diet  Dietitian consulted for diabetic education  Therapy evaluated the patient, recommended low intensity.  Patient daughter reported she will be taking her mother to her home till she is stronger  Patient lives alone, able to perform her ADLs at home.  Prefers not to drive  Morning BMP, Mag ordered    VTE prophylaxis:  Home Xarelto     Patient condition:  Stable    Anticipated discharge and Disposition:  Probably home tomorrow if cleared by Cardiology    Critical care note:  Critical care diagnosis:  Acute diastolic heart failure requiring frequent IV Lasix pushes  Critical care interventions: Hands-on evaluation, review of labs/radiographs/records and discussion with patient and family if present  Critical care time spent: 35 minutes        All diagnosis and differential diagnosis have been reviewed; assessment and plan has been documented; I have personally reviewed the labs and test results that are presently available; I have reviewed the patients medication list; I have reviewed the consulting providers response and recommendations. I have reviewed or attempted to review medical records based upon their availability    All of the patient's questions have been  addressed and answered. Patient's is agreeable to the above stated plan. I will continue to monitor closely and make adjustments to medical management as needed.    Portions of this note dictated using EMR integrated voice recognition software, and may be subject to voice recognition errors not corrected at proofreading. Please contact writer for clarification if needed.   _____________________________________________________________________    Nutrition Status:  Nutrition consulted. Most recent weight and BMI monitored-     Measurements:  Wt Readings from Last 1 Encounters:   02/26/25 117.9 kg (260 lb)   Body mass index is 44.63 kg/m².    Patient has been  screened and assessed by RD.    Malnutrition Type:  Context:    Level:      Malnutrition Characteristic Summary:       Interventions/Recommendations (treatment strategy):         A minimum of two characteristics is recommended for diagnosis of either severe or non-severe malnutrition.     Current Med:   aspirin  81 mg Oral Daily    famotidine  20 mg Oral BID    fluticasone propionate  2 spray Each Nostril Daily    furosemide (LASIX) injection  40 mg Intravenous Q12H    levothyroxine  88 mcg Oral Before breakfast    losartan  50 mg Oral Daily    magnesium oxide  400 mg Oral BID    [START ON 2/28/2025] metoprolol succinate  50 mg Oral Daily    rivaroxaban  20 mg Oral Daily with dinner      PRN meds:  Current Facility-Administered Medications:     acetaminophen, 1,000 mg, Oral, Q6H PRN    albuterol, 2 puff, Inhalation, Q6H PRN    aluminum-magnesium hydroxide-simethicone, 30 mL, Oral, QID PRN    bisacodyL, 10 mg, Rectal, Daily PRN    dextrose 50%, 12.5 g, Intravenous, PRN    dextrose 50%, 25 g, Intravenous, PRN    docusate sodium, 100 mg, Oral, BID PRN    glucagon (human recombinant), 1 mg, Intramuscular, PRN    glucose, 16 g, Oral, PRN    glucose, 24 g, Oral, PRN    hydrALAZINE, 10 mg, Intravenous, Q4H PRN    insulin aspart U-100, 0-5 Units, Subcutaneous, QID (AC + HS) PRN    melatonin, 6 mg, Oral, Nightly PRN    ondansetron, 8 mg, Oral, Q8H PRN    polyethylene glycol, 17 g, Oral, BID PRN    sodium chloride 0.9%, 10 mL, Intravenous, PRN    Continuous infusion:       Radiology:   I have personally reviewed the images and agree with radiologist report  Echo Saline Bubble? No; Ultrasound enhancing contrast? Yes    Limited study to assess EF. EF is normal at 55-60%.  Cardiac catheterization    The Mid LAD lesion was 50-60% stenosed.  IFR was performed and it was   0.94, indicating the absence of obstructive coronary artery disease.    The Ramus lesion was 50-60% stenosed.  IFR was performed and it was   1.00, indicating  the absence of obstructive coronary artery disease.    The ejection fraction was calculated to be 55%.    The left ventricular systolic function was normal.    The left ventricular end diastolic pressure was elevated.    The pre-procedure left ventricular end diastolic pressure was 17.    The estimated blood loss was none.    There was non-obstructive coronary artery disease..    There was no mitral valve regurgitation.    There was no aortic valve stenosis.    The mid left circumflex lesion was 50-6% stenosed.  IFR was performed   and it was 1.0, indicating the absence of obstructive coronary artery   disease    Procedures performed:  1. Left heart catheterization, selective coronary angiography and left   ventriculogram.    2. Moderate conscious sedation  3. Instantaneous wave free ratio (IFR) to the mid left circumflex, ramus   intermedius and mid LAD, for the presence of 50-60% lesions.  IFR results:  Left circumflex:  1.00, indicating the absence of obstructive coronary   artery disease.  Ramus intermedius: 1.00, indicating the absence of obstructive coronary   artery disease.    Mid LAD:  0.94, indicating the absence of obstructive coronary artery   disease.  Guide catheter: EBU 3.5.      No procedural complications.    Vascular access:  Right radial artery for the diagnostic study.  Right common femoral artery for the IFR.  The right radial artery was   small and very tortuous proximally, and it was not possible to advance the   guide catheter through it.  Her radial anatomy is not favorable for right   radial interventions.  Closure device for the right common femoral artery access:  Perclose   ProGlide.    Coronary angiography:  1.  Left main coronary artery:  No obstructive disease.  Large vessel.    2. Left anterior descending coronary artery:  Shows disease of 50-60% in   the midportion.  The distal portion becomes small.    3. Ramus intermedius: Shows disease of 50-60% very proximally.  No   obstructive  lesions.    4. Left circumflex coronary artery:  Shows disease 50-60% in the mid   segment.  No obstructive lesions  5. Right coronary artery: Very large and dominant, with no significant   obstructive disease.      Instantaneous wave free ratio (IFR) to the mid left circumflex, ramus   intermedius and mid LAD, for the presence of 50-60% lesions.  IFR results:  Left circumflex:  1.00, indicating the absence of obstructive coronary   artery disease.  Ramus intermedius: 1.00, indicating the absence of obstructive coronary   artery disease.    Mid LAD:  0.94, indicating the absence of obstructive coronary artery   disease.  Guide catheter: EBU 3.5.      Recommendations:     1. Medical management.  LVEDP was slightly elevated.  Her symptoms of   shortness of breath could be related at least partially to a component of   heart failure with preserved ejection fraction/hypertensive heart disease.    She may benefit from diuretics at home/Entresto/Jardiance or Farxiga.    However, no significant obstructive coronary disease is present and the   ejection fraction is normal.      2. She may need a pulmonary evaluation to further evaluate her shortness   of breath.  She does not have a history of smoking but she may still have   lung disease.    Ángel Bates MD, FACC, FACP,  FAHA, [place]  Interventional Cardiologist  [facility]    The procedure log was documented by No documenter listed and verified by   Ángel Bates MD.    Date: 2/26/2025  Time: 2:34 PM        Angel Lawton MD  Department of Hospital Medicine   [facility]   02/27/2025

## 2025-02-28 VITALS
OXYGEN SATURATION: 94 % | HEART RATE: 68 BPM | TEMPERATURE: 98 F | BODY MASS INDEX: 42.3 KG/M2 | RESPIRATION RATE: 17 BRPM | HEIGHT: 64 IN | DIASTOLIC BLOOD PRESSURE: 75 MMHG | SYSTOLIC BLOOD PRESSURE: 134 MMHG | WEIGHT: 247.81 LBS

## 2025-02-28 PROBLEM — I21.4 NSTEMI (NON-ST ELEVATED MYOCARDIAL INFARCTION): Status: ACTIVE | Noted: 2025-02-28

## 2025-02-28 LAB
ANION GAP SERPL CALC-SCNC: 8 MEQ/L
BUN SERPL-MCNC: 11.2 MG/DL (ref 9.8–20.1)
CALCIUM SERPL-MCNC: 8.6 MG/DL (ref 8.4–10.2)
CHLORIDE SERPL-SCNC: 108 MMOL/L (ref 98–107)
CO2 SERPL-SCNC: 29 MMOL/L (ref 23–31)
CREAT SERPL-MCNC: 0.61 MG/DL (ref 0.55–1.02)
CREAT/UREA NIT SERPL: 18
GFR SERPLBLD CREATININE-BSD FMLA CKD-EPI: >60 ML/MIN/1.73/M2
GLUCOSE SERPL-MCNC: 123 MG/DL (ref 82–115)
MAGNESIUM SERPL-MCNC: 1.8 MG/DL (ref 1.6–2.6)
POCT GLUCOSE: 120 MG/DL (ref 70–110)
POTASSIUM SERPL-SCNC: 3.4 MMOL/L (ref 3.5–5.1)
SODIUM SERPL-SCNC: 145 MMOL/L (ref 136–145)

## 2025-02-28 PROCEDURE — 97530 THERAPEUTIC ACTIVITIES: CPT

## 2025-02-28 PROCEDURE — 83735 ASSAY OF MAGNESIUM: CPT | Performed by: NURSE PRACTITIONER

## 2025-02-28 PROCEDURE — 36415 COLL VENOUS BLD VENIPUNCTURE: CPT | Performed by: NURSE PRACTITIONER

## 2025-02-28 PROCEDURE — 25000003 PHARM REV CODE 250: Performed by: NURSE PRACTITIONER

## 2025-02-28 PROCEDURE — 80048 BASIC METABOLIC PNL TOTAL CA: CPT | Performed by: NURSE PRACTITIONER

## 2025-02-28 PROCEDURE — 25000003 PHARM REV CODE 250: Performed by: INTERNAL MEDICINE

## 2025-02-28 RX ORDER — ASPIRIN 81 MG/1
81 TABLET ORAL DAILY
COMMUNITY
Start: 2025-03-01 | End: 2026-03-01

## 2025-02-28 RX ORDER — FAMOTIDINE 20 MG/1
20 TABLET, FILM COATED ORAL 2 TIMES DAILY
Qty: 60 TABLET | Refills: 0 | Status: SHIPPED | OUTPATIENT
Start: 2025-02-28

## 2025-02-28 RX ORDER — FUROSEMIDE 40 MG/1
40 TABLET ORAL DAILY
Qty: 90 TABLET | Refills: 3 | Status: SHIPPED | OUTPATIENT
Start: 2025-02-28

## 2025-02-28 RX ORDER — METOPROLOL SUCCINATE 50 MG/1
50 TABLET, EXTENDED RELEASE ORAL DAILY
Qty: 90 TABLET | Refills: 0 | Status: SHIPPED | OUTPATIENT
Start: 2025-03-01

## 2025-02-28 RX ORDER — LANOLIN ALCOHOL/MO/W.PET/CERES
CREAM (GRAM) TOPICAL
Qty: 60 TABLET | Refills: 0 | Status: SHIPPED | OUTPATIENT
Start: 2025-02-28

## 2025-02-28 RX ORDER — POTASSIUM CHLORIDE 20 MEQ/1
40 TABLET, EXTENDED RELEASE ORAL ONCE
Status: COMPLETED | OUTPATIENT
Start: 2025-02-28 | End: 2025-02-28

## 2025-02-28 RX ADMIN — Medication 400 MG: at 09:02

## 2025-02-28 RX ADMIN — FAMOTIDINE 20 MG: 20 TABLET, FILM COATED ORAL at 09:02

## 2025-02-28 RX ADMIN — LEVOTHYROXINE SODIUM 88 MCG: 88 TABLET ORAL at 05:02

## 2025-02-28 RX ADMIN — POTASSIUM CHLORIDE 40 MEQ: 1500 TABLET, EXTENDED RELEASE ORAL at 09:02

## 2025-02-28 RX ADMIN — METOPROLOL SUCCINATE 50 MG: 50 TABLET, EXTENDED RELEASE ORAL at 09:02

## 2025-02-28 RX ADMIN — LOSARTAN POTASSIUM 50 MG: 50 TABLET, FILM COATED ORAL at 09:02

## 2025-02-28 RX ADMIN — ASPIRIN 81 MG: 81 TABLET, COATED ORAL at 09:02

## 2025-02-28 NOTE — PLAN OF CARE
02/28/25 0933   Discharge Reassessment   Assessment Type Discharge Planning Reassessment   Discharge Plan discussed with: Patient;Adult children   Discharge Plan A Home Health   Discharge Plan B Home Health   DME Needed Upon Discharge  none   Post-Acute Status   Post-Acute Authorization Home Health     Family will assist during recovery. List of home health providers given. FOC obtained for [facility]. Referral sent via [employer]. No DME needed. Patient reports the she has a glucometer at home.

## 2025-02-28 NOTE — DISCHARGE INSTRUCTIONS
ANGIOGRAM/ANGIOPLASTY/STENT PLACEMENT    · WHAT IS AN ANGIOGRAM?  o A catheter was placed through the blood vessel in your groin, contrast was injected into the vessels were taken.    · WHAT IS AN ANGIOPLASTY?  o A catheter was placed through the blood vessel in your groin and directed to an area of blocked blood flow. A balloon, and possibly a metal stent, were used to open the blockage. If no other blockages are present below this area your symptoms should improve. If blockages are present below this area, surgery may be necessary.    · WHAT IS STENT PLACEMENT?  o A catheter was placed in your groin through which a metal mesh tube was placed in a narrowed part of the artery to facilitate blood flow.  o You may feel some discomfort at the insertions site that should improve over a few days.    · POST-OP CARE:  o May remove dressing 24 hours after procedure. (Soak dressing with warm water and gently peel off. DO NOT RIP).  o You may shower with antibacterial soap and water only. NO tub bathing/swimming for 3 days!  o Keep the site clean and dry. No ointments, powder, cologne, or lotion near the site until fully healed. (About 2 weeks)  o Monitor site for signs or symptoms of infection  § Fever >101  § Yellow/green drainage  § Swelling  § Worsening pain  § Dizziness/fainting    o NO LIFTING PUSHING OR PULLING ANYTHING GREATER THAN 10 POUNDS ( A GALLON OF MILK) FOR 1 WEEK.  o NO DRIVING FOR 1 WEEK. (This includes all motor vehicles)    o If you begin to bleed, lay flat immediately, apply firm direct pressure to the site. CALL 911. (THIS IS AN EMERGENCY!!)

## 2025-02-28 NOTE — PLAN OF CARE
02/28/25 1143   Final Note   Assessment Type Final Discharge Note   Anticipated Discharge Disposition Home-Health   Post-Acute Status   Post-Acute Authorization Home Health     Discharge documentation sent to [facility] via [employer].

## 2025-02-28 NOTE — PROGRESS NOTES
[facility]    Cardiology  Progress Note    Patient Name: Vesta Lala  MRN: [id number]  Admission Date: 2/26/2025  Hospital Length of Stay: 2 days  Code Status: Full Code   Attending Provider: Malcolm Gilbert MD   Consulting Provider: LORRAINE Morales  Primary Care Physician: Stephanie Sotelo MD  Principal Problem:<principal problem not specified>    Patient information was obtained from patient, past medical records, and ER records.     Subjective:   Chief Complaint/Reason for Consult: NSTEMI     HPI: Ms. Lala is a 78 y/o female who is known to [facility], Dr. Jama. The patient presented to [facility] on 2.26.25 with c/o CP. The patient was recently evaluated in the clinic and had c/o IVY. At that time she was scheduled for an OP PET Scan which she has not undergone yet. She reported that she was in her normal state of health when she developed some Chest Tightness. She reported that the pain was located to her Anterior Chest Wall and was rated 8/10 on a verbal scale and was associated with SOB. It occurred with exertion and relieve with rest. She presented to the ER and was evaluated by Telecardiology for an Elevated Troponin with recommendations to Transfer the PT to a PCI Capable Facility. Initial workup revealed: H&H 12.1/38.8, , Na 146, K 4.1, Cl 111, BUN/Crea 19.2/0.78, Glucose 142, .9, Troponin 0.356. She was admitted to  and Cardiology was consulted for NSTEMI.    Hospital Course:  2.27.25: NAD Noted. Vitals Stable. Denies CP. Reports intermittent SOB. Results of Cath and [facility] plan of care reviewed extensively with the patient and her daughter at bedside.   2.28.25: NAD Noted. On RA. SR with PACS, Now out of bed. Feeling much better. Ambulated with PT and did well. Diuresis overnight. Stable for DC from [facility] Standpoint.     PMH: CAD, AF, HTN, HLD, Hypothyroidism, Spinal Stenosis, CLAY/CPAP, MO  PSH: LHC (10.30.12), Carpal Tunnel Release, Cholecystectomy,  Gastric Sleeve, Hysterectomy, Joint Replacement, Tonsillectomy.  Family History: Non-Contributory  Social History: Denies Tobacco, ETOH, Illicit Drug Use     Previous Cardiac Diagnostics:   Echocardiogram (Limited) (2.26.25):  Limited study to assess EF. EF is normal at 55-60%.     [facility] (2.26.25):  Coronary angiography:  Left main coronary artery:  No obstructive disease.  Large vessel.    Left anterior descending coronary artery:  Shows disease of 50-60% in the midportion.  The distal portion becomes small.    Ramus intermedius: Shows disease of 50-60% very proximally.  No obstructive lesions.    Left circumflex coronary artery:  Shows disease 50-60% in the mid segment.  No obstructive lesions  Right coronary artery: Very large and dominant, with no significant obstructive disease.    Instantaneous wave free ratio (IFR) to the mid left circumflex, ramus intermedius and mid LAD, for the presence of 50-60% lesions.  IFR results:  Left circumflex:  1.00, indicating the absence of obstructive coronary artery disease.  Ramus intermedius: 1.00, indicating the absence of obstructive coronary artery disease.    Mid LAD:  0.94, indicating the absence of obstructive coronary artery disease.  Guide catheter: EBU 3.5.    EF 55%     ECHO 11.27.23:  Left Ventricle: The left ventricle is normal in size. Normal wall thickness. Normal wall motion. There is normal systolic function with a visually estimated ejection fraction of 60 - 65%. Ejection fraction by visual approximation is 65%.  Right Ventricle: Normal right ventricular cavity size. Systolic function is normal.  Pericardium: There is a small effusion. No indication of cardiac tamponade.  Technically difficult study due to poor acoustic window.    ECHO 4.19.21:  The study quality is below average.   The left ventricle is normal in size. Global left ventricular systolic function is normal. The left ventricular ejection fraction is 65%.   Mild to moderate (1-2+) aortic  regurgitation. Mild (1+) mitral regurgitation. Mild (1+) tricuspid regurgitation.  The pulmonary artery systolic pressure is 29 mmHg.      Carotid US 4.19.21:  The study quality is average.   1-39% stenosis in the proximal right internal carotid artery based on Bluth Criteria.   1-39% stenosis in the proximal left internal carotid artery based on Bluth Criteria.   Antegrade right vertebral artery flow.   Antegrade left vertebral artery flow.      PET 11.3.17:  Normal perfusion study, no perfusion defect.    No evidence of ischemia     [facility] 10.30.2012:  Left main is normal giving rise to left anterior descending artery and left circumflex artery  Left anterior descending artery.  The patient does have diffuse 30-40% disease  in the mid to distal vessel, smaller caliber distal vessel; however, no obstructive lesion.  Diagonal was a small to moderate sized branch with a 40% mid vessel stenosis  Left circumflex artery gives rise to two large obtuse marginal branches with proximal 30% stenosis of second obtuse marginal.  Right coronary artery is a large right dominant vessel without evidence of disease.  Left ventriculogram revealed left ventricular function, ejection fraction 55%    Review of patient's allergies indicates:   Allergen Reactions    Adhesive      Other reaction(s): rash    Adhesive tape-silicones      Other reaction(s): rash    Dexlansoprazole      HA and did not work    Hydrocodone-acetaminophen      Other reaction(s): anxious, itching    Lovastatin     Meperidine      Other reaction(s): N/v    Simvastatin      No current facility-administered medications on file prior to encounter.     Current Outpatient Medications on File Prior to Encounter   Medication Sig    albuterol (PROVENTIL HFA) 90 mcg/actuation inhaler Inhale 2 puffs into the lungs every 6 (six) hours as needed for Wheezing.    docusate sodium (COLACE) 100 MG capsule Take 100 mg by mouth 2 (two) times daily as needed for Constipation.     ergocalciferol (ERGOCALCIFEROL) 50,000 unit Cap Take 50,000 Units by mouth every 7 days.    furosemide (LASIX) 40 MG tablet TAKE 1 TABLET EVERY DAY    gabapentin (NEURONTIN) 100 MG capsule TAKE ONE CAPSULE BY MOUTH EVERY MORNING AND TAKE TWO CAPSULES BY MOUTH EVERY DAY AT BEDTIME    levothyroxine (SYNTHROID) 88 MCG tablet TAKE 1 TABLET EVERY DAY    metoprolol tartrate (LOPRESSOR) 50 MG tablet Take 50 mg by mouth Daily.    multivitamin with minerals tablet Take 1 tablet by mouth once daily.    nitroGLYCERIN (NITROSTAT) 0.4 MG SL tablet Place 1 tablet (0.4 mg total) under the tongue every 5 (five) minutes as needed for Chest pain (if no relief report to ER).    olmesartan (BENICAR) 40 MG tablet Take 40 mg by mouth.    potassium chloride SA (K-DUR,KLOR-CON) 20 MEQ tablet TAKE 1 TABLET ONE TIME DAILY    rosuvastatin (CRESTOR) 20 MG tablet TAKE 1 TABLET EVERY DAY    traMADoL (ULTRAM) 50 mg tablet Take 1 tablet (50 mg total) by mouth every 12 (twelve) hours as needed for Pain.    XARELTO 20 mg Tab Take 20 mg by mouth Daily.    fluticasone propionate (FLONASE) 50 mcg/actuation nasal spray USE 2 SPRAY(S) IN EACH NOSTRIL ONCE DAILY FOR 10 DAYS     Review of Systems   Respiratory:  Negative for chest tightness and shortness of breath.    Cardiovascular:  Negative for chest pain.   All other systems reviewed and are negative.    Objective:     Vital Signs (Most Recent):  Temp: 98.3 °F (36.8 °C) (02/28/25 0823)  Pulse: 71 (02/28/25 0823)  Resp: 17 (02/28/25 0823)  BP: (!) 142/73 (02/28/25 0823)  SpO2: 96 % (02/28/25 0823) Vital Signs (24h Range):  Temp:  [97.6 °F (36.4 °C)-98.4 °F (36.9 °C)] 98.3 °F (36.8 °C)  Pulse:  [71-85] 71  Resp:  [17-18] 17  SpO2:  [91 %-96 %] 96 %  BP: (109-148)/(73-82) 142/73   Weight: 112.4 kg (247 lb 12.8 oz)  Body mass index is 42.53 kg/m².  SpO2: 96 %       Intake/Output Summary (Last 24 hours) at 2/28/2025 1012  Last data filed at 2/28/2025 0518  Gross per 24 hour   Intake 1320 ml   Output 2250 ml    Net -930 ml     Lines/Drains/Airways       None                 Significant Labs:   Chemistries:   Recent Labs   Lab 02/25/25 2110 02/25/25  2255 02/26/25  0451 02/27/25  0406 02/28/25  0458   *  --   --  147* 145   K 4.1  --   --  3.9 3.4*   *  --   --  112* 108*   CO2 25  --   --  24 29   BUN 19.2  --   --  11.4 11.2   CREATININE 0.78  --   --  0.65 0.61   CALCIUM 9.3  --   --  9.1 8.6   BILITOT 0.3  --   --  0.3  --    ALKPHOS 79  --   --  71  --    ALT 12  --   --  9  --    AST 23  --   --  18  --    GLUCOSE 142*  --   --  107 123*   MG  --   --   --  1.90 1.80   TROPONINI 0.356* 0.318* 0.308*  --   --         CBC/Anemia Labs: Coags:    Recent Labs   Lab 02/25/25 2110 02/26/25  1203 02/27/25  0407   WBC 8.15 6.82 6.90   HGB 12.1 11.6* 11.4*   HCT 38.8 37.1 38.3    188 161   MCV 91.3 90.7 96.0*   RDW 14.1 14.2 14.5    Recent Labs   Lab 02/26/25  1203   INR 1.3   APTT 34.8*        Telemetry: Sinus Rhythm with PACS     Physical Exam  Vitals and nursing note reviewed.   Constitutional:       General: She is not in acute distress.     Appearance: She is obese.   HENT:      Mouth/Throat:      Mouth: Mucous membranes are moist.      Pharynx: Oropharynx is clear.   Cardiovascular:      Rate and Rhythm: Normal rate and regular rhythm.      Heart sounds: Normal heart sounds.   Pulmonary:      Effort: Pulmonary effort is normal. No respiratory distress.      Breath sounds: Normal breath sounds. No wheezing or rales.      Comments: On RA  Abdominal:      Palpations: Abdomen is soft.      Tenderness: There is no guarding.   Musculoskeletal:      Right lower leg: No edema.      Left lower leg: No edema.      Comments: BLE Warm   Skin:     General: Skin is warm and dry.   Neurological:      Mental Status: She is alert. Mental status is at baseline.   Psychiatric:         Behavior: Behavior normal.       Home Medications:   Medications Ordered Prior to Encounter[1]  Current Schedule Inpatient  Medications:   aspirin  81 mg Oral Daily    famotidine  20 mg Oral BID    fluticasone propionate  2 spray Each Nostril Daily    levothyroxine  88 mcg Oral Before breakfast    losartan  50 mg Oral Daily    magnesium oxide  400 mg Oral BID    metoprolol succinate  50 mg Oral Daily    rivaroxaban  20 mg Oral Daily with dinner     Assessment:   NSTEMI Type II due to Heart Failure    - Troponin Peak 0.356  CAD (Moderate Nonobstructive Disease) (2.26.25)  Acute Diastolic Heart Failure (Compensated)    - EF 55-60% (2.26.25)/Elevated EDP  PAF- Now SR    - CHADsVASc - 5 Points - 7.2% Stroke Risk per Year     - On Xarelto for Stroke Risk Reduction  Hypertension (BP Stable)  Hyperlipidemia    - Not on Statin due to Intolerance (Tolerates Crestor)  CLAY/CPAP  Elevated BMI/Morbid Obesity  Hypothyroidism    - On Oral Replacement Therapy  No History of GI Bleed     Plan:   Continue Xarelto 20 Mg PO Daily (Re AF/Stroke Risk Reduction). Continue Aspirin 81 Mg Daily  Continue Toprol XL 50 Mg  PO Daily and Losartan 50 Mg PO Daily  Transition to Lasix 40 Mg PO Daily with Extra 40 Mg Tab in evening PRN Weight Gain or SOB  Plan Resumption of Crestor 20 Mg at discharge  Stable for DC From [facility] Standpoint  Check BMP/Mag in 1 week and follow up with Dr. Jama outpatient    Cuca Douglas, [facility]  Cardiology  [facility]   Physician addendum:    I have seen and examined this patient as a split-shared visit with the KAITLYNN d/t complicated medical management of above problems written in assessment and high acuity requiring physician expertise in medical decision-making. I performed the substantive portion of the history and exam. Above medical decision-making is also formulated by me.     Cardiovascular exam:  S1, S2   Lungs:  Fine crackles at bases.   Extremities:  1+ Edema bilaterally     Plan: Meds as above Continue Supportive therapy.   Sign off.   Joe Hills MD  Cardiologist           [1]   No current facility-administered  medications on file prior to encounter.     Current Outpatient Medications on File Prior to Encounter   Medication Sig Dispense Refill    albuterol (PROVENTIL HFA) 90 mcg/actuation inhaler Inhale 2 puffs into the lungs every 6 (six) hours as needed for Wheezing. 54 g 2    docusate sodium (COLACE) 100 MG capsule Take 100 mg by mouth 2 (two) times daily as needed for Constipation.      ergocalciferol (ERGOCALCIFEROL) 50,000 unit Cap Take 50,000 Units by mouth every 7 days.      furosemide (LASIX) 40 MG tablet TAKE 1 TABLET EVERY DAY 90 tablet 3    gabapentin (NEURONTIN) 100 MG capsule TAKE ONE CAPSULE BY MOUTH EVERY MORNING AND TAKE TWO CAPSULES BY MOUTH EVERY DAY AT BEDTIME 270 capsule 3    levothyroxine (SYNTHROID) 88 MCG tablet TAKE 1 TABLET EVERY DAY 90 tablet 3    metoprolol tartrate (LOPRESSOR) 50 MG tablet Take 50 mg by mouth Daily.      multivitamin with minerals tablet Take 1 tablet by mouth once daily.      nitroGLYCERIN (NITROSTAT) 0.4 MG SL tablet Place 1 tablet (0.4 mg total) under the tongue every 5 (five) minutes as needed for Chest pain (if no relief report to ER). 3 tablet 0    olmesartan (BENICAR) 40 MG tablet Take 40 mg by mouth.      potassium chloride SA (K-DUR,KLOR-CON) 20 MEQ tablet TAKE 1 TABLET ONE TIME DAILY 90 tablet 3    rosuvastatin (CRESTOR) 20 MG tablet TAKE 1 TABLET EVERY DAY 90 tablet 3    traMADoL (ULTRAM) 50 mg tablet Take 1 tablet (50 mg total) by mouth every 12 (twelve) hours as needed for Pain. 60 tablet 0    XARELTO 20 mg Tab Take 20 mg by mouth Daily.      fluticasone propionate (FLONASE) 50 mcg/actuation nasal spray USE 2 SPRAY(S) IN EACH NOSTRIL ONCE DAILY FOR 10 DAYS

## 2025-02-28 NOTE — DISCHARGE SUMMARY
[facility] Hospital Medicine Discharge Summary    Admit Date: 2/26/2025  Discharge Date and Time: 2/28/202510:31 AM  Admitting Physician:  Team  Discharging Physician: Angel Lawton MD.  Primary Care Physician: Stephanie Sotelo MD  Consults: Cardiology    Discharge Diagnoses:  NSTEMI type 2 secondary to acute diastolic heart failure-elevated end-diastolic pressures  CAD (Moderate Nonobstructive Disease)- left heart catheterization 2/26  PAF- on Xarelto  HTN urgency- resolved  New diagnosis of Diabetes mellitus type 2, A1c 7.0- acceptable for her age  Hypothyroidism  Obstructive sleep apnea on CPAP  Morbid obesity with BMI 44.6        Hospital Course:   Vesta Lala is a 79 y.o. female who  has a past medical history of High cholesterol, Hypertension, Internal hemorrhoids (07/2023), Paroxysmal atrial fibrillation, and RSV (acute bronchiolitis due to respiratory syncytial virus) (11/2023).. The patient presented to [facility] on 2/26/2025 with a primary complaint of chest discomfort that started on 2/25/25. Symptoms started when she was shopping and walking. Lasted for few hrs, till she was seen in the ED and treated with nitro. She did have some nausea and dizziness. Denies any heaviness of left arm, vomiting, fever or chills. She does have chronic SOB and occ dry cough.   Vitals showed mild elevated BP. O2 sats stable. CBC, CMP unremarkable  2/26- Limited Echo- EF 55-60%  Troponin peaked to 0.3  2/26- s/p LHC--> nonobstructive coronary artery disease, shortness of breath could be related at least partially to a component of  heart failure with preserved ejection fraction/hypertensive heart disease. She may benefit from diuretics at home/Entresto/Jardiance or Farxig  Cardiology on board, resumed Xarelto 20 mg p.o. daily for stroke risk reduction, continue aspirin 81 mg daily, Toprol 50 mg p.o. daily and losartan 50 mg p.o. daily.  Received Lasix 40 mg IV q.12 x2 doses yesterday,  cardiology has cleared the patient for discharge with recommendation to take Lasix 40 mg daily p.o. but if she has weight gain or short of breath, she needs to take another dose in the evening.  Continue KCl at home like before  Potassium 3.4, ordered KCl 40 mEq p.o. x1  Mag 1.8, on Mag oxide 400 mg b.i.d. for 3 days and then 400 mg daily  Crestor resumed at discharge  Respiratory PCR, influenza a and B, RSV, MRSA- Negative  HbA1c 7.0, new diagnosis of diabetes.  Offered dietitian consult.  Patient in agreement with meeting with a dietitian to learn about diabetic diet  Dietitian consulted for diabetic education.  Patient reported her PCP monitors it closely  Therapy evaluated the patient, recommended low intensity.  Family will stay with her till she is back to her baseline. Patient lives alone, able to perform her ADLs at home.  Prefers not to drive    Pt was seen and examined on the day of discharge  Vitals:  VITAL SIGNS: 24 HRS MIN & MAX LAST   Temp  Min: 97.6 °F (36.4 °C)  Max: 98.4 °F (36.9 °C) 98.3 °F (36.8 °C)   BP  Min: 109/80  Max: 148/81 (!) 142/73   Pulse  Min: 71  Max: 85  71   Resp  Min: 17  Max: 18 17   SpO2  Min: 91 %  Max: 96 % 96 %       Physical Exam:  General: In no acute distress, afebrile, elderly female, looks good for her stated age  Chest: Clear to auscultation bilaterally anteriorly but fine basal crepitation laterally.  On room air  Heart: RRR, +S1, S2, no appreciable murmur  Abdomen: Soft, nontender, BS +  MSK: Warm, nonpitting edema bilateral lower extremities, no clubbing or cyanosis  Neurologic: Alert and oriented x4, Cranial nerve II-XII intact, able to move all 4 extremities    Recent Labs   Lab 02/25/25  2110 02/26/25  1203 02/27/25  0407   WBC 8.15 6.82 6.90   RBC 4.25 4.09* 3.99*   HGB 12.1 11.6* 11.4*   HCT 38.8 37.1 38.3   MCV 91.3 90.7 96.0*   MCH 28.5 28.4 28.6   MCHC 31.2* 31.3* 29.8*   RDW 14.1 14.2 14.5    188 161   MPV 10.9* 11.0* 11.5*       Recent Labs   Lab  02/25/25  2110 02/27/25  0406 02/28/25  0458   * 147* 145   K 4.1 3.9 3.4*   * 112* 108*   CO2 25 24 29   BUN 19.2 11.4 11.2   CREATININE 0.78 0.65 0.61   CALCIUM 9.3 9.1 8.6   MG  --  1.90 1.80   ALBUMIN 3.4 3.5  --    ALKPHOS 79 71  --    ALT 12 9  --    AST 23 18  --    BILITOT 0.3 0.3  --         Microbiology Results (last 7 days)       ** No results found for the last 168 hours. **             Echo Saline Bubble? No; Ultrasound enhancing contrast? Yes    Limited study to assess EF. EF is normal at 55-60%.  Cardiac catheterization    The Mid LAD lesion was 50-60% stenosed.  IFR was performed and it was   0.94, indicating the absence of obstructive coronary artery disease.    The Ramus lesion was 50-60% stenosed.  IFR was performed and it was   1.00, indicating the absence of obstructive coronary artery disease.    The ejection fraction was calculated to be 55%.    The left ventricular systolic function was normal.    The left ventricular end diastolic pressure was elevated.    The pre-procedure left ventricular end diastolic pressure was 17.    The estimated blood loss was none.    There was non-obstructive coronary artery disease..    There was no mitral valve regurgitation.    There was no aortic valve stenosis.    The mid left circumflex lesion was 50-6% stenosed.  IFR was performed   and it was 1.0, indicating the absence of obstructive coronary artery   disease    Procedures performed:  1. Left heart catheterization, selective coronary angiography and left   ventriculogram.    2. Moderate conscious sedation  3. Instantaneous wave free ratio (IFR) to the mid left circumflex, ramus   intermedius and mid LAD, for the presence of 50-60% lesions.  IFR results:  Left circumflex:  1.00, indicating the absence of obstructive coronary   artery disease.  Ramus intermedius: 1.00, indicating the absence of obstructive coronary   artery disease.    Mid LAD:  0.94, indicating the absence of obstructive  coronary artery   disease.  Guide catheter: EBU 3.5.      No procedural complications.    Vascular access:  Right radial artery for the diagnostic study.  Right common femoral artery for the IFR.  The right radial artery was   small and very tortuous proximally, and it was not possible to advance the   guide catheter through it.  Her radial anatomy is not favorable for right   radial interventions.  Closure device for the right common femoral artery access:  Perclose   ProGlide.    Coronary angiography:  1.  Left main coronary artery:  No obstructive disease.  Large vessel.    2. Left anterior descending coronary artery:  Shows disease of 50-60% in   the midportion.  The distal portion becomes small.    3. Ramus intermedius: Shows disease of 50-60% very proximally.  No   obstructive lesions.    4. Left circumflex coronary artery:  Shows disease 50-60% in the mid   segment.  No obstructive lesions  5. Right coronary artery: Very large and dominant, with no significant   obstructive disease.      Instantaneous wave free ratio (IFR) to the mid left circumflex, ramus   intermedius and mid LAD, for the presence of 50-60% lesions.  IFR results:  Left circumflex:  1.00, indicating the absence of obstructive coronary   artery disease.  Ramus intermedius: 1.00, indicating the absence of obstructive coronary   artery disease.    Mid LAD:  0.94, indicating the absence of obstructive coronary artery   disease.  Guide catheter: EBU 3.5.      Recommendations:     1. Medical management.  LVEDP was slightly elevated.  Her symptoms of   shortness of breath could be related at least partially to a component of   heart failure with preserved ejection fraction/hypertensive heart disease.    She may benefit from diuretics at home/Entresto/Jardiance or Farxiga.    However, no significant obstructive coronary disease is present and the   ejection fraction is normal.      2. She may need a pulmonary evaluation to further evaluate her  shortness   of breath.  She does not have a history of smoking but she may still have   lung disease.    Ángel Bates MD, FACC, FACP,  FAHA, [facility]  Interventional Cardiologist  [facility]    The procedure log was documented by No documenter listed and verified by   Ángel Bates MD.    Date: 2/26/2025  Time: 2:34 PM         Medication List        START taking these medications      aspirin 81 MG EC tablet  Commonly known as: ECOTRIN  Take 1 tablet (81 mg total) by mouth once daily.  Start taking on: March 1, 2025     famotidine 20 MG tablet  Commonly known as: PEPCID  Take 1 tablet (20 mg total) by mouth 2 (two) times daily.     magnesium oxide 400 mg (241.3 mg magnesium) tablet  Commonly known as: MAG-OX  Take 1 tab a day for 3 days and then 1 tablet daily     metoprolol succinate 50 MG 24 hr tablet  Commonly known as: TOPROL-XL  Take 1 tablet (50 mg total) by mouth once daily.  Start taking on: March 1, 2025            CHANGE how you take these medications      furosemide 40 MG tablet  Commonly known as: LASIX  Take 1 tablet (40 mg total) by mouth once daily. Take an extra dose of lasix int he evening if weight increased, or short of breath  What changed:   when to take this  additional instructions            CONTINUE taking these medications      albuterol 90 mcg/actuation inhaler  Commonly known as: PROVENTIL HFA  Inhale 2 puffs into the lungs every 6 (six) hours as needed for Wheezing.     docusate sodium 100 MG capsule  Commonly known as: COLACE     ergocalciferol 50,000 unit Cap  Commonly known as: ERGOCALCIFEROL     fluticasone propionate 50 mcg/actuation nasal spray  Commonly known as: FLONASE     gabapentin 100 MG capsule  Commonly known as: NEURONTIN  TAKE ONE CAPSULE BY MOUTH EVERY MORNING AND TAKE TWO CAPSULES BY MOUTH EVERY DAY AT BEDTIME     levothyroxine 88 MCG tablet  Commonly known as: SYNTHROID  TAKE 1 TABLET EVERY DAY     multivitamin with minerals tablet      nitroGLYCERIN 0.4 MG SL tablet  Commonly known as: NITROSTAT  Place 1 tablet (0.4 mg total) under the tongue every 5 (five) minutes as needed for Chest pain (if no relief report to ER).     olmesartan 40 MG tablet  Commonly known as: BENICAR     potassium chloride SA 20 MEQ tablet  Commonly known as: K-DUR,KLOR-CON  TAKE 1 TABLET ONE TIME DAILY     rosuvastatin 20 MG tablet  Commonly known as: CRESTOR  TAKE 1 TABLET EVERY DAY     traMADoL 50 mg tablet  Commonly known as: ULTRAM  Take 1 tablet (50 mg total) by mouth every 12 (twelve) hours as needed for Pain.     XARELTO 20 mg Tab  Generic drug: rivaroxaban            STOP taking these medications      metoprolol tartrate 50 MG tablet  Commonly known as: LOPRESSOR               Where to Get Your Medications        These medications were sent to [id number] at [address]  [address] [facility] [address]      Phone: [phone]   famotidine 20 MG tablet  furosemide 40 MG tablet  magnesium oxide 400 mg (241.3 mg magnesium) tablet  metoprolol succinate 50 MG 24 hr tablet       You can get these medications from any pharmacy    You don't need a prescription for these medications  aspirin 81 MG EC tablet          Explained in detail to the patient about the discharge plan, medications, and follow-up visits. Pt understands and agrees with the treatment plan  Discharge Disposition: Home with   Discharged Condition: stable  Diet-   Dietary Orders (From admission, onward)       Start     Ordered    02/27/25 1636  Diet Heart Healthy Diabetic; 2000 Calories (up to 75 gm per meal); Standard Tray  Diet effective now        Question Answer Comment   Diet Modifier: Diabetic    Total calories / carbs: 2000 Calories (up to 75 gm per meal)    Tray type: Standard Tray        02/27/25 1636                   Medications Per DC med rec  Activities as tolerated   Follow-up Information       Stephanie Sotelo MD.  Schedule an appointment as soon as possible for a visit in 2 week(s).    Specialty: Family Medicine  Why: post discharge, new diagnosis DM2- A1C 7.0  Contact information:  [address] [place] [address]  [phone]               Laz Jama MD. Schedule an appointment as soon as possible for a visit in 1 week(s).    Specialties: Cardiovascular Disease, Cardiology  Why: post discharge with cmp, mag  Contact information:  [address]  [facility] [address] [phone]                           For further questions contact hospitalist office    Discharge time 34 minutes    For worsening symptoms, chest pain, shortness of breath, increased abdominal pain, high grade fever, stroke or stroke like symptoms, immediately go to the nearest Emergency Room or call 911 as soon as possible.    Portions of this note dictated using EMR integrated voice recognition software, and may be subject to voice recognition errors not corrected at proofreading. Please contact writer for clarification if needed    Angel Lawton MD  Department of Hospital Medicine   [facility]   02/28/2025 10:31 AM

## 2025-02-28 NOTE — PT/OT/SLP PROGRESS
Physical Therapy Treatment    Patient Name:  Vesta Lala   MRN:  [id number]    Recommendations:     Discharge therapy intensity: Low Intensity Therapy   Discharge Equipment Recommendations: none  Barriers to discharge: Ongoing medical needs    Assessment:     Vesta Lala is a 79 y.o. female admitted with a medical diagnosis of  NSTEMI, CAD, heart failure.  She presents with the following impairments/functional limitations: weakness, impaired balance, impaired endurance, gait instability, decreased lower extremity function.    Rehab Prognosis: Good; patient would benefit from acute skilled PT services to address these deficits and reach maximum level of function.    Recent Surgery: Procedure(s) (LRB):  Left heart cath (Left) 2 Days Post-Op    Plan:     During this hospitalization, patient would benefit from acute PT services 3 x/week to address the identified rehab impairments via gait training, therapeutic activities, therapeutic exercises and progress toward the following goals:    Plan of Care Expires:  03/29/25    Subjective     Chief Complaint: none noted  Patient/Family Comments/goals: to return to PLOF      Objective:     Communicated with RN prior to session.  Patient found  sitting on couch  with peripheral IV upon PT entry to room.     General Precautions: Standard,    Orthopedic Precautions: N/A  Braces: N/A  Respiratory Status: Room air    Functional Mobility:  Transfers:     Sit to Stand:  stand by assistance with 4 wheeled walker  Gait: Pt ambulated 70'/70' with 4ww CGA and progressing to SBA 2/2 fatigue. No major LOB or safety concerns, 02 stable throughout. No complaints of SOB.     Education:  Patient provided with verbal education education regarding PT role/goals/POC, fall prevention, and safety awareness.  Understanding was verbalized.     Patient left sitting on couch with all lines intact, call button in reach, and family present    GOALS:   Multidisciplinary Problems       Physical  Therapy Goals          Problem: Physical Therapy    Goal Priority Disciplines Outcome Interventions   Physical Therapy Goal     PT, PT/OT Progressing    Description: Goals to be met by: d/c     Patient will increase functional independence with mobility by performin. Supine to sit with Modified [place]  2. Sit to supine with Modified [place]  3. Sit to stand transfer with Modified [place]  4. Bed to chair transfer with Modified [place] using Rolling Walker  5. Gait  x 150 feet with Modified [place] using Rolling Walker.                          Time Tracking:     PT Received On: 25  PT Start Time: 901     PT Stop Time: 914  PT Total Time (min): 13 min     Billable Minutes: Therapeutic Activity 13    Treatment Type: Treatment  PT/PTA: PT     Number of PTA visits since last PT visit: 2025

## 2025-02-28 NOTE — CONSULTS
Inpatient Nutrition Evaluation    Admit Date: 2/26/2025   Total duration of encounter: 2 days    Nutrition Recommendation/Prescription     Diet Cardiac diabetic ordered  Assess knowledge retention from Diabetic diet education  Encouraged adequate PO intake  Monitor appetite/PO intake, weight, and labs    Nutrition Assessment     Chart Review    Reason Seen: physician consult for DM diet education    Malnutrition Screening Tool Results   Have you recently lost weight without trying?: No  Have you been eating poorly because of a decreased appetite?: No   MST Score: 0     Diagnosis:  NSTEMI Type II due to Heart Failure    - Troponin Peak 0.356  CAD (Moderate Nonobstructive Disease) (2.26.25)  Acute Diastolic Heart Failure (Compensated)    - EF 55-60% (2.26.25)/Elevated EDP  PAF- Now SR    - CHADsVASc - 5 Points - 7.2% Stroke Risk per Year     - On Xarelto for Stroke Risk Reduction  Hypertension (BP Stable)  Hyperlipidemia    - Not on Statin due to Intolerance (Tolerates Crestor)  CLAY/CPAP  Elevated BMI/Morbid Obesity  Hypothyroidism    - On Oral Replacement Therapy  No History of GI Bleed     Relevant Medical History:    High cholesterol      Hypertension      Internal hemorrhoids 07/2023    Paroxysmal atrial fibrillation      RSV (acute bronchiolitis due to respiratory syncytial virus) 11/2023        Nutrition-Related Medications:   Scheduled Meds:   aspirin  81 mg Oral Daily    famotidine  20 mg Oral BID    fluticasone propionate  2 spray Each Nostril Daily    levothyroxine  88 mcg Oral Before breakfast    losartan  50 mg Oral Daily    magnesium oxide  400 mg Oral BID    metoprolol succinate  50 mg Oral Daily    rivaroxaban  20 mg Oral Daily with dinner     Continuous Infusions:  PRN Meds:.  Current Facility-Administered Medications:     acetaminophen, 1,000 mg, Oral, Q6H PRN    albuterol, 2 puff, Inhalation, Q6H PRN    aluminum-magnesium hydroxide-simethicone, 30 mL, Oral, QID PRN    bisacodyL, 10 mg, Rectal, Daily  PRN    dextrose 50%, 12.5 g, Intravenous, PRN    dextrose 50%, 25 g, Intravenous, PRN    docusate sodium, 100 mg, Oral, BID PRN    glucagon (human recombinant), 1 mg, Intramuscular, PRN    glucose, 16 g, Oral, PRN    glucose, 24 g, Oral, PRN    hydrALAZINE, 10 mg, Intravenous, Q4H PRN    insulin aspart U-100, 0-5 Units, Subcutaneous, QID (AC + HS) PRN    melatonin, 6 mg, Oral, Nightly PRN    ondansetron, 8 mg, Oral, Q8H PRN    polyethylene glycol, 17 g, Oral, BID PRN    sodium chloride 0.9%, 10 mL, Intravenous, PRN      Nutrition-Related Labs:  Recent Labs   Lab 02/25/25  2110 02/26/25  0451 02/26/25  1203 02/27/25  0406 02/27/25  0407 02/28/25  0458   *  --   --  147*  --  145   K 4.1  --   --  3.9  --  3.4*   CALCIUM 9.3  --   --  9.1  --  8.6   MG  --   --   --  1.90  --  1.80   *  --   --  112*  --  108*   CO2 25  --   --  24  --  29   BUN 19.2  --   --  11.4  --  11.2   CREATININE 0.78  --   --  0.65  --  0.61   EGFRNORACEVR >60  --   --  >60  --  >60   GLUCOSE 142*  --   --  107  --  123*   BILITOT 0.3  --   --  0.3  --   --    ALKPHOS 79  --   --  71  --   --    ALT 12  --   --  9  --   --    AST 23  --   --  18  --   --    ALBUMIN 3.4  --   --  3.5  --   --    HGBA1C  --  7.0  --   --   --   --    WBC 8.15  --  6.82  --  6.90  --    HGB 12.1  --  11.6*  --  11.4*  --    HCT 38.8  --  37.1  --  38.3  --          Diet Order: Diet Cardiac  Diet diabetic  Oral Supplement Order: none  Appetite/Oral Intake: good/% of meals  Factors Affecting Nutritional Intake: none identified  Food/Episcopalian/Cultural Preferences: none reported  Food Allergies: none reported       Wound(s):   none noted    Comments    2/28/2025: Pt reports a diet change recently with a good appetite/PO intake prior to admit and currently. Pt denies N/V/D and chewing/swallowing difficulties. Pt reports constipation. Last BM documented as 2/25/2025. Per EMR, pt weighed 116.3 kg on 10/1/2024 (3.3% wt loss in 4 months,  "insignificant). Encouraged adequate PO intake. Provided DM education and printed materials. Will monitor.    Anthropometrics    Height: 5' 4" (162.6 cm) Height Method: Stated  Last Weight: 112.4 kg (247 lb 12.8 oz) (25 0500) Weight Method: Standard Scale  BMI (Calculated): 42.5  BMI Classification: obese grade III (BMI >/=40)     Ideal Body Weight (IBW), Female: 120 lb     % Ideal Body Weight, Female (lb): 216.67 %                    Usual Body Weight (UBW), k.3 kg  % Usual Body Weight: 96.85     Usual Weight Provided By: EMR weight history    Wt Readings from Last 5 Encounters:   25 112.4 kg (247 lb 12.8 oz)   25 117.9 kg (260 lb)   10/01/24 116.3 kg (256 lb 6.4 oz)   24 117.1 kg (258 lb 3.2 oz)   23 118 kg (260 lb 3.2 oz)     Weight Change(s) Since Admission:  Admit Weight: 117.9 kg (260 lb) (25 0350)  2025: 112.4 kg    Patient Education    Education Provided: diabetic diet  Teaching Method: explanation and printed materials  Comprehension: verbalizes understanding and needs reinforcement  Barriers to Learning: none evident  Expected Compliance: fair  Comments: All questions were answered and dietitian's contact information was provided.     Monitoring & Evaluation     Dietitian will monitor food and beverage intake, weight, electrolyte/renal panel, food/nutrition knowledge skill, glucose/endocrine profile, and gastrointestinal profile.  Nutrition Risk/Follow-Up: low (follow-up in 5-7 days)  Patients assigned 'low nutrition risk' status do not qualify for a full nutritional assessment but will be monitored and re-evaluated in a 5-7 day time period. Please consult if re-evaluation needed sooner.    "

## 2025-03-03 ENCOUNTER — PATIENT MESSAGE (OUTPATIENT)
Dept: ADMINISTRATIVE | Facility: CLINIC | Age: 80
End: 2025-03-03
Payer: MEDICARE

## 2025-03-03 ENCOUNTER — PATIENT OUTREACH (OUTPATIENT)
Dept: ADMINISTRATIVE | Facility: CLINIC | Age: 80
End: 2025-03-03
Payer: MEDICARE

## 2025-03-03 NOTE — PROGRESS NOTES
C3 nurse attempted to contact Vesta Lala  for a TCC post hospital discharge follow up call. No answer. Left voicemail with callback information. The patient has a scheduled HOSFU appointment with Stephanie Sotelo MD  on 03/13/2025 @ 145 pm.   Appointment with Dr. Jama on 03/05/2025 @ 1 15 pm.     Message sent via patient's portal regarding follow up call.

## 2025-03-05 ENCOUNTER — LAB VISIT (OUTPATIENT)
Dept: LAB | Facility: HOSPITAL | Age: 80
End: 2025-03-05
Attending: INTERNAL MEDICINE
Payer: MEDICARE

## 2025-03-05 DIAGNOSIS — I10 ESSENTIAL HYPERTENSION, MALIGNANT: Primary | ICD-10-CM

## 2025-03-05 LAB
ANION GAP SERPL CALC-SCNC: 9 MEQ/L
BUN SERPL-MCNC: 16.5 MG/DL (ref 9.8–20.1)
CALCIUM SERPL-MCNC: 9.4 MG/DL (ref 8.4–10.2)
CHLORIDE SERPL-SCNC: 109 MMOL/L (ref 98–107)
CO2 SERPL-SCNC: 29 MMOL/L (ref 23–31)
CREAT SERPL-MCNC: 0.77 MG/DL (ref 0.55–1.02)
CREAT/UREA NIT SERPL: 21
GFR SERPLBLD CREATININE-BSD FMLA CKD-EPI: >60 ML/MIN/1.73/M2
GLUCOSE SERPL-MCNC: 126 MG/DL (ref 82–115)
MAGNESIUM SERPL-MCNC: 2 MG/DL (ref 1.6–2.6)
POTASSIUM SERPL-SCNC: 4.9 MMOL/L (ref 3.5–5.1)
SODIUM SERPL-SCNC: 147 MMOL/L (ref 136–145)

## 2025-03-05 PROCEDURE — 83735 ASSAY OF MAGNESIUM: CPT

## 2025-03-05 PROCEDURE — 80048 BASIC METABOLIC PNL TOTAL CA: CPT

## 2025-03-05 PROCEDURE — 36415 COLL VENOUS BLD VENIPUNCTURE: CPT

## 2025-03-05 NOTE — PROGRESS NOTES
C3 nurse spoke with Vesta Lala  for a TCC post hospital discharge follow up call. The patient has a scheduled [facility] appointment with Stephanie Sotelo MD  on 03/13/2025 @ 145 pm.   Appointment Dr. Jama today, 03/05/2025.

## 2025-03-05 NOTE — PROGRESS NOTES
C3 nurse attempted to contact Vesta Lala  for a TCC post hospital discharge follow up call. No answer. Left voicemail with callback information. The patient has a scheduled HOSFU appointment with Stephanie Sotelo MD  on 03/13/2025 @ 145 pm.   Appointment with Dr. Jama on 03/05/2025 @ 1 15 pm.

## 2025-04-09 ENCOUNTER — LAB REQUISITION (OUTPATIENT)
Dept: LAB | Facility: HOSPITAL | Age: 80
End: 2025-04-09
Payer: MEDICARE

## 2025-04-09 DIAGNOSIS — I11.0 HYPERTENSIVE HEART DISEASE WITH HEART FAILURE: ICD-10-CM

## 2025-04-09 DIAGNOSIS — E78.00 PURE HYPERCHOLESTEROLEMIA, UNSPECIFIED: ICD-10-CM

## 2025-04-09 DIAGNOSIS — E55.9 VITAMIN D DEFICIENCY, UNSPECIFIED: ICD-10-CM

## 2025-04-09 DIAGNOSIS — E11.9 TYPE 2 DIABETES MELLITUS WITHOUT COMPLICATIONS: ICD-10-CM

## 2025-04-09 LAB
ALBUMIN SERPL-MCNC: 3.2 G/DL (ref 3.4–4.8)
ALBUMIN/GLOB SERPL: 1.2 RATIO (ref 1.1–2)
ALP SERPL-CCNC: 78 UNIT/L (ref 40–150)
ALT SERPL-CCNC: 11 UNIT/L (ref 0–55)
ANION GAP SERPL CALC-SCNC: 7 MEQ/L
AST SERPL-CCNC: 17 UNIT/L (ref 11–45)
BASOPHILS # BLD AUTO: 0.03 X10(3)/MCL
BASOPHILS NFR BLD AUTO: 0.6 %
BILIRUB SERPL-MCNC: 0.3 MG/DL
BILIRUB UR QL STRIP.AUTO: NEGATIVE
BUN SERPL-MCNC: 24.5 MG/DL (ref 9.8–20.1)
CALCIUM SERPL-MCNC: 9.2 MG/DL (ref 8.4–10.2)
CHLORIDE SERPL-SCNC: 105 MMOL/L (ref 98–107)
CHOLEST SERPL-MCNC: 141 MG/DL
CHOLEST/HDLC SERPL: 3 {RATIO} (ref 0–5)
CLARITY UR: CLEAR
CO2 SERPL-SCNC: 30 MMOL/L (ref 23–31)
COLOR UR AUTO: ABNORMAL
CREAT SERPL-MCNC: 0.74 MG/DL (ref 0.55–1.02)
CREAT/UREA NIT SERPL: 33
EOSINOPHIL # BLD AUTO: 0.14 X10(3)/MCL (ref 0–0.9)
EOSINOPHIL NFR BLD AUTO: 2.8 %
ERYTHROCYTE [DISTWIDTH] IN BLOOD BY AUTOMATED COUNT: 13.7 % (ref 11.5–17)
EST. AVERAGE GLUCOSE BLD GHB EST-MCNC: 131.2 MG/DL
GFR SERPLBLD CREATININE-BSD FMLA CKD-EPI: >60 ML/MIN/1.73/M2
GLOBULIN SER-MCNC: 2.6 GM/DL (ref 2.4–3.5)
GLUCOSE SERPL-MCNC: 106 MG/DL (ref 82–115)
GLUCOSE UR QL STRIP: 500
HBA1C MFR BLD: 6.2 %
HCT VFR BLD AUTO: 38 % (ref 37–47)
HDLC SERPL-MCNC: 48 MG/DL (ref 35–60)
HGB BLD-MCNC: 11.9 G/DL (ref 12–16)
HGB UR QL STRIP: NEGATIVE
IMM GRANULOCYTES # BLD AUTO: 0.01 X10(3)/MCL (ref 0–0.04)
IMM GRANULOCYTES NFR BLD AUTO: 0.2 %
KETONES UR QL STRIP: NEGATIVE
LDLC SERPL CALC-MCNC: 66 MG/DL (ref 50–140)
LEUKOCYTE ESTERASE UR QL STRIP: NEGATIVE
LYMPHOCYTES # BLD AUTO: 1.87 X10(3)/MCL (ref 0.6–4.6)
LYMPHOCYTES NFR BLD AUTO: 37.4 %
MCH RBC QN AUTO: 28.8 PG (ref 27–31)
MCHC RBC AUTO-ENTMCNC: 31.3 G/DL (ref 33–36)
MCV RBC AUTO: 92 FL (ref 80–94)
MONOCYTES # BLD AUTO: 0.28 X10(3)/MCL (ref 0.1–1.3)
MONOCYTES NFR BLD AUTO: 5.6 %
NEUTROPHILS # BLD AUTO: 2.67 X10(3)/MCL (ref 2.1–9.2)
NEUTROPHILS NFR BLD AUTO: 53.4 %
NITRITE UR QL STRIP: NEGATIVE
PH UR STRIP: 6 [PH]
PLATELET # BLD AUTO: 177 X10(3)/MCL (ref 130–400)
PMV BLD AUTO: 11.1 FL (ref 7.4–10.4)
POTASSIUM SERPL-SCNC: 4.2 MMOL/L (ref 3.5–5.1)
PROT SERPL-MCNC: 5.8 GM/DL (ref 5.8–7.6)
PROT UR QL STRIP: NEGATIVE
RBC # BLD AUTO: 4.13 X10(6)/MCL (ref 4.2–5.4)
SODIUM SERPL-SCNC: 142 MMOL/L (ref 136–145)
SP GR UR STRIP.AUTO: 1.01 (ref 1–1.03)
T4 FREE SERPL-MCNC: 0.96 NG/DL (ref 0.7–1.48)
TRIGL SERPL-MCNC: 137 MG/DL (ref 37–140)
UROBILINOGEN UR STRIP-ACNC: 1
VLDLC SERPL CALC-MCNC: 27 MG/DL
WBC # BLD AUTO: 5 X10(3)/MCL (ref 4.5–11.5)

## 2025-04-09 PROCEDURE — 80053 COMPREHEN METABOLIC PANEL: CPT | Performed by: FAMILY MEDICINE

## 2025-04-09 PROCEDURE — 85025 COMPLETE CBC W/AUTO DIFF WBC: CPT | Performed by: FAMILY MEDICINE

## 2025-04-09 PROCEDURE — 83036 HEMOGLOBIN GLYCOSYLATED A1C: CPT | Performed by: FAMILY MEDICINE

## 2025-04-09 PROCEDURE — 81003 URINALYSIS AUTO W/O SCOPE: CPT | Performed by: FAMILY MEDICINE

## 2025-04-09 PROCEDURE — 84439 ASSAY OF FREE THYROXINE: CPT | Performed by: FAMILY MEDICINE

## 2025-04-09 PROCEDURE — 80061 LIPID PANEL: CPT | Performed by: FAMILY MEDICINE

## 2025-04-09 PROCEDURE — 82306 VITAMIN D 25 HYDROXY: CPT | Performed by: FAMILY MEDICINE

## 2025-04-09 PROCEDURE — 84481 FREE ASSAY (FT-3): CPT | Performed by: FAMILY MEDICINE

## 2025-04-10 LAB
25(OH)D3+25(OH)D2 SERPL-MCNC: 38 NG/ML (ref 30–80)
T3FREE SERPL-MCNC: 2.47 PG/ML (ref 1.58–3.91)

## (undated) DEVICE — KIT HAND CONTROL HIGH PRESSUR

## (undated) DEVICE — GUIDE LAUNCHER 6FR EBU 3.5

## (undated) DEVICE — KIT GLIDESHEATH SLEND 6FR 10CM

## (undated) DEVICE — SUT PERCLOSE PROSTYLE MEDIATE

## (undated) DEVICE — CATH FL 3.5 5FR

## (undated) DEVICE — GUIDEWIRE STD .035X260CM ANG

## (undated) DEVICE — CANNULA NASAL ADULT

## (undated) DEVICE — SHEATH INTRODUCER 6FR 11CM

## (undated) DEVICE — KIT MINI STK MAX COAX 5FR 10CM

## (undated) DEVICE — BAND TR COMP DEVICE REG 24CM

## (undated) DEVICE — DEVICE BASIXCOMPAK INFL 20ML

## (undated) DEVICE — GUIDEWIRE OMNI STR TIP 185CM

## (undated) DEVICE — KIT NAMIC HEART STD LEFT

## (undated) DEVICE — WIRE GUIDE INQWIRE STR 180CM

## (undated) DEVICE — CATH IMPULSE 5F 100CM FR4

## (undated) DEVICE — PAD DEFIB CADENCE ADULT R2